# Patient Record
Sex: FEMALE | Race: WHITE | NOT HISPANIC OR LATINO | Employment: FULL TIME | URBAN - METROPOLITAN AREA
[De-identification: names, ages, dates, MRNs, and addresses within clinical notes are randomized per-mention and may not be internally consistent; named-entity substitution may affect disease eponyms.]

---

## 2017-01-21 ENCOUNTER — HOSPITAL ENCOUNTER (INPATIENT)
Facility: HOSPITAL | Age: 37
LOS: 2 days | Discharge: HOME | End: 2017-01-23
Attending: EMERGENCY MEDICINE | Admitting: FAMILY MEDICINE
Payer: MEDICAID

## 2017-01-21 DIAGNOSIS — M79.89 SWELLING OF RIGHT LOWER EXTREMITY: ICD-10-CM

## 2017-01-21 DIAGNOSIS — L03.115 CELLULITIS OF RIGHT LOWER EXTREMITY: Primary | ICD-10-CM

## 2017-01-21 LAB
ALBUMIN SERPL BCP-MCNC: 3.2 G/DL
ALP SERPL-CCNC: 57 U/L
ALT SERPL W/O P-5'-P-CCNC: 14 U/L
ANION GAP SERPL CALC-SCNC: 8 MMOL/L
AST SERPL-CCNC: 15 U/L
BASOPHILS # BLD AUTO: 0 K/UL
BASOPHILS NFR BLD: 0.6 %
BILIRUB SERPL-MCNC: 0.7 MG/DL
BUN SERPL-MCNC: 6 MG/DL
CALCIUM SERPL-MCNC: 8.2 MG/DL
CHLORIDE SERPL-SCNC: 108 MMOL/L
CO2 SERPL-SCNC: 21 MMOL/L
CREAT SERPL-MCNC: 0.7 MG/DL
CRP SERPL-MCNC: 98.1 MG/L
DIFFERENTIAL METHOD: ABNORMAL
EOSINOPHIL # BLD AUTO: 0.1 K/UL
EOSINOPHIL NFR BLD: 0.9 %
ERYTHROCYTE [DISTWIDTH] IN BLOOD BY AUTOMATED COUNT: 12 %
ERYTHROCYTE [SEDIMENTATION RATE] IN BLOOD BY WESTERGREN METHOD: 54 MM/HR
EST. GFR  (AFRICAN AMERICAN): >60 ML/MIN/1.73 M^2
EST. GFR  (NON AFRICAN AMERICAN): >60 ML/MIN/1.73 M^2
GLUCOSE SERPL-MCNC: 91 MG/DL
HCT VFR BLD AUTO: 31.4 %
HGB BLD-MCNC: 10.8 G/DL
INR PPP: 1
LACTATE SERPL-SCNC: 0.7 MMOL/L
LYMPHOCYTES # BLD AUTO: 1.4 K/UL
LYMPHOCYTES NFR BLD: 21 %
MCH RBC QN AUTO: 31.2 PG
MCHC RBC AUTO-ENTMCNC: 34.6 %
MCV RBC AUTO: 90 FL
MONOCYTES # BLD AUTO: 0.7 K/UL
MONOCYTES NFR BLD: 10.5 %
NEUTROPHILS # BLD AUTO: 4.4 K/UL
NEUTROPHILS NFR BLD: 67 %
PLATELET # BLD AUTO: 189 K/UL
PMV BLD AUTO: 8.2 FL
POTASSIUM SERPL-SCNC: 3.5 MMOL/L
PROT SERPL-MCNC: 6.5 G/DL
PROTHROMBIN TIME: 10.8 SEC
RBC # BLD AUTO: 3.48 M/UL
SODIUM SERPL-SCNC: 137 MMOL/L
WBC # BLD AUTO: 6.5 K/UL

## 2017-01-21 PROCEDURE — 85610 PROTHROMBIN TIME: CPT

## 2017-01-21 PROCEDURE — 83605 ASSAY OF LACTIC ACID: CPT

## 2017-01-21 PROCEDURE — 63600175 PHARM REV CODE 636 W HCPCS: Performed by: FAMILY MEDICINE

## 2017-01-21 PROCEDURE — 25000003 PHARM REV CODE 250: Performed by: EMERGENCY MEDICINE

## 2017-01-21 PROCEDURE — 87040 BLOOD CULTURE FOR BACTERIA: CPT | Mod: 59

## 2017-01-21 PROCEDURE — 12000002 HC ACUTE/MED SURGE SEMI-PRIVATE ROOM

## 2017-01-21 PROCEDURE — 25000003 PHARM REV CODE 250: Performed by: FAMILY MEDICINE

## 2017-01-21 PROCEDURE — 25000003 PHARM REV CODE 250: Performed by: PHYSICIAN ASSISTANT

## 2017-01-21 PROCEDURE — 85025 COMPLETE CBC W/AUTO DIFF WBC: CPT

## 2017-01-21 PROCEDURE — 63600175 PHARM REV CODE 636 W HCPCS: Performed by: PHYSICIAN ASSISTANT

## 2017-01-21 PROCEDURE — 85651 RBC SED RATE NONAUTOMATED: CPT

## 2017-01-21 PROCEDURE — 86140 C-REACTIVE PROTEIN: CPT

## 2017-01-21 PROCEDURE — 80053 COMPREHEN METABOLIC PANEL: CPT

## 2017-01-21 PROCEDURE — 36415 COLL VENOUS BLD VENIPUNCTURE: CPT

## 2017-01-21 PROCEDURE — 99284 EMERGENCY DEPT VISIT MOD MDM: CPT

## 2017-01-21 RX ORDER — METOCLOPRAMIDE HYDROCHLORIDE 5 MG/ML
5 INJECTION INTRAMUSCULAR; INTRAVENOUS EVERY 6 HOURS PRN
Status: DISCONTINUED | OUTPATIENT
Start: 2017-01-21 | End: 2017-01-23 | Stop reason: HOSPADM

## 2017-01-21 RX ORDER — RAMELTEON 8 MG/1
8 TABLET ORAL NIGHTLY PRN
Status: DISCONTINUED | OUTPATIENT
Start: 2017-01-21 | End: 2017-01-23 | Stop reason: HOSPADM

## 2017-01-21 RX ORDER — ACETAMINOPHEN 325 MG/1
650 TABLET ORAL EVERY 6 HOURS PRN
Status: DISCONTINUED | OUTPATIENT
Start: 2017-01-21 | End: 2017-01-23 | Stop reason: HOSPADM

## 2017-01-21 RX ORDER — PHENTERMINE HYDROCHLORIDE 37.5 MG/1
37.5 TABLET ORAL
COMMUNITY
End: 2019-09-30

## 2017-01-21 RX ORDER — KETOROLAC TROMETHAMINE 30 MG/ML
30 INJECTION, SOLUTION INTRAMUSCULAR; INTRAVENOUS EVERY 6 HOURS
Status: DISCONTINUED | OUTPATIENT
Start: 2017-01-21 | End: 2017-01-22

## 2017-01-21 RX ORDER — ONDANSETRON 4 MG/1
8 TABLET, ORALLY DISINTEGRATING ORAL EVERY 8 HOURS PRN
Status: DISCONTINUED | OUTPATIENT
Start: 2017-01-21 | End: 2017-01-21

## 2017-01-21 RX ORDER — ONDANSETRON 4 MG/1
4 TABLET, ORALLY DISINTEGRATING ORAL EVERY 6 HOURS PRN
Status: DISCONTINUED | OUTPATIENT
Start: 2017-01-21 | End: 2017-01-23 | Stop reason: HOSPADM

## 2017-01-21 RX ORDER — HYDROCODONE BITARTRATE AND ACETAMINOPHEN 5; 325 MG/1; MG/1
1 TABLET ORAL EVERY 6 HOURS PRN
Status: DISCONTINUED | OUTPATIENT
Start: 2017-01-21 | End: 2017-01-23 | Stop reason: HOSPADM

## 2017-01-21 RX ORDER — TRAMADOL HYDROCHLORIDE 50 MG/1
50 TABLET ORAL EVERY 6 HOURS PRN
Status: DISCONTINUED | OUTPATIENT
Start: 2017-01-21 | End: 2017-01-23 | Stop reason: HOSPADM

## 2017-01-21 RX ADMIN — VANCOMYCIN HYDROCHLORIDE 1250 MG: 1 INJECTION, POWDER, LYOPHILIZED, FOR SOLUTION INTRAVENOUS at 06:01

## 2017-01-21 RX ADMIN — RAMELTEON 8 MG: 8 TABLET, FILM COATED ORAL at 08:01

## 2017-01-21 RX ADMIN — Medication 4.5 G: at 08:01

## 2017-01-21 RX ADMIN — KETOROLAC TROMETHAMINE 30 MG: 30 INJECTION, SOLUTION INTRAMUSCULAR at 06:01

## 2017-01-21 RX ADMIN — ONDANSETRON 8 MG: 4 TABLET, ORALLY DISINTEGRATING ORAL at 07:01

## 2017-01-21 RX ADMIN — METOCLOPRAMIDE 5 MG: 5 INJECTION, SOLUTION INTRAMUSCULAR; INTRAVENOUS at 11:01

## 2017-01-21 RX ADMIN — KETOROLAC TROMETHAMINE 30 MG: 30 INJECTION, SOLUTION INTRAMUSCULAR at 11:01

## 2017-01-21 RX ADMIN — TRAMADOL HYDROCHLORIDE 50 MG: 50 TABLET, COATED ORAL at 08:01

## 2017-01-21 NOTE — IP AVS SNAPSHOT
[address] [place] [address]  Phone: [phone]           Patient Discharge Instructions     Our goal is to set you up for success. This packet includes information on your condition, medications, and your home care. It will help you to care for yourself so you don't get sicker and need to go back to the hospital.     Please ask your nurse if you have any questions.        There are many details to remember when preparing to leave the hospital. Here is what you will need to do:    1. Take your medicine. If you are prescribed medications, review your Medication List in the following pages. You may have new medications to  at the pharmacy and others that you'll need to stop taking. Review the instructions for how and when to take your medications. Talk with your doctor or nurses if you are unsure of what to do.     2. Go to your follow-up appointments. Specific follow-up information is listed in the following pages. Your may be contacted by a transition nurse or clinical provider about future appointments. Be sure we have all of the phone numbers to reach you, if needed. Please contact your provider's office if you are unable to make an appointment.     3. Watch for warning signs. Your doctor or nurse will give you detailed warning signs to watch for and when to call for assistance. These instructions may also include educational information about your condition. If you experience any of warning signs to your health, call your doctor.               [facility]  Unless otherwise directed by your provider, please contact [facility], our nurse care line that is available for 24/7 assistance.     [phone] (toll-free)    Registered nurses in the [facility] provide clinical advisement, health education, appointment booking, and other advisory services.                    ** Verify the list of medication(s) below is accurate and up to date.  Carry this with you in case of emergency. If your medications have changed, please notify your healthcare provider.             Medication List      START taking these medications        Additional Info                      butalbital-acetaminophen-caffeine -40 mg -40 mg per tablet   Commonly known as:  FIORICET, ESGIC   Quantity:  30 tablet   Refills:  0   Dose:  1 tablet    Instructions:  Take 1 tablet by mouth every 4 (four) hours as needed for Pain.     Begin Date    AM    Noon    PM    Bedtime       cephALEXin 250 MG capsule   Commonly known as:  KEFLEX   Quantity:  40 capsule   Refills:  0   Dose:  250 mg   Indications:  Skin & soft tissue    Last time this was given:  250 mg on 1/23/2017  6:31 AM   Instructions:  Take 1 capsule (250 mg total) by mouth every 6 (six) hours.     Begin Date    AM    Noon    PM    Bedtime         CONTINUE taking these medications        Additional Info                      ibuprofen 800 MG tablet   Commonly known as:  ADVIL,MOTRIN   Quantity:  20 tablet   Refills:  0   Dose:  800 mg    Instructions:  Take 1 tablet (800 mg total) by mouth 3 (three) times daily as needed.     Begin Date    AM    Noon    PM    Bedtime       phentermine 37.5 mg tablet   Commonly known as:  ADIPEX-P   Refills:  0   Dose:  37.5 mg    Instructions:  Take 37.5 mg by mouth before breakfast.     Begin Date    AM    Noon    PM    Bedtime         ASK your doctor about these medications        Additional Info                      ondansetron 4 MG tablet   Commonly known as:  ZOFRAN   Quantity:  12 tablet   Refills:  0   Dose:  4 mg    Instructions:  Take 1 tablet (4 mg total) by mouth every 8 (eight) hours as needed.     Begin Date    AM    Noon    PM    Bedtime       ondansetron 4 MG Tbdl   Commonly known as:  ZOFRAN-ODT   Quantity:  12 tablet   Refills:  0   Dose:  4 mg    Last time this was given:  4 mg on 1/23/2017  4:07 AM   Instructions:  Take 1 tablet (4 mg total) by mouth every 8 (eight)  hours as needed.     Begin Date    AM    Noon    PM    Bedtime       tramadol 50 mg tablet   Commonly known as:  ULTRAM   Quantity:  12 tablet   Refills:  0   Dose:  50 mg    Last time this was given:  50 mg on 1/23/2017  8:46 AM   Instructions:  Take 1 tablet (50 mg total) by mouth every 6 (six) hours as needed.     Begin Date    AM    Noon    PM    Bedtime            Where to Get Your Medications      These medications were sent to [id number] - [place], [place] - [address] [address] [facility] [address]  [address] [place] [address]     Phone:  [phone]     butalbital-acetaminophen-caffeine -40 mg -40 mg per tablet    cephALEXin 250 MG capsule                  Please bring to all follow up appointments:    1. A copy of your discharge instructions.  2. All medicines you are currently taking in their original bottles.  3. Identification and insurance card.    Please arrive 15 minutes ahead of scheduled appointment time.    Please call 24 hours in advance if you must reschedule your appointment and/or time.        Follow-up Information     Follow up with Charity Allen MD In 1 week.    Specialty:  Family Medicine    Why:  hospital follow up scheduled for 1-30-17 @ 9:45 a.m.    Contact information:    [address] [place] [address]  [phone]          Discharge Instructions     Future Orders    Activity as tolerated     Call MD for:  difficulty breathing or increased cough     Call MD for:  increased confusion or weakness     Call MD for:  worsening rash     Diet general     Questions:    Total calories:      Fat restriction, if any:      Protein restriction, if any:      Na restriction, if any:      Fluid restriction:      Additional restrictions:          Discharge Instructions       Thank you for choosing [facility] for your medical care. The primary doctor who is taking care of you at the time of your discharge is Melania Dejesus MD.  "    You were admitted to the hospital with Cellulitis of right lower extremity.     Please note your discharge instructions, including diet/activity restrictions, follow-up appointments, and medication changes.  If you have any questions about your medical issues, prescriptions, or any other questions, please feel free to contact the [facility] Medicine Dept at [phone] and we will help.    If you are previously with Home health, outpatient PT/OT or under a therapy program, you are cleared to return to those programs.    Please direct all long term medication refills and follow up to your primary care provider, Primary Doctor No. Thank you again for letting us take care of your health care needs.    Please note the following discharge instructions per your discharging physician-  Take all medications as prescribed. Follow up with your doctor in 1 week.         Primary Diagnosis     Your primary diagnosis was:  Bacterial Skin Infection Of Leg      Admission Information     Date & Time Provider Department CSN    1/21/2017  3:02 PM Melania Dejesus MD [facility] [id number]      Care Providers     Provider Role Specialty Primary office phone    Melania Dejesus MD Attending Provider Family Medicine [phone]      Your Vitals Were     BP Pulse Temp Resp Height Weight    136/71 (BP Location: Right arm, Patient Position: Sitting, BP Method: Automatic) 80 98.2 °F (36.8 °C) (Oral) 18 5' 4" (1.626 m) 145.2 kg (320 lb)    Last Period SpO2 BMI          01/15/2017 96% 54.93 kg/m2        Recent Lab Values     No lab values to display.      Pending Labs     Order Current Status    Blood culture #1 Preliminary result    Blood culture #2 Preliminary result      Allergies as of 1/23/2017     No Known Allergies      Advance Directives     An advance directive is a document which, in the event you are no longer able to make decisions for yourself, tells your healthcare team what kind of " treatment you do or do not want to receive, or who you would like to make those decisions for you.  If you do not currently have an advance directive, [facility] encourages you to create one.  For more information call:  [phone]), [phone] ([phone]),  or log on to [email or web address].        Smoking Cessation     If you would like to quit smoking:   You may be eligible for free services if you are a [place] resident and started smoking cigarettes before September 1, 1988.  Call the Smoking Cessation [employer] (SCT) toll free at (112) 358-5014 or (474) 024-4289.   Call 3-260-QUIT-NOW if you do not meet the above criteria.            Language Assistance Services     ATTENTION: Language assistance services are available, free of charge. Please call [phone].      ATENCIÓN: Si habla español, tiene a miller disposición servicios gratuitos de asistencia lingüística. Llame al [phone].     CHÚ Ý: N?u b?n nói Ti?ng Vi?t, có các d?ch v? h? tr? ngôn ng? mi?n phí dành cho b?n. G?i s? [phone].        [facility] Sign-Up     Activating your [facility] account is as easy as 1-2-3!     1) Visit [email or web address], select Sign Up Now, enter this activation code and your date of birth, then select Next.  [email or web address]  Expires: 3/9/2017  9:43 AM      2) Create a username and password to use when you visit [facility] in the future and select a security question in case you lose your password and select Next.    3) Enter your e-mail address and click Sign Up!    Additional Information  If you have questions, please e-mail [email or web address] or call [phone] to talk to our [facility] staff. Remember, [facility] is NOT to be used for urgent needs. For medical emergencies, dial 911.          [facility] complies with applicable Federal civil rights laws and does not discriminate on the basis of race, color, national origin, age, disability, or sex.

## 2017-01-21 NOTE — ED PROVIDER NOTES
"Encounter Date: 1/21/2017    SCRIBE #1 NOTE: I, Laure Ayers , am scribing for, and in the presence of,  Dr. Fraga . I have scribed the entire note.       History     Chief Complaint   Patient presents with    Leg Pain     Recently hospitalized at [facility] and D/C this morning with dx of cellulitis     Fever     Review of patient's allergies indicates:  No Known Allergies  HPI Comments:     01/21/2017  3:05 PM     Chief Complaint: Cellulitis       The patient is a 36 y.o. female with a PMHx of morbid obesity, renal disorder who is presenting with the gradual onset of cellulitis x 3 days noted to the RLE. The pt states that she currently feels "like crap" and that the skin around the area of infection feels "extremely tight" and tender. No exacerbating or mitigating factors. The pt confirms that she was DC from  this morning after x 2 days of IP treatment. Per pt, she was negative for DVT w/u but "still had a fever when they DC her this morning". The pt is unsure of what IV abx she was given while there. No pertinent pat surgical. Pt confirms social hx of smoking. No ETOH use.                 The history is provided by the patient and medical records.     Past Medical History   Diagnosis Date    Migraine headache     Morbid obesity     Renal disorder      kidney stone     No past medical history pertinent negatives.  Past Surgical History   Procedure Laterality Date    Cholecystectomy      Tubal ligation      Fracture surgery  1984     right lower leg reconstruction surgery s/p trauma injury     Family History   Problem Relation Age of Onset    Heart disease Mother      Social History   Substance Use Topics    Smoking status: Current Every Day Smoker    Smokeless tobacco: None    Alcohol use No     Review of Systems   Constitutional: Positive for fever.        "like crap"   HENT: Negative for sore throat.    Eyes: Negative for visual disturbance.   Respiratory: Negative for shortness " of breath.    Cardiovascular: Negative for chest pain.   Gastrointestinal: Negative for nausea.   Genitourinary: Negative for dysuria.   Musculoskeletal: Negative for back pain.   Skin: Positive for color change (erythema) and wound. Negative for rash.   Neurological: Negative for weakness.   Hematological: Does not bruise/bleed easily.   Psychiatric/Behavioral: Negative for confusion.       Physical Exam   Initial Vitals   BP Pulse Resp Temp SpO2   01/21/17 1457 01/21/17 1457 01/21/17 1457 01/21/17 1457 01/21/17 1457   124/62 85 18 98.3 °F (36.8 °C) 96 %     Physical Exam    Nursing note and vitals reviewed.  Constitutional: She appears well-developed and well-nourished.   Malaise    HENT:   Head: Normocephalic and atraumatic.   Mouth/Throat: Oropharynx is clear and moist.   Eyes: EOM are normal. Pupils are equal, round, and reactive to light.   Neck: Normal range of motion. Neck supple.   Cardiovascular: Normal rate, regular rhythm, normal heart sounds and intact distal pulses. Exam reveals no gallop and no friction rub.    No murmur heard.  No palpable cord. Negative Honan's sign. Brisk capillary refill. 1+ DP palpable pulse.            Pulmonary/Chest: Breath sounds normal. She has no wheezes. She has no rhonchi. She has no rales.   Abdominal: Soft. Bowel sounds are normal. She exhibits no distension. There is no tenderness.   Musculoskeletal: Normal range of motion. She exhibits no edema or tenderness.   Lymphadenopathy:     She has no cervical adenopathy.   Neurological: She is alert and oriented to person, place, and time. She has normal strength. No sensory deficit.   Skin: Skin is warm. There is erythema.   There is mild warmth noted to the anterior aspect of the R leg with asscoiated erythema and swelling. No fluctuance or induration noted.    Psychiatric: She has a normal mood and affect.         ED Course   Procedures  Labs Reviewed   CBC W/ AUTO DIFFERENTIAL - Abnormal; Notable for the following:         Result Value    RBC 3.48 (*)     Hemoglobin 10.8 (*)     Hematocrit 31.4 (*)     MCH 31.2 (*)     MPV 8.2 (*)     All other components within normal limits   COMPREHENSIVE METABOLIC PANEL - Abnormal; Notable for the following:     CO2 21 (*)     Calcium 8.2 (*)     Albumin 3.2 (*)     All other components within normal limits   SEDIMENTATION RATE, MANUAL - Abnormal; Notable for the following:     Sed Rate 54 (*)     All other components within normal limits   C-REACTIVE PROTEIN - Abnormal; Notable for the following:     CRP 98.1 (*)     All other components within normal limits   LACTIC ACID, PLASMA   PROTIME-INR          X-Rays:   Independently Interpreted Readings:   Other Readings:  Right tib-fib x-ray: I interpreted this x-ray myself.  There is no evidence of subcutaneous gas or foreign body.  There is also no evidence of osteomyelitis.    Medical Decision Making:   History:   Old Records Summarized: records from another hospital.       <> Summary of Records: Reviewed DC paperwork and the pt received Clindamycin for OP treatment.   Initial Assessment:         Emergent evaluation.     Ddx includes bacteremia, sepsis, cellulitis, abscess     PE is not consistent with abscess. Since the pt has failed PO clindamycin I will provide IV Vancomycin and Zosyn. I will discuss admission with hospitalist .               Scribe Attestation:   Scribe #1: I performed the above scribed service and the documentation accurately describes the services I performed. I attest to the accuracy of the note.    Attending Attestation:           Physician Attestation for Scribe:  Physician Attestation Statement for Scribe #1: I, Dr. Fraga, reviewed documentation, as scribed by Laure Ayers  in my presence, and it is both accurate and complete.         Attending ED Notes:   5:06 PM  The patient's laboratory studies are significant for elevated inflammatory markers.  Otherwise, there are no clinically significant abnormalities.  I will  discuss admission with hospital medicine.    5:09 PM  I discussed this case with the hospitalist, Dr. Dejesus.  She has agreed to admit this patient.          ED Course     Clinical Impression:   The primary encounter diagnosis was Cellulitis of right lower extremity. A diagnosis of Swelling of right lower extremity was also pertinent to this visit.    Disposition:   Disposition: Admitted       Raul Fraga MD  02/08/17 9307

## 2017-01-21 NOTE — PROGRESS NOTES
5:53 PM  Patient arrived to ED with transporter. Patient c/o severe pain, requiring pain medication. Admit order shows Dr Dejesus as attending. Called Dr Dejesus cell phone at this time- no answer- voice msg left. Awaiting call back at present.     Received order for IV toradol 30 mg Q6 scheduled for 5 days- only able to order for 3 days maximum per system default.

## 2017-01-22 PROBLEM — R11.2 NON-INTRACTABLE VOMITING WITH NAUSEA: Status: RESOLVED | Noted: 2017-01-22 | Resolved: 2017-01-22

## 2017-01-22 PROBLEM — R11.2 NON-INTRACTABLE VOMITING WITH NAUSEA: Status: ACTIVE | Noted: 2017-01-22

## 2017-01-22 PROBLEM — E66.01 OBESITY, CLASS III, BMI 40-49.9 (MORBID OBESITY): Status: ACTIVE | Noted: 2017-01-22

## 2017-01-22 LAB
ALBUMIN SERPL BCP-MCNC: 2.9 G/DL
ALP SERPL-CCNC: 52 U/L
ALT SERPL W/O P-5'-P-CCNC: 15 U/L
ANION GAP SERPL CALC-SCNC: 9 MMOL/L
AST SERPL-CCNC: 15 U/L
BASOPHILS # BLD AUTO: 0 K/UL
BASOPHILS NFR BLD: 0.5 %
BILIRUB SERPL-MCNC: 0.6 MG/DL
BUN SERPL-MCNC: 6 MG/DL
CALCIUM SERPL-MCNC: 8.1 MG/DL
CHLORIDE SERPL-SCNC: 107 MMOL/L
CO2 SERPL-SCNC: 21 MMOL/L
CREAT SERPL-MCNC: 0.7 MG/DL
DIFFERENTIAL METHOD: ABNORMAL
EOSINOPHIL # BLD AUTO: 0.2 K/UL
EOSINOPHIL NFR BLD: 3.5 %
ERYTHROCYTE [DISTWIDTH] IN BLOOD BY AUTOMATED COUNT: 11.8 %
EST. GFR  (AFRICAN AMERICAN): >60 ML/MIN/1.73 M^2
EST. GFR  (NON AFRICAN AMERICAN): >60 ML/MIN/1.73 M^2
GLUCOSE SERPL-MCNC: 106 MG/DL
HCT VFR BLD AUTO: 30.2 %
HGB BLD-MCNC: 10.4 G/DL
LACTATE SERPL-SCNC: 0.7 MMOL/L
LYMPHOCYTES # BLD AUTO: 1.5 K/UL
LYMPHOCYTES NFR BLD: 28.7 %
MAGNESIUM SERPL-MCNC: 2.1 MG/DL
MCH RBC QN AUTO: 31.1 PG
MCHC RBC AUTO-ENTMCNC: 34.3 %
MCV RBC AUTO: 91 FL
MONOCYTES # BLD AUTO: 0.6 K/UL
MONOCYTES NFR BLD: 12.1 %
NEUTROPHILS # BLD AUTO: 3 K/UL
NEUTROPHILS NFR BLD: 55.2 %
PLATELET # BLD AUTO: 149 K/UL
PMV BLD AUTO: 8.2 FL
POTASSIUM SERPL-SCNC: 3.8 MMOL/L
PROT SERPL-MCNC: 5.8 G/DL
RBC # BLD AUTO: 3.34 M/UL
SODIUM SERPL-SCNC: 137 MMOL/L
VANCOMYCIN TROUGH SERPL-MCNC: 15.8 UG/ML
WBC # BLD AUTO: 5.4 K/UL

## 2017-01-22 PROCEDURE — 25000003 PHARM REV CODE 250: Performed by: PHYSICIAN ASSISTANT

## 2017-01-22 PROCEDURE — 12000002 HC ACUTE/MED SURGE SEMI-PRIVATE ROOM

## 2017-01-22 PROCEDURE — 63600175 PHARM REV CODE 636 W HCPCS: Performed by: FAMILY MEDICINE

## 2017-01-22 PROCEDURE — 25000003 PHARM REV CODE 250: Performed by: FAMILY MEDICINE

## 2017-01-22 PROCEDURE — 83605 ASSAY OF LACTIC ACID: CPT

## 2017-01-22 PROCEDURE — 25000003 PHARM REV CODE 250: Performed by: EMERGENCY MEDICINE

## 2017-01-22 PROCEDURE — 83735 ASSAY OF MAGNESIUM: CPT

## 2017-01-22 PROCEDURE — 80053 COMPREHEN METABOLIC PANEL: CPT

## 2017-01-22 PROCEDURE — 36415 COLL VENOUS BLD VENIPUNCTURE: CPT

## 2017-01-22 PROCEDURE — 63600175 PHARM REV CODE 636 W HCPCS: Performed by: PHYSICIAN ASSISTANT

## 2017-01-22 PROCEDURE — 80202 ASSAY OF VANCOMYCIN: CPT

## 2017-01-22 PROCEDURE — 85025 COMPLETE CBC W/AUTO DIFF WBC: CPT

## 2017-01-22 PROCEDURE — C9113 INJ PANTOPRAZOLE SODIUM, VIA: HCPCS | Performed by: PHYSICIAN ASSISTANT

## 2017-01-22 RX ORDER — KETOROLAC TROMETHAMINE 30 MG/ML
30 INJECTION, SOLUTION INTRAMUSCULAR; INTRAVENOUS ONCE AS NEEDED
Status: COMPLETED | OUTPATIENT
Start: 2017-01-22 | End: 2017-01-22

## 2017-01-22 RX ORDER — PANTOPRAZOLE SODIUM 40 MG/1
40 TABLET, DELAYED RELEASE ORAL DAILY
Status: DISCONTINUED | OUTPATIENT
Start: 2017-01-22 | End: 2017-01-23 | Stop reason: HOSPADM

## 2017-01-22 RX ORDER — ACETAMINOPHEN 10 MG/ML
1000 INJECTION, SOLUTION INTRAVENOUS ONCE
Status: COMPLETED | OUTPATIENT
Start: 2017-01-22 | End: 2017-01-22

## 2017-01-22 RX ORDER — SODIUM CHLORIDE 9 MG/ML
1000 INJECTION, SOLUTION INTRAVENOUS CONTINUOUS
Status: DISCONTINUED | OUTPATIENT
Start: 2017-01-22 | End: 2017-01-23 | Stop reason: HOSPADM

## 2017-01-22 RX ORDER — CEPHALEXIN 250 MG/1
250 CAPSULE ORAL EVERY 6 HOURS
Status: DISCONTINUED | OUTPATIENT
Start: 2017-01-22 | End: 2017-01-23 | Stop reason: HOSPADM

## 2017-01-22 RX ORDER — PANTOPRAZOLE SODIUM 40 MG/10ML
40 INJECTION, POWDER, LYOPHILIZED, FOR SOLUTION INTRAVENOUS ONCE
Status: COMPLETED | OUTPATIENT
Start: 2017-01-22 | End: 2017-01-22

## 2017-01-22 RX ADMIN — CEPHALEXIN 250 MG: 250 CAPSULE ORAL at 06:01

## 2017-01-22 RX ADMIN — VANCOMYCIN HYDROCHLORIDE 1250 MG: 1 INJECTION, POWDER, LYOPHILIZED, FOR SOLUTION INTRAVENOUS at 06:01

## 2017-01-22 RX ADMIN — Medication 4.5 G: at 05:01

## 2017-01-22 RX ADMIN — SODIUM CHLORIDE 1000 ML: 0.9 INJECTION, SOLUTION INTRAVENOUS at 09:01

## 2017-01-22 RX ADMIN — PROMETHAZINE HYDROCHLORIDE 12.5 MG: 25 INJECTION INTRAMUSCULAR; INTRAVENOUS at 12:01

## 2017-01-22 RX ADMIN — Medication 4.5 G: at 12:01

## 2017-01-22 RX ADMIN — SODIUM CHLORIDE 1000 ML: 0.9 INJECTION, SOLUTION INTRAVENOUS at 02:01

## 2017-01-22 RX ADMIN — PANTOPRAZOLE SODIUM 40 MG: 40 TABLET, DELAYED RELEASE ORAL at 08:01

## 2017-01-22 RX ADMIN — KETOROLAC TROMETHAMINE 30 MG: 30 INJECTION, SOLUTION INTRAMUSCULAR at 03:01

## 2017-01-22 RX ADMIN — PANTOPRAZOLE SODIUM 40 MG: 40 INJECTION, POWDER, FOR SOLUTION INTRAVENOUS at 12:01

## 2017-01-22 RX ADMIN — HYDROCODONE BITARTRATE AND ACETAMINOPHEN 1 TABLET: 5; 325 TABLET ORAL at 07:01

## 2017-01-22 RX ADMIN — CEPHALEXIN 250 MG: 250 CAPSULE ORAL at 11:01

## 2017-01-22 RX ADMIN — ACETAMINOPHEN 1000 MG: 10 INJECTION, SOLUTION INTRAVENOUS at 12:01

## 2017-01-22 RX ADMIN — VANCOMYCIN HYDROCHLORIDE 1250 MG: 1 INJECTION, POWDER, LYOPHILIZED, FOR SOLUTION INTRAVENOUS at 09:01

## 2017-01-22 RX ADMIN — VANCOMYCIN HYDROCHLORIDE 1250 MG: 1 INJECTION, POWDER, LYOPHILIZED, FOR SOLUTION INTRAVENOUS at 02:01

## 2017-01-22 NOTE — PROGRESS NOTES
"Patient states "I'm feeling better and ready to go home". This nurse spoke with Dr. Dejesus and MD states she should be rounding within the hour. Patient updated.   "

## 2017-01-22 NOTE — H&P
[facility] Hospital Medicine  History & Physical    Patient Name: Roya Booker  MRN: [id number]  Admission Date: 1/21/2017  Attending Physician: Melania Dejesus MD   Primary Care Provider: Primary Doctor No         Patient information was obtained from patient and ER records.     Subjective:     Principal Problem:Cellulitis of right lower extremity    Chief Complaint:  RIGHT lower extremity pain     HPI: Miss Booker presents for evaluation of RIGHT lower extremity redness, pain which began 3 days ago. She presented to another facility and was admitted for cellulitis and was administered IV antibiotics (Clindamycin). She was admitted for about one day and was discharged with oral antibiotics (Clindamycin) which she did not fill. She reports her symptoms never improved. She reports experiencing fever, with increasing temperature (up to 103 F). She reports feeling dizzy and weak. She denies trauma. She denies insect bite. She denies history of skin infection. She denies allergy. She reports feeling nauseous and vomiting meals since Thursday. She denies history of DVT or PE. A venous US was performed at prior facility and DVT was ruled out.    Past Medical History   Diagnosis Date    Migraine headache     Morbid obesity     Renal disorder      kidney stone       Past Surgical History   Procedure Laterality Date    Cholecystectomy      Tubal ligation      Fracture surgery  1984     right lower leg reconstruction surgery s/p trauma injury       Review of patient's allergies indicates:  No Known Allergies    No current facility-administered medications on file prior to encounter.      Current Outpatient Prescriptions on File Prior to Encounter   Medication Sig    ibuprofen (ADVIL,MOTRIN) 800 MG tablet Take 1 tablet (800 mg total) by mouth 3 (three) times daily as needed.     Family History     Problem Relation (Age of Onset)    Heart disease Mother        Social History Main Topics     Smoking status: Current Every Day Smoker    Smokeless tobacco: Not on file    Alcohol use No    Drug use: Not on file    Sexual activity: Not on file     Review of Systems   Constitutional: Positive for chills and fever.   HENT: Negative for congestion and sore throat.    Eyes: Negative for photophobia and visual disturbance.   Respiratory: Negative for cough and shortness of breath.    Cardiovascular: Negative for chest pain and palpitations.   Gastrointestinal: Positive for nausea and vomiting. Negative for abdominal pain and diarrhea.   Genitourinary: Negative for difficulty urinating and dysuria.   Musculoskeletal: Negative for neck pain and neck stiffness.   Skin: Negative for rash and wound.   Neurological: Positive for dizziness. Negative for syncope.   Psychiatric/Behavioral: Negative for dysphoric mood. The patient is not nervous/anxious.      Objective:     Vital Signs (Most Recent):  Temp: 97.8 °F (36.6 °C) (01/21/17 2335)  Pulse: 84 (01/21/17 2335)  Resp: 18 (01/21/17 2335)  BP: (!) 102/55 (01/21/17 2335)  SpO2: 95 % (01/21/17 2335) Vital Signs (24h Range):  Temp:  [97.8 °F (36.6 °C)-98.3 °F (36.8 °C)] 97.8 °F (36.6 °C)  Pulse:  [74-85] 84  Resp:  [18] 18  SpO2:  [95 %-97 %] 95 %  BP: (102-124)/(55-82) 102/55     Weight: (!) 145.2 kg (320 lb)  Body mass index is 54.93 kg/(m^2).    Physical Exam   Constitutional: She is oriented to person, place, and time. She appears well-developed and well-nourished.   HENT:   Head: Normocephalic and atraumatic.   Eyes: Right eye exhibits no discharge. Left eye exhibits no discharge. No scleral icterus.   Neck: Neck supple. No JVD present.   Cardiovascular: Normal rate and regular rhythm.    Pulmonary/Chest: Effort normal and breath sounds normal. No respiratory distress.   Abdominal: Soft. Bowel sounds are normal. She exhibits no distension. There is no tenderness.   Musculoskeletal: She exhibits tenderness. She exhibits no edema.   Neurological: She is alert  and oriented to person, place, and time.   Skin: Skin is warm. She is not diaphoretic. There is erythema.   RIGHT lower extremity is mildy erythematous. There is no wound other than healed medial and lateral surgical wound (from ORIF when she was 4 years old). Pulses are equal.   Psychiatric: She has a normal mood and affect. Her behavior is normal.   Nursing note and vitals reviewed.       Significant Labs:   CBC:   Recent Labs  Lab 01/21/17  1529   WBC 6.50   HGB 10.8*   HCT 31.4*        CMP:   Recent Labs  Lab 01/21/17  1529      K 3.5      CO2 21*   GLU 91   BUN 6   CREATININE 0.7   CALCIUM 8.2*   PROT 6.5   ALBUMIN 3.2*   BILITOT 0.7   ALKPHOS 57   AST 15   ALT 14   ANIONGAP 8   EGFRNONAA >60     Lab Results   Component Value Date    CRP 98.1 (H) 01/21/2017     Lactic acid: 0.7    Lab Results   Component Value Date    INR 1.0 01/21/2017         Significant Imaging:                 X-Ray Tibia Fibula 2 View Right [[id number]] Resulted: 01/21/17 1615       Order Status: Completed Updated: 01/21/17 1615       Narrative:         Technique: AP and lateral radiographs of the tibia and fibula were acquired.    Comparison: None    Findings:    There is cortical thickening/periostitis present at the junction of the mid and distal one 3rd of the posterior medial right tibial shaft suggesting stress change/shin splints.  No definite stress fracture appreciated.  No overlying soft tissue mass.       Impression:             cortical thickening affecting the junction of the mid and distal 1/3rd of the tibial shaft which statistically most likely relates to stress change/shin splints.  Please correlate clinically for an appropriate clinical history area            Assessment/Plan:     * Cellulitis of right lower extremity  IV antibiotics (Zosyn)  Trend WBC, temperature  Follow up blood culture  Serial exams      Non-intractable vomiting with nausea  PRN antiemetics      Obesity, Class III, BMI 40-49.9  (morbid obesity)  Encourage weight-loss with diet and exercise      VTE Risk Mitigation         Ordered     Medium Risk of VTE  Once      01/21/17 1752     Place sequential compression device  Until discontinued      01/21/17 1752     Place ADELIA hose  Until discontinued      01/21/17 1752        Tessa Stack PA-C  Department of Hospital Medicine   [facility]

## 2017-01-22 NOTE — PROGRESS NOTES
Patient's daughter left contact information for our records and states that the patient's emergency contact needs to be updated on facesheet. This nurse spoke with registration department and updated emergency contact to pt's spouse and phone number. Pt's sister to  patient at discharge.

## 2017-01-22 NOTE — SUBJECTIVE & OBJECTIVE
Past Medical History   Diagnosis Date    Migraine headache     Morbid obesity     Renal disorder      kidney stone       Past Surgical History   Procedure Laterality Date    Cholecystectomy      Tubal ligation      Fracture surgery  1984     right lower leg reconstruction surgery s/p trauma injury       Review of patient's allergies indicates:  No Known Allergies    No current facility-administered medications on file prior to encounter.      Current Outpatient Prescriptions on File Prior to Encounter   Medication Sig    ibuprofen (ADVIL,MOTRIN) 800 MG tablet Take 1 tablet (800 mg total) by mouth 3 (three) times daily as needed.     Family History     Problem Relation (Age of Onset)    Heart disease Mother        Social History Main Topics    Smoking status: Current Every Day Smoker    Smokeless tobacco: Not on file    Alcohol use No    Drug use: Not on file    Sexual activity: Not on file     Review of Systems   Constitutional: Positive for chills and fever.   HENT: Negative for congestion and sore throat.    Eyes: Negative for photophobia and visual disturbance.   Respiratory: Negative for cough and shortness of breath.    Cardiovascular: Negative for chest pain and palpitations.   Gastrointestinal: Positive for nausea and vomiting. Negative for abdominal pain and diarrhea.   Genitourinary: Negative for difficulty urinating and dysuria.   Musculoskeletal: Negative for neck pain and neck stiffness.   Skin: Negative for rash and wound.   Neurological: Positive for dizziness. Negative for syncope.   Psychiatric/Behavioral: Negative for dysphoric mood. The patient is not nervous/anxious.      Objective:     Vital Signs (Most Recent):  Temp: 97.8 °F (36.6 °C) (01/21/17 2335)  Pulse: 84 (01/21/17 2335)  Resp: 18 (01/21/17 2335)  BP: (!) 102/55 (01/21/17 2335)  SpO2: 95 % (01/21/17 2335) Vital Signs (24h Range):  Temp:  [97.8 °F (36.6 °C)-98.3 °F (36.8 °C)] 97.8 °F (36.6 °C)  Pulse:  [74-85] 84  Resp:  [18]  18  SpO2:  [95 %-97 %] 95 %  BP: (102-124)/(55-82) 102/55     Weight: (!) 145.2 kg (320 lb)  Body mass index is 54.93 kg/(m^2).    Physical Exam   Constitutional: She is oriented to person, place, and time. She appears well-developed and well-nourished.   HENT:   Head: Normocephalic and atraumatic.   Eyes: Right eye exhibits no discharge. Left eye exhibits no discharge. No scleral icterus.   Neck: Neck supple. No JVD present.   Cardiovascular: Normal rate and regular rhythm.    Pulmonary/Chest: Effort normal and breath sounds normal. No respiratory distress.   Abdominal: Soft. Bowel sounds are normal. She exhibits no distension. There is no tenderness.   Musculoskeletal: She exhibits tenderness. She exhibits no edema.   Neurological: She is alert and oriented to person, place, and time.   Skin: Skin is warm. She is not diaphoretic. There is erythema.   RIGHT lower extremity is mildy erythematous. There is no wound other than healed medial and lateral surgical wound (from ORIF when she was 4 years old). Pulses are equal.   Psychiatric: She has a normal mood and affect. Her behavior is normal.   Nursing note and vitals reviewed.       Significant Labs:   CBC:   Recent Labs  Lab 01/21/17  1529   WBC 6.50   HGB 10.8*   HCT 31.4*        CMP:   Recent Labs  Lab 01/21/17  1529      K 3.5      CO2 21*   GLU 91   BUN 6   CREATININE 0.7   CALCIUM 8.2*   PROT 6.5   ALBUMIN 3.2*   BILITOT 0.7   ALKPHOS 57   AST 15   ALT 14   ANIONGAP 8   EGFRNONAA >60     Lab Results   Component Value Date    CRP 98.1 (H) 01/21/2017     Lactic acid: 0.7    Lab Results   Component Value Date    INR 1.0 01/21/2017         Significant Imaging:                 X-Ray Tibia Fibula 2 View Right [[id number]] Resulted: 01/21/17 1615       Order Status: Completed Updated: 01/21/17 1615       Narrative:         Technique: AP and lateral radiographs of the tibia and fibula were acquired.    Comparison: None    Findings:    There is  cortical thickening/periostitis present at the junction of the mid and distal one 3rd of the posterior medial right tibial shaft suggesting stress change/shin splints.  No definite stress fracture appreciated.  No overlying soft tissue mass.       Impression:             cortical thickening affecting the junction of the mid and distal 1/3rd of the tibial shaft which statistically most likely relates to stress change/shin splints.  Please correlate clinically for an appropriate clinical history area

## 2017-01-22 NOTE — PLAN OF CARE
CM met with patient and daughter @ bedside. Patient reports that Alexander is her spouse [phone]. Insurance and demographic information verified, PCP is Dr. Menezes, pharmacy is [facility] on [address] in [place]. She is independent at home, drives and does not use any equipment. Patient does not have any d/c needs at this time. Message left for admitting to correct facesheet with current spousal information.      01/22/17 1310   Discharge Assessment   Assessment Type Discharge Planning Assessment   Confirmed/corrected address and phone number on facesheet? Yes   Assessment information obtained from? Patient   Type of Healthcare Directive Received Durable power of  for health care  (spouse- Sai Booker [phone])   Prior to hospitilization cognitive status: Alert/Oriented   Prior to hospitalization functional status: Independent   Current cognitive status: Alert/Oriented   Current Functional Status: Independent   Arrived From home or self-care   Lives With spouse   Able to Return to Prior Arrangements yes   Is patient able to care for self after discharge? Yes   How many people do you have in your home that can help with your care after discharge? 2   Who are your caregiver(s) and their phone number(s)? Sai Booker [phone] spouse   Patient's perception of discharge disposition home or selfcare   Readmission Within The Last 30 Days no previous admission in last 30 days   Patient currently being followed by outpatient case management? No   Patient currently receives home health services? No   Does the patient currently use HME? No   Patient currently receives private duty nursing? No   Patient currently receives any other outside agency services? No   Equipment Currently Used at Home none   Do you have any problems affording any of your prescribed medications? No   Is the patient taking medications as prescribed? yes   Do you have any financial concerns preventing you from receiving the  healthcare you need? No   Does the patient have transportation to healthcare appointments? Yes   Transportation Available car   On Dialysis? No   Does the patient receive services at the Coumadin Clinic? No   Are there any open cases? No   Discharge Plan A Home   Patient/Family In Agreement With Plan yes

## 2017-01-22 NOTE — CONSULTS
Roya Booker [id number] is a 36 y.o. female who has been consulted for vancomycin dosing.    The patient has the following labs:     Date Creatinine (mg/dl)    BUN WBC Count   1/21/2017 Estimated Creatinine Clearance: 159.4 mL/min (based on Cr of 0.7). Lab Results   Component Value Date    BUN 6 01/21/2017     Lab Results   Component Value Date    WBC 6.50 01/21/2017        Current weight is (!) 145.2 kg (320 lb)    The patient will be started on vancomycin at a dose of 1250 mg every 8 hours.  The vancomycin trough has been ordered for 01/22 at 1930.      Patient will be followed by pharmacy for changes in renal function, toxicity, and efficacy.   Thank you for allowing us to participate in this patient's care.     Esther Tamez

## 2017-01-22 NOTE — NURSING
HADLEY Terry notified that patient is complaining of nausea unresolved by Reglan or Zofran that has already been administered and also complaining of a headache unresolved by pain medications that have been given.  New orders written for phenergan IVPB, protonix IVP and tylenol IVP. Will monitor patient for relief of symptoms.

## 2017-01-23 VITALS
WEIGHT: 293 LBS | OXYGEN SATURATION: 96 % | BODY MASS INDEX: 50.02 KG/M2 | DIASTOLIC BLOOD PRESSURE: 71 MMHG | RESPIRATION RATE: 18 BRPM | HEART RATE: 80 BPM | SYSTOLIC BLOOD PRESSURE: 136 MMHG | TEMPERATURE: 98 F | HEIGHT: 64 IN

## 2017-01-23 PROCEDURE — 25000003 PHARM REV CODE 250: Performed by: FAMILY MEDICINE

## 2017-01-23 PROCEDURE — 25000003 PHARM REV CODE 250: Performed by: PHYSICIAN ASSISTANT

## 2017-01-23 PROCEDURE — 63600175 PHARM REV CODE 636 W HCPCS: Performed by: FAMILY MEDICINE

## 2017-01-23 RX ORDER — BUTALBITAL, ACETAMINOPHEN AND CAFFEINE 50; 325; 40 MG/1; MG/1; MG/1
1 TABLET ORAL EVERY 4 HOURS PRN
Qty: 30 TABLET | Refills: 0 | Status: SHIPPED | OUTPATIENT
Start: 2017-01-23 | End: 2017-02-22

## 2017-01-23 RX ORDER — CEPHALEXIN 250 MG/1
250 CAPSULE ORAL EVERY 6 HOURS
Qty: 40 CAPSULE | Refills: 0 | Status: SHIPPED | OUTPATIENT
Start: 2017-01-23 | End: 2017-02-02

## 2017-01-23 RX ADMIN — TRAMADOL HYDROCHLORIDE 50 MG: 50 TABLET, COATED ORAL at 08:01

## 2017-01-23 RX ADMIN — VANCOMYCIN HYDROCHLORIDE 1000 MG: 1 INJECTION, POWDER, LYOPHILIZED, FOR SOLUTION INTRAVENOUS at 02:01

## 2017-01-23 RX ADMIN — ONDANSETRON 4 MG: 4 TABLET, ORALLY DISINTEGRATING ORAL at 04:01

## 2017-01-23 RX ADMIN — CEPHALEXIN 250 MG: 250 CAPSULE ORAL at 06:01

## 2017-01-23 RX ADMIN — TRAMADOL HYDROCHLORIDE 50 MG: 50 TABLET, COATED ORAL at 02:01

## 2017-01-23 RX ADMIN — PANTOPRAZOLE SODIUM 40 MG: 40 TABLET, DELAYED RELEASE ORAL at 08:01

## 2017-01-23 NOTE — PLAN OF CARE
Problem: Patient Care Overview  Goal: Plan of Care Review  Outcome: Ongoing (interventions implemented as appropriate)  Pt resting quietly in bed, NAD noted, RR even and unlabored, able to voice needs, pain managed w/PRN medication at this time, IFV infusing w/o difficulty to left index finger, 22 ga, pt able to position self in bed and ambulate w/o assistance to the BR, pt has vomited once this shift and complained of HA vs LLE pain, LLE has improved significantly, skin is not as red, but remains warm and edematous, pt remains free from injury or falls, left resting in low bed, wheels locked , side rails up x 2, call bell near, will monitor.

## 2017-01-23 NOTE — PLAN OF CARE
01/23/17 0954   Final Note   Assessment Type Final Discharge Note   Discharge Disposition Home   Discharge planning education complete? Yes

## 2017-01-23 NOTE — CONSULTS
Roya Booker [id number] is a 36 y.o. female who has been consulted for vancomycin dosing.    Pharmacy consult for vancomycin dosing in no longer required.  Vancomycin was discontinued.    Thank you for allowing us to participate in this patient's care.   Emilee Ramirez, Pharm.D.

## 2017-01-23 NOTE — SUBJECTIVE & OBJECTIVE
Interval History: patient says her problem has resolved and wants to go home. The redness and pain she had on the R leg is gone.     Review of Systems   Constitutional: Negative for chills and fever.   Respiratory: Negative for shortness of breath.    Cardiovascular: Negative for chest pain.   Gastrointestinal: Negative for abdominal pain.   Genitourinary: Negative for dysuria.   Neurological: Positive for headaches.     Objective:     Vital Signs (Most Recent):  Temp: 98.2 °F (36.8 °C) (01/22/17 1906)  Pulse: 72 (01/22/17 1906)  Resp: 18 (01/22/17 1906)  BP: 130/78 (01/22/17 1906)  SpO2: 96 % (01/22/17 1906) Vital Signs (24h Range):  Temp:  [97.8 °F (36.6 °C)-98.2 °F (36.8 °C)] 98.2 °F (36.8 °C)  Pulse:  [63-84] 72  Resp:  [17-18] 18  SpO2:  [95 %-96 %] 96 %  BP: (102-130)/(55-78) 130/78     Weight: (!) 145.2 kg (320 lb)  Body mass index is 54.93 kg/(m^2).    Intake/Output Summary (Last 24 hours) at 01/22/17 2117  Last data filed at 01/22/17 1812   Gross per 24 hour   Intake          1523.33 ml   Output                0 ml   Net          1523.33 ml      Physical Exam   Constitutional: She appears well-developed and well-nourished. No distress.   HENT:   Head: Normocephalic and atraumatic.   Eyes: Conjunctivae and EOM are normal. Pupils are equal, round, and reactive to light.   Neck: Neck supple. No JVD present.   Cardiovascular: Normal rate and regular rhythm.    Murmur heard.  Pulmonary/Chest: Effort normal and breath sounds normal. No respiratory distress.   Abdominal: Soft. Bowel sounds are normal.   Musculoskeletal: She exhibits no edema.   Neurological: She is alert.   Skin: No erythema.   Psychiatric: She has a normal mood and affect. Her behavior is normal.   Vitals reviewed.      Significant Labs: All pertinent labs within the past 24 hours have been reviewed.    Significant Imaging: I have reviewed all pertinent imaging results/findings within the past 24 hours.

## 2017-01-23 NOTE — PROGRESS NOTES
[facility] Hospital Medicine  Progress Note    Patient Name: Roya Booker  MRN: [id number]  Patient Class: IP- Inpatient   Admission Date: 1/21/2017  Length of Stay: 1 days  Attending Physician: Melania Dejesus MD  Primary Care Provider: Primary Doctor No        Subjective:     Principal Problem:Cellulitis of right lower extremity    HPI:  Miss Booker presents for evaluation of RIGHT lower extremity redness, pain which began 3 days ago. She presented to another facility and was admitted for cellulitis and was administered IV antibiotics (Clindamycin). She was admitted for about one day and was discharged with oral antibiotics (Clindamycin) which she did not fill. She reports her symptoms never improved. She reports experiencing fever, with increasing temperature (up to 103 F). She reports feeling dizzy and weak. She denies trauma. She denies insect bite. She denies history of skin infection. She denies allergy. She reports feeling nauseous and vomiting meals since Thursday. She denies history of DVT or PE. A venous US was performed at prior facility and DVT was ruled out.    Hospital Course:  Admitted for IV antibiotics. Given Vanc x1 in ED. Zosyn scheduled.    Interval History: patient says her problem has resolved and wants to go home. The redness and pain she had on the R leg is gone.     Review of Systems   Constitutional: Negative for chills and fever.   Respiratory: Negative for shortness of breath.    Cardiovascular: Negative for chest pain.   Gastrointestinal: Negative for abdominal pain.   Genitourinary: Negative for dysuria.   Neurological: Positive for headaches.     Objective:     Vital Signs (Most Recent):  Temp: 98.2 °F (36.8 °C) (01/22/17 1906)  Pulse: 72 (01/22/17 1906)  Resp: 18 (01/22/17 1906)  BP: 130/78 (01/22/17 1906)  SpO2: 96 % (01/22/17 1906) Vital Signs (24h Range):  Temp:  [97.8 °F (36.6 °C)-98.2 °F (36.8 °C)] 98.2 °F (36.8 °C)  Pulse:  [63-84] 72  Resp:   [17-18] 18  SpO2:  [95 %-96 %] 96 %  BP: (102-130)/(55-78) 130/78     Weight: (!) 145.2 kg (320 lb)  Body mass index is 54.93 kg/(m^2).    Intake/Output Summary (Last 24 hours) at 01/22/17 2117  Last data filed at 01/22/17 1812   Gross per 24 hour   Intake          1523.33 ml   Output                0 ml   Net          1523.33 ml      Physical Exam   Constitutional: She appears well-developed and well-nourished. No distress.   HENT:   Head: Normocephalic and atraumatic.   Eyes: Conjunctivae and EOM are normal. Pupils are equal, round, and reactive to light.   Neck: Neck supple. No JVD present.   Cardiovascular: Normal rate and regular rhythm.    Murmur heard.  Pulmonary/Chest: Effort normal and breath sounds normal. No respiratory distress.   Abdominal: Soft. Bowel sounds are normal.   Musculoskeletal: She exhibits no edema.   Neurological: She is alert.   Skin: No erythema.   Psychiatric: She has a normal mood and affect. Her behavior is normal.   Vitals reviewed.      Significant Labs: All pertinent labs within the past 24 hours have been reviewed.    Significant Imaging: I have reviewed all pertinent imaging results/findings within the past 24 hours.    Assessment/Plan:      * Cellulitis of right lower extremity  Resolved. Transitioned to PO keflex. Will d/c tomorrow if conditions remains controlled.       Obesity, Class III, BMI 40-49.9 (morbid obesity)  Encourage weight-loss with diet and exercise      Non-intractable vomiting with nausea, resolved as of 1/22/2017  PRN antiemetics      VTE Risk Mitigation         Ordered     Medium Risk of VTE  Once      01/21/17 1752     Place sequential compression device  Until discontinued      01/21/17 1752     Place ADELIA hose  Until discontinued      01/21/17 1752          Melania Dejesus MD  Department of Hospital Medicine   [facility]

## 2017-01-23 NOTE — DISCHARGE SUMMARY
[facility] Hospital Medicine  Discharge Summary      Patient Name: Roya Booker  MRN: [id number]  Admission Date: 1/21/2017  Hospital Length of Stay: 2 days  Discharge Date and Time: 1/23/2017 9:40 AM  Attending Physician: No att. providers found   Discharging Provider: Melania Dejesus MD  Primary Care Provider: Charity Allen MD      HPI:   Miss Booker presents for evaluation of RIGHT lower extremity redness, pain which began 3 days ago. She presented to another facility and was admitted for cellulitis and was administered IV antibiotics (Clindamycin). She was admitted for about one day and was discharged with oral antibiotics (Clindamycin) which she did not fill. She reports her symptoms never improved. She reports experiencing fever, with increasing temperature (up to 103 F). She reports feeling dizzy and weak. She denies trauma. She denies insect bite. She denies history of skin infection. She denies allergy. She reports feeling nauseous and vomiting meals since Thursday. She denies history of DVT or PE. A venous US was performed at prior facility and DVT was ruled out.        Indwelling Lines/Drains at time of discharge:   Lines/Drains/Airways          No matching active lines, drains, or airways        Hospital Course:   Admitted for IV antibiotics. Vanc and Zosyn given. Patient felt significantly better and wanted to go home the next day. She was transitioned to PO antibiotics. She remained asymptomatic of her cellulitis overnight and was released to complete a 10 day oral course of antibiotics. She was given Fioricet for complaints of HA. She will follow up with her PCP in 1 week.      Consults: none      Significant Diagnostic Studies: Labs: CRP 98    Pending Diagnostic Studies:     None        Final Active Diagnoses:    Diagnosis Date Noted POA    PRINCIPAL PROBLEM:  Cellulitis of right lower extremity [L03.115] 01/21/2017 Yes    Obesity, Class III, BMI 40-49.9  (morbid obesity) [E66.01] 01/22/2017 Yes      Problems Resolved During this Admission:    Diagnosis Date Noted Date Resolved POA    Non-intractable vomiting with nausea [R11.2] 01/22/2017 01/22/2017 Yes      * Cellulitis of right lower extremity  Resolved. PO keflex x 10 days. Follow up with pcp in 1 week.       Obesity, Class III, BMI 40-49.9 (morbid obesity)  Encourage weight-loss with diet and exercise      Discharged Condition: good    Patient examined at bedside. Physical exam within normal limits.     Disposition: Home or Self Care    Follow Up:  Follow-up Information     Follow up with Charity Allen MD In 1 week.    Specialty:  Family Medicine    Why:  hospital follow up scheduled for 1-30-17 @ 9:45 a.m.    Contact information:    [address] [place] [address]  [place] [address] [phone]          Patient Instructions:     Diet general     Activity as tolerated     Call MD for:  increased confusion or weakness     Call MD for:  worsening rash     Call MD for:  difficulty breathing or increased cough       Medications:  Reconciled Home Medications:   Discharge Medication List as of 1/23/2017  9:53 AM      START taking these medications    Details   butalbital-acetaminophen-caffeine -40 mg (FIORICET, ESGIC) -40 mg per tablet Take 1 tablet by mouth every 4 (four) hours as needed for Pain., Starting 1/23/2017, Until Wed 2/22/17, Normal      cephALEXin (KEFLEX) 250 MG capsule Take 1 capsule (250 mg total) by mouth every 6 (six) hours., Starting 1/23/2017, Until Thu 2/2/17, Normal         CONTINUE these medications which have NOT CHANGED    Details   ibuprofen (ADVIL,MOTRIN) 800 MG tablet Take 1 tablet (800 mg total) by mouth 3 (three) times daily as needed., Starting 4/22/2015, Until Discontinued, Print      phentermine (ADIPEX-P) 37.5 mg tablet Take 37.5 mg by mouth before breakfast., Until Discontinued, Historical Med         STOP taking these medications       ondansetron (ZOFRAN) 4 MG tablet  Comments:   Reason for Stopping:         ondansetron (ZOFRAN-ODT) 4 MG TbDL Comments:   Reason for Stopping:         tramadol (ULTRAM) 50 mg tablet Comments:   Reason for Stopping:             Time spent on the discharge of patient: 35 minutes    Melania Dejesus MD  Department of Hospital Medicine  [facility]

## 2017-01-23 NOTE — DISCHARGE INSTRUCTIONS
Thank you for choosing [facility] for your medical care. The primary doctor who is taking care of you at the time of your discharge is Melania Dejesus MD.     You were admitted to the hospital with Cellulitis of right lower extremity.     Please note your discharge instructions, including diet/activity restrictions, follow-up appointments, and medication changes.  If you have any questions about your medical issues, prescriptions, or any other questions, please feel free to contact the [facility] Hospital Medicine Dept at [phone] and we will help.    If you are previously with Home health, outpatient PT/OT or under a therapy program, you are cleared to return to those programs.    Please direct all long term medication refills and follow up to your primary care provider, Primary Doctor No. Thank you again for letting us take care of your health care needs.    Please note the following discharge instructions per your discharging physician-  Take all medications as prescribed. Follow up with your doctor in 1 week.

## 2017-01-23 NOTE — PROGRESS NOTES
Pt given discharge instructions. Instructed to  prescriptions for Fiorcet and Cephalexin at [facility] pharmacy. Instructed to F/U with Dr. Allen 1/30/17 @ 0945. Educated on when to return to E.D. with worsening symptoms. Pt awaiting ride to home.

## 2017-01-23 NOTE — NURSING
Patient has been resting quietly all shift w/o complaint of nausea or vomiting.  Patient just now pressed call light to notify nurse that she vomited.  Anti-emetic given PRN per MD order.  Will monitor for effectiveness.

## 2017-01-23 NOTE — CONSULTS
Roya Booker [id number] is a 36 y.o. female who has been consulted for vancomycin dosing.    The patient has the following labs:     Date Creatinine (mg/dl)    BUN WBC Count   1/22/2017 Estimated Creatinine Clearance: 159.4 mL/min (based on Cr of 0.7). Lab Results   Component Value Date    BUN 6 01/22/2017     Lab Results   Component Value Date    WBC 5.40 01/22/2017        Current weight is (!) 145.2 kg (320 lb)    Pt is receiving vancomycin 1250 mg every 8 hours.  Vancomycin trough from 01/22 at 1754 was 15.8 mg/dL.  Target trough range is 10-15 mg/dL for cellulitis.   Trough was drawn on time and anticipate it is supra/therapeutic. Pharmacy will decrease regimen to a vancomycin dose of 1000 mg every 8 hours. A vancomycin trough has been ordered prior to 4th dose due 01/23 at 1800.      Patient will be followed by pharmacy for changes in renal function, toxicity, and efficacy.  Thank you for allowing us to participate in this patient's care.     Uvaldo Fernandez, JeanneD

## 2017-01-24 ENCOUNTER — TELEPHONE (OUTPATIENT)
Dept: MEDSURG UNIT | Facility: HOSPITAL | Age: 37
End: 2017-01-24

## 2017-01-25 ENCOUNTER — PATIENT OUTREACH (OUTPATIENT)
Dept: ADMINISTRATIVE | Facility: CLINIC | Age: 37
End: 2017-01-25

## 2017-01-25 NOTE — PATIENT INSTRUCTIONS
Discharge Instructions for Cellulitis  You have been diagnosed with cellulitis. This is an infection in the deepest layer of the skin. In some cases, the infection also affects the muscle. Cellulitis is caused by bacteria. The bacteria can enter the body through broken skin. This can happen with a cut, scratch, animal bite, or an insect bite that has been scratched. You may have been treated in the hospital with antibiotics and fluids. You will likely be given a prescription for antibiotics to take at home. This sheet will help you take care of yourself at home.  Home care  When you are home:  · Take the prescribed antibiotic medicine you are given as directed until it is gone. Take it even if you feel better. It treats the infection and stops it from returning. Not taking all the medicine can make future infections hard to treat.  · Keep the infected area clean.  · When possible, raise the infected area above the level of your heart. This helps keep swelling down.  · Talk with your healthcare provider if you are in pain. Ask what kind of over-the-counter medicine you can take for pain.  · Apply clean bandages as advised.  · Take your temperature once a day for a week.  · Wash your hands often to prevent spreading the infection.  In the future, wash your hands before and after you touch cuts, scratches, or bandages. This will help prevent infection.   When to call your healthcare provider  Call your healthcare provider immediately if you have any of the following:  · Difficulty or pain when moving the joints above or below the infected area  · Discharge or pus draining from the area  · Fever of 100.4°F (38°C) or higher, or as directed by your healthcare provider  · Pain that gets worse in or around the infected   · Redness that gets worse in or around the infected area, particularly if the area of redness expands to a wider area  · Shaking chills  · Swelling of the infected area  · Vomiting   © 1742-0258 [employer]  [employer]. [address] [place] [address], [place] [address]. All rights reserved. This information is not intended as a substitute for professional medical care. Always follow your healthcare professional's instructions.

## 2017-01-27 LAB
BACTERIA BLD CULT: NORMAL
BACTERIA BLD CULT: NORMAL

## 2019-07-25 ENCOUNTER — HOSPITAL ENCOUNTER (EMERGENCY)
Facility: HOSPITAL | Age: 39
Discharge: LEFT BEFORE DISCHARGE | End: 2019-07-25
Attending: EMERGENCY MEDICINE

## 2019-07-25 VITALS
BODY MASS INDEX: 51.91 KG/M2 | DIASTOLIC BLOOD PRESSURE: 98 MMHG | OXYGEN SATURATION: 99 % | HEART RATE: 88 BPM | WEIGHT: 293 LBS | RESPIRATION RATE: 20 BRPM | HEIGHT: 63 IN | TEMPERATURE: 98 F | SYSTOLIC BLOOD PRESSURE: 118 MMHG

## 2019-07-25 DIAGNOSIS — M79.606 LEG PAIN: ICD-10-CM

## 2019-07-25 DIAGNOSIS — L03.115 CELLULITIS OF RIGHT LOWER EXTREMITY: Primary | ICD-10-CM

## 2019-07-25 PROBLEM — J45.901 ASTHMA EXACERBATION: Status: ACTIVE | Noted: 2017-11-17

## 2019-07-25 PROBLEM — E66.9 OBESITY: Status: ACTIVE | Noted: 2017-01-11

## 2019-07-25 PROCEDURE — 93971 US LOWER EXTREMITY VEINS RIGHT: ICD-10-PCS | Mod: 26,RT,, | Performed by: RADIOLOGY

## 2019-07-25 PROCEDURE — 93971 EXTREMITY STUDY: CPT | Mod: TC,RT

## 2019-07-25 PROCEDURE — 99284 EMERGENCY DEPT VISIT MOD MDM: CPT | Mod: 25

## 2019-07-25 PROCEDURE — 93971 EXTREMITY STUDY: CPT | Mod: 26,RT,, | Performed by: RADIOLOGY

## 2019-07-25 RX ORDER — SULFAMETHOXAZOLE AND TRIMETHOPRIM 800; 160 MG/1; MG/1
1 TABLET ORAL 2 TIMES DAILY
Qty: 14 TABLET | Refills: 0 | Status: SHIPPED | OUTPATIENT
Start: 2019-07-25 | End: 2019-08-01

## 2019-07-25 RX ORDER — POTASSIUM CHLORIDE 750 MG/1
10 TABLET, EXTENDED RELEASE ORAL ONCE
COMMUNITY
End: 2022-03-10

## 2019-07-25 RX ORDER — TOPIRAMATE SPINKLE 25 MG/1
25 CAPSULE ORAL 2 TIMES DAILY
COMMUNITY
End: 2019-09-30

## 2019-07-25 RX ORDER — GABAPENTIN 300 MG/1
300 CAPSULE ORAL 3 TIMES DAILY
COMMUNITY
End: 2022-03-10

## 2019-07-25 RX ORDER — PROPRANOLOL HYDROCHLORIDE 20 MG/1
20 TABLET ORAL 2 TIMES DAILY
COMMUNITY
End: 2022-03-10

## 2019-07-25 RX ORDER — FUROSEMIDE 40 MG/1
40 TABLET ORAL 2 TIMES DAILY
COMMUNITY
End: 2022-03-10

## 2019-07-25 RX ORDER — DULOXETIN HYDROCHLORIDE 60 MG/1
60 CAPSULE, DELAYED RELEASE ORAL DAILY
COMMUNITY
End: 2019-09-30

## 2019-07-25 RX ORDER — QUETIAPINE FUMARATE 100 MG/1
TABLET, FILM COATED ORAL
COMMUNITY
End: 2019-09-30

## 2019-07-25 NOTE — ED PROVIDER NOTES
CHIEF COMPLAINT  Chief Complaint   Patient presents with    Leg Pain     right       HPI  Roya Osullivan a 38 y.o. female who presents to the ED with complaints of left lower leg pain. States she has had similar episodes many times in the past. Awakened this morning with lower leg pain and swelling.     CURRENT MEDICATIONS  No current facility-administered medications on file prior to encounter.      Current Outpatient Medications on File Prior to Encounter   Medication Sig Dispense Refill    DULoxetine (CYMBALTA) 60 MG capsule Take 60 mg by mouth once daily.      furosemide (LASIX) 40 MG tablet Take 40 mg by mouth 2 (two) times daily.      gabapentin (NEURONTIN) 300 MG capsule Take 300 mg by mouth 3 (three) times daily.      potassium chloride (KLOR-CON) 10 MEQ TbSR Take 10 mEq by mouth once.      propranolol (INDERAL) 20 MG tablet Take 20 mg by mouth 3 (three) times daily.      QUEtiapine (SEROQUEL) 100 MG Tab Take by mouth.      topiramate 25 mg capsule Take 25 mg by mouth 2 (two) times daily.      phentermine (ADIPEX-P) 37.5 mg tablet Take 37.5 mg by mouth before breakfast.      [DISCONTINUED] ibuprofen (ADVIL,MOTRIN) 800 MG tablet Take 1 tablet (800 mg total) by mouth 3 (three) times daily as needed. 20 tablet 0       ALLERGIES  Review of patient's allergies indicates:  No Known Allergies      There is no immunization history on file for this patient.    PAST MEDICAL HISTORY  Past Medical History:   Diagnosis Date    Anxiety     Depression     Migraine headache     Morbid obesity     Renal disorder     kidney stone       SURGICAL HISTORY  Past Surgical History:   Procedure Laterality Date    CHOLECYSTECTOMY      FRACTURE SURGERY  1984    right lower leg reconstruction surgery s/p trauma injury    TUBAL LIGATION         SOCIAL HISTORY  Social History     Socioeconomic History    Marital status:      Spouse name: Not on file    Number of children: Not on file    Years of  education: Not on file    Highest education level: Not on file   Occupational History    Not on file   Social Needs    Financial resource strain: Not on file    Food insecurity:     Worry: Not on file     Inability: Not on file    Transportation needs:     Medical: Not on file     Non-medical: Not on file   Tobacco Use    Smoking status: Current Every Day Smoker   Substance and Sexual Activity    Alcohol use: No    Drug use: Never    Sexual activity: Yes     Birth control/protection: None   Lifestyle    Physical activity:     Days per week: Not on file     Minutes per session: Not on file    Stress: Not on file   Relationships    Social connections:     Talks on phone: Not on file     Gets together: Not on file     Attends [employer] service: Not on file     Active member of club or organization: Not on file     Attends meetings of clubs or organizations: Not on file     Relationship status: Not on file   Other Topics Concern    Not on file   Social History Narrative    Not on file       FAMILY HISTORY  Family History   Problem Relation Age of Onset    Heart disease Mother        REVIEW OF SYSTEMS  Constitutional: No fever, chills, or weakness.  Eyes: No redness, pain, or discharge  HENT: No ear pain, no headache, no rhinorrhea, no throat pain  Respiratory: No cough, wheezing or shortness of breath  Cardiovascular: No chest pain, palpitations or edema  GI: No abdominal pain, nausea, vomiting or diarrhea  Gu: No dysuria, no hematuria, or discharge  Musculoskeletal: + right lower leg pain and swelling.  full range of motion. Good sensation  Skin: No rash or abrasion  Neurologic: No focal weakness or sensory changes.  All systems otherwise negative except as noted in the Review of Systems and History of Present Illness      PHYSICAL EXAM  Reviewed Triage Note  VITAL SIGNS:   Patient Vitals for the past 24 hrs:   BP Temp Temp src Pulse Resp SpO2 Height Weight   07/25/19 1303 (!) 118/98 98.3 °F (36.8 °C)  "Oral 88 20 99 % 5' 3" (1.6 m) 134.7 kg (297 lb)     Constitutional: Well developed, well nourished, Alert and oriented x3, No acute distress, non-toxic appearance.  HENT: Normocephalic, Atraumatic, Bilateral external ears normal, external nose negative, oropharynx moist, No oral exudates.  Eyes: PERRL, EOMI, Conjunctiva normal, No discharge.  Neck: Normal range of motion, no tenderness, supple  Respiratory: Normal breath sounds, no respiratory distress, no wheezing, no rhonchi, no rales  Cardiovascular: Normal heart rate, normal rhythm, no murmurs, no rubs, no gallops.  Gi: Bowel sounds normal, soft, no tenderness, non-distended, no masses, no pulsatile masses.  Musculoskeletal: Bilateral lower legs obese. Right lower leg with posterior calf hypertrophy. Tender to palpation. Slight warm to touch  Integument: Warm, Dry  Neurologic: Normal motor function, normal sensory function. No focal deficits noted. Intact distal pulses  Psychiatric: Affect normal, judgment normal, mood normal      LABS  Pertinent labs reviewed. (see chart for details)  Labs Reviewed - No data to display    RADIOLOGY  US Lower Extremity Veins Right   Final Result      No evidence of deep venous thrombosis in the right lower extremity.         Electronically signed by: Raheem Hughes   Date:    07/25/2019   Time:    15:23            PROCEDURE  Procedures      ED COURSE & MEDICAL DECISION MAKING     MDM       Physical exam findings discussed with patient. No acute emergent medical condition identified at this time to warrant further testing. Will dispo home with instructions to follow up with PCP, return to the ED for worsening condition. Pt agrees with plan of care.     DISPOSITION  Patient discharged in stable condition 7/25/2019  4:45 PM      CLINICAL IMPRESSION:  The primary encounter diagnosis was Cellulitis of right lower extremity. A diagnosis of Leg pain was also pertinent to this visit.    Patient advised to follow-up with your PCP within " 3 days for BP re-check if Blood Pressure was >120/80 without history of hypertension.         Mesha Ulrich, [facility]  07/25/19 2143

## 2019-09-30 ENCOUNTER — HOSPITAL ENCOUNTER (EMERGENCY)
Facility: HOSPITAL | Age: 39
Discharge: PSYCHIATRIC OR SUBSTANCE USE FACILITY | End: 2019-10-01
Attending: EMERGENCY MEDICINE

## 2019-09-30 DIAGNOSIS — R45.851 DEPRESSION WITH SUICIDAL IDEATION: Primary | ICD-10-CM

## 2019-09-30 DIAGNOSIS — F32.A DEPRESSION WITH SUICIDAL IDEATION: Primary | ICD-10-CM

## 2019-09-30 DIAGNOSIS — G20.A1 PARKINSON'S DISEASE: ICD-10-CM

## 2019-09-30 DIAGNOSIS — F15.10 AMPHETAMINE ABUSE: ICD-10-CM

## 2019-09-30 DIAGNOSIS — N39.0 URINARY TRACT INFECTION WITHOUT HEMATURIA, SITE UNSPECIFIED: ICD-10-CM

## 2019-09-30 LAB
ALBUMIN SERPL BCP-MCNC: 3.7 G/DL (ref 3.5–5.2)
ALP SERPL-CCNC: 56 U/L (ref 55–135)
ALT SERPL W/O P-5'-P-CCNC: 15 U/L (ref 10–44)
AMPHET+METHAMPHET UR QL: NORMAL
ANION GAP SERPL CALC-SCNC: 7 MMOL/L (ref 8–16)
APAP SERPL-MCNC: <10 UG/ML (ref 10–20)
AST SERPL-CCNC: 15 U/L (ref 10–40)
BACTERIA #/AREA URNS HPF: ABNORMAL /HPF
BARBITURATES UR QL SCN>200 NG/ML: NEGATIVE
BASOPHILS # BLD AUTO: 0.07 K/UL (ref 0–0.2)
BASOPHILS NFR BLD: 0.7 % (ref 0–1.9)
BENZODIAZ UR QL SCN>200 NG/ML: NEGATIVE
BILIRUB SERPL-MCNC: 0.9 MG/DL (ref 0.1–1)
BILIRUB UR QL STRIP: ABNORMAL
BUN SERPL-MCNC: 9 MG/DL (ref 6–20)
BZE UR QL SCN: NEGATIVE
CALCIUM SERPL-MCNC: 8.2 MG/DL (ref 8.7–10.5)
CANNABINOIDS UR QL SCN: NEGATIVE
CAOX CRY URNS QL MICRO: ABNORMAL
CHLORIDE SERPL-SCNC: 104 MMOL/L (ref 95–110)
CLARITY UR: ABNORMAL
CO2 SERPL-SCNC: 26 MMOL/L (ref 23–29)
COLOR UR: YELLOW
CREAT SERPL-MCNC: 0.7 MG/DL (ref 0.5–1.4)
CREAT UR-MCNC: 280.5 MG/DL (ref 15–325)
DIFFERENTIAL METHOD: NORMAL
EOSINOPHIL # BLD AUTO: 0.2 K/UL (ref 0–0.5)
EOSINOPHIL NFR BLD: 2.3 % (ref 0–8)
ERYTHROCYTE [DISTWIDTH] IN BLOOD BY AUTOMATED COUNT: 12 % (ref 11.5–14.5)
EST. GFR  (AFRICAN AMERICAN): >60 ML/MIN/1.73 M^2
EST. GFR  (NON AFRICAN AMERICAN): >60 ML/MIN/1.73 M^2
ETHANOL SERPL-MCNC: 9 MG/DL
GLUCOSE SERPL-MCNC: 90 MG/DL (ref 70–110)
GLUCOSE UR QL STRIP: NEGATIVE
HCT VFR BLD AUTO: 37.6 % (ref 37–48.5)
HGB BLD-MCNC: 12.5 G/DL (ref 12–16)
HGB UR QL STRIP: ABNORMAL
HYALINE CASTS #/AREA URNS LPF: 0 /LPF
IMM GRANULOCYTES # BLD AUTO: 0.03 K/UL (ref 0–0.04)
IMM GRANULOCYTES NFR BLD AUTO: 0.3 % (ref 0–0.5)
KETONES UR QL STRIP: ABNORMAL
LEUKOCYTE ESTERASE UR QL STRIP: ABNORMAL
LYMPHOCYTES # BLD AUTO: 2.7 K/UL (ref 1–4.8)
LYMPHOCYTES NFR BLD: 28.8 % (ref 18–48)
MCH RBC QN AUTO: 30.7 PG (ref 27–31)
MCHC RBC AUTO-ENTMCNC: 33.2 G/DL (ref 32–36)
MCV RBC AUTO: 92 FL (ref 82–98)
MICROSCOPIC COMMENT: ABNORMAL
MONOCYTES # BLD AUTO: 0.7 K/UL (ref 0.3–1)
MONOCYTES NFR BLD: 7.1 % (ref 4–15)
NEUTROPHILS # BLD AUTO: 5.7 K/UL (ref 1.8–7.7)
NEUTROPHILS NFR BLD: 60.8 % (ref 38–73)
NITRITE UR QL STRIP: POSITIVE
NRBC BLD-RTO: 0 /100 WBC
OPIATES UR QL SCN: NEGATIVE
PCP UR QL SCN>25 NG/ML: NEGATIVE
PH UR STRIP: 6 [PH] (ref 5–8)
PLATELET # BLD AUTO: 274 K/UL (ref 150–350)
PMV BLD AUTO: 9.9 FL (ref 9.2–12.9)
POTASSIUM SERPL-SCNC: 3.8 MMOL/L (ref 3.5–5.1)
PROT SERPL-MCNC: 7 G/DL (ref 6–8.4)
PROT UR QL STRIP: ABNORMAL
RBC # BLD AUTO: 4.07 M/UL (ref 4–5.4)
RBC #/AREA URNS HPF: >100 /HPF (ref 0–4)
SODIUM SERPL-SCNC: 137 MMOL/L (ref 136–145)
SP GR UR STRIP: 1.02 (ref 1–1.03)
T4 FREE SERPL-MCNC: 0.84 NG/DL (ref 0.71–1.51)
TOXICOLOGY INFORMATION: NORMAL
TSH SERPL DL<=0.005 MIU/L-ACNC: 6.95 UIU/ML (ref 0.34–5.6)
URN SPEC COLLECT METH UR: ABNORMAL
UROBILINOGEN UR STRIP-ACNC: NEGATIVE EU/DL
WBC # BLD AUTO: 9.45 K/UL (ref 3.9–12.7)
WBC #/AREA URNS HPF: 40 /HPF (ref 0–5)
WBC CLUMPS URNS QL MICRO: ABNORMAL

## 2019-09-30 PROCEDURE — 87088 URINE BACTERIA CULTURE: CPT

## 2019-09-30 PROCEDURE — 87086 URINE CULTURE/COLONY COUNT: CPT

## 2019-09-30 PROCEDURE — 87186 SC STD MICRODIL/AGAR DIL: CPT

## 2019-09-30 PROCEDURE — 80053 COMPREHEN METABOLIC PANEL: CPT

## 2019-09-30 PROCEDURE — 84439 ASSAY OF FREE THYROXINE: CPT

## 2019-09-30 PROCEDURE — 80320 DRUG SCREEN QUANTALCOHOLS: CPT

## 2019-09-30 PROCEDURE — 80329 ANALGESICS NON-OPIOID 1 OR 2: CPT

## 2019-09-30 PROCEDURE — 99285 EMERGENCY DEPT VISIT HI MDM: CPT

## 2019-09-30 PROCEDURE — 80307 DRUG TEST PRSMV CHEM ANLYZR: CPT

## 2019-09-30 PROCEDURE — 85025 COMPLETE CBC W/AUTO DIFF WBC: CPT

## 2019-09-30 PROCEDURE — 84443 ASSAY THYROID STIM HORMONE: CPT

## 2019-09-30 PROCEDURE — 81000 URINALYSIS NONAUTO W/SCOPE: CPT | Mod: 59

## 2019-09-30 PROCEDURE — 87077 CULTURE AEROBIC IDENTIFY: CPT

## 2019-09-30 RX ORDER — OMEPRAZOLE 40 MG/1
40 CAPSULE, DELAYED RELEASE ORAL DAILY
COMMUNITY
End: 2022-03-10

## 2019-09-30 NOTE — ED TRIAGE NOTES
I was just diagnosis with parkinson disease.  The medications I've been put on since I'm not doing well with.

## 2019-10-01 VITALS
BODY MASS INDEX: 49.17 KG/M2 | RESPIRATION RATE: 16 BRPM | SYSTOLIC BLOOD PRESSURE: 112 MMHG | OXYGEN SATURATION: 96 % | HEIGHT: 64 IN | TEMPERATURE: 98 F | DIASTOLIC BLOOD PRESSURE: 72 MMHG | WEIGHT: 288 LBS | HEART RATE: 80 BPM

## 2019-10-01 PROCEDURE — 25000003 PHARM REV CODE 250

## 2019-10-01 PROCEDURE — 25000003 PHARM REV CODE 250: Performed by: EMERGENCY MEDICINE

## 2019-10-01 RX ORDER — CEPHALEXIN 250 MG/1
1000 CAPSULE ORAL
Status: COMPLETED | OUTPATIENT
Start: 2019-10-01 | End: 2019-10-01

## 2019-10-01 RX ORDER — DULOXETIN HYDROCHLORIDE 60 MG/1
60 CAPSULE, DELAYED RELEASE ORAL DAILY
COMMUNITY
End: 2022-03-10

## 2019-10-01 RX ORDER — CEPHALEXIN 250 MG/1
CAPSULE ORAL
Status: DISCONTINUED
Start: 2019-10-01 | End: 2019-10-01 | Stop reason: HOSPADM

## 2019-10-01 RX ORDER — PROPRANOLOL HYDROCHLORIDE 10 MG/1
20 TABLET ORAL
Status: COMPLETED | OUTPATIENT
Start: 2019-10-01 | End: 2019-10-01

## 2019-10-01 RX ORDER — QUETIAPINE FUMARATE 100 MG/1
100 TABLET, FILM COATED ORAL
Status: COMPLETED | OUTPATIENT
Start: 2019-10-01 | End: 2019-10-01

## 2019-10-01 RX ORDER — QUETIAPINE FUMARATE 100 MG/1
100 TABLET, FILM COATED ORAL NIGHTLY
COMMUNITY
End: 2022-03-10

## 2019-10-01 RX ORDER — TOPIRAMATE 25 MG/1
25 TABLET ORAL 2 TIMES DAILY
COMMUNITY
End: 2022-03-10

## 2019-10-01 RX ORDER — QUETIAPINE FUMARATE 100 MG/1
TABLET, FILM COATED ORAL
Status: COMPLETED
Start: 2019-10-01 | End: 2019-10-01

## 2019-10-01 RX ADMIN — QUETIAPINE FUMARATE 100 MG: 100 TABLET, FILM COATED ORAL at 02:10

## 2019-10-01 RX ADMIN — QUETIAPINE FUMARATE 100 MG: 100 TABLET ORAL at 02:10

## 2019-10-01 RX ADMIN — PROPRANOLOL HYDROCHLORIDE 20 MG: 10 TABLET ORAL at 02:10

## 2019-10-01 RX ADMIN — CEPHALEXIN 1000 MG: 250 CAPSULE ORAL at 12:10

## 2019-10-01 NOTE — ED NOTES
Report given to SIDNEY Yee. Patient resting on stretcher. Eyes closed, respirations even and unlabored.

## 2019-10-01 NOTE — ED NOTES
Patient sitting up on stretcher. Eyes open, respirations even and unlabored. No acute distress noted. Intermittent tremors noted.

## 2019-10-01 NOTE — ED PROVIDER NOTES
Encounter Date: 9/30/2019       History     Chief Complaint   Patient presents with    suicidal/homicidal ideations     39-year-old female with history of Parkinson's some 1 year ago diagnosed - complains acute suicidal ideation where she feels that she will drive her car into a tree prompting her death as she is depressed and suicidal given her medical diagnoses    She was diagnosed at [facility]    She has returns to the community in the past months    She is not compliant with her Parkinson's medications    She has been doing some street drugs in lieu of her regular medications    She reports 1 prior mental health admission          No current facility-administered medications for this encounter.     Current Outpatient Medications:     omeprazole (PRILOSEC) 40 MG capsule, Take 40 mg by mouth once daily., Disp: , Rfl:     furosemide (LASIX) 40 MG tablet, Take 40 mg by mouth 2 (two) times daily., Disp: , Rfl:     gabapentin (NEURONTIN) 300 MG capsule, Take 300 mg by mouth 3 (three) times daily., Disp: , Rfl:     potassium chloride (KLOR-CON) 10 MEQ TbSR, Take 10 mEq by mouth once., Disp: , Rfl:     propranolol (INDERAL) 20 MG tablet, Take 20 mg by mouth 3 (three) times daily., Disp: , Rfl:           Review of patient's allergies indicates:  No Known Allergies  Past Medical History:   Diagnosis Date    Anxiety     Depression     Migraine headache     Morbid obesity     Renal disorder     kidney stone     Past Surgical History:   Procedure Laterality Date    CHOLECYSTECTOMY      FRACTURE SURGERY  1984    right lower leg reconstruction surgery s/p trauma injury    TUBAL LIGATION       Family History   Problem Relation Age of Onset    Heart disease Mother      Social History     Tobacco Use    Smoking status: Current Every Day Smoker   Substance Use Topics    Alcohol use: No    Drug use: Never     Review of Systems   Constitutional: Negative.    HENT: Negative.    Respiratory:  Negative.    Cardiovascular: Negative.    Gastrointestinal: Negative.    Genitourinary: Negative.  Menstrual problem:  has just completed her last menstrual cycle.   Musculoskeletal: Negative.    Skin: Negative.    Neurological: Positive for tremors. Negative for dizziness, seizures, syncope, facial asymmetry, speech difficulty, weakness, light-headedness, numbness and headaches.   Hematological: Negative.    Psychiatric/Behavioral: Positive for dysphoric mood, sleep disturbance (States she over sleeps) and suicidal ideas. Negative for agitation, behavioral problems, decreased concentration, hallucinations and self-injury. The patient is not nervous/anxious and is not hyperactive.    All other systems reviewed and are negative.      Physical Exam     Initial Vitals [09/30/19 1813]   BP Pulse Resp Temp SpO2   (!) 129/90 95 16 98.1 °F (36.7 °C) 96 %      MAP       --         Physical Exam    Nursing note and vitals reviewed.  Constitutional: She appears well-developed and well-nourished. She is not diaphoretic. No distress.   HENT:   Head: Normocephalic and atraumatic.   Nose: Nose normal.   Mouth/Throat: Oropharynx is clear and moist. No oropharyngeal exudate.   Eyes: Conjunctivae and EOM are normal. Pupils are equal, round, and reactive to light. Right eye exhibits no discharge. Left eye exhibits no discharge. No scleral icterus.   Neck: Normal range of motion. Neck supple.   Cardiovascular: Normal rate, regular rhythm, normal heart sounds and intact distal pulses. Exam reveals no gallop and no friction rub.    No murmur heard.  Pulmonary/Chest: Breath sounds normal. No respiratory distress. She has no wheezes. She has no rhonchi. She has no rales.   Abdominal: Soft. Bowel sounds are normal. She exhibits no distension and no mass. There is no tenderness. There is no rebound and no guarding.   Musculoskeletal: Normal range of motion. She exhibits no edema or tenderness.   Lymphadenopathy:     She has no cervical  adenopathy.   Neurological: She is alert and oriented to person, place, and time. She has normal strength. No cranial nerve deficit or sensory deficit.   Skin: Skin is warm and dry. Capillary refill takes less than 2 seconds. No rash noted. No erythema. No pallor.   Psychiatric: Her speech is normal. Judgment normal. She is slowed. She is not actively hallucinating. Thought content is not paranoid and not delusional. Cognition and memory are normal. She exhibits a depressed mood. She expresses suicidal ideation. She expresses no homicidal ideation. She expresses suicidal plans. She expresses no homicidal plans. She is attentive.         ED Course   Procedures  Labs Reviewed   COMPREHENSIVE METABOLIC PANEL - Abnormal; Notable for the following components:       Result Value    Calcium 8.2 (*)     Anion Gap 7 (*)     All other components within normal limits   TSH - Abnormal; Notable for the following components:    TSH 6.951 (*)     All other components within normal limits   URINALYSIS, REFLEX TO URINE CULTURE - Abnormal; Notable for the following components:    Appearance, UA Hazy (*)     Protein, UA 2+ (*)     Ketones, UA Trace (*)     Bilirubin (UA) 1+ (*)     Occult Blood UA 3+ (*)     Nitrite, UA Positive (*)     Leukocytes, UA Trace (*)     All other components within normal limits    Narrative:     Preferred Collection Type->Urine, Clean Catch   URINALYSIS MICROSCOPIC - Abnormal; Notable for the following components:    RBC, UA >100 (*)     WBC, UA 40 (*)     Bacteria Many (*)     Ca Oxalate Aruna, UA Many (*)     All other components within normal limits    Narrative:     Preferred Collection Type->Urine, Clean Catch   CULTURE, URINE   CBC W/ AUTO DIFFERENTIAL   DRUG SCREEN PANEL, URINE EMERGENCY    Narrative:     Preferred Collection Type->Urine, Clean Catch   ALCOHOL,MEDICAL (ETHANOL)   ACETAMINOPHEN LEVEL   T4, FREE     Admission on 09/30/2019   Component Date Value Ref Range Status    WBC 09/30/2019 9.45   3.90 - 12.70 K/uL Final    RBC 09/30/2019 4.07  4.00 - 5.40 M/uL Final    Hemoglobin 09/30/2019 12.5  12.0 - 16.0 g/dL Final    Hematocrit 09/30/2019 37.6  37.0 - 48.5 % Final    Mean Corpuscular Volume 09/30/2019 92  82 - 98 fL Final    Mean Corpuscular Hemoglobin 09/30/2019 30.7  27.0 - 31.0 pg Final    Mean Corpuscular Hemoglobin Conc 09/30/2019 33.2  32.0 - 36.0 g/dL Final    RDW 09/30/2019 12.0  11.5 - 14.5 % Final    Platelets 09/30/2019 274  150 - 350 K/uL Final    MPV 09/30/2019 9.9  9.2 - 12.9 fL Final    Immature Granulocytes 09/30/2019 0.3  0.0 - 0.5 % Final    Gran # (ANC) 09/30/2019 5.7  1.8 - 7.7 K/uL Final    Immature Grans (Abs) 09/30/2019 0.03  0.00 - 0.04 K/uL Final    Comment: Mild elevation in immature granulocytes is non specific and   can be seen in a variety of conditions including stress response,   acute inflammation, trauma and pregnancy. Correlation with other   laboratory and clinical findings is essential.      Lymph # 09/30/2019 2.7  1.0 - 4.8 K/uL Final    Mono # 09/30/2019 0.7  0.3 - 1.0 K/uL Final    Eos # 09/30/2019 0.2  0.0 - 0.5 K/uL Final    Baso # 09/30/2019 0.07  0.00 - 0.20 K/uL Final    nRBC 09/30/2019 0  0 /100 WBC Final    Gran% 09/30/2019 60.8  38.0 - 73.0 % Final    Lymph% 09/30/2019 28.8  18.0 - 48.0 % Final    Mono% 09/30/2019 7.1  4.0 - 15.0 % Final    Eosinophil% 09/30/2019 2.3  0.0 - 8.0 % Final    Basophil% 09/30/2019 0.7  0.0 - 1.9 % Final    Differential Method 09/30/2019 Automated   Final    Sodium 09/30/2019 137  136 - 145 mmol/L Final    Potassium 09/30/2019 3.8  3.5 - 5.1 mmol/L Final    Chloride 09/30/2019 104  95 - 110 mmol/L Final    CO2 09/30/2019 26  23 - 29 mmol/L Final    Glucose 09/30/2019 90  70 - 110 mg/dL Final    BUN, Bld 09/30/2019 9  6 - 20 mg/dL Final    Creatinine 09/30/2019 0.7  0.5 - 1.4 mg/dL Final    Calcium 09/30/2019 8.2* 8.7 - 10.5 mg/dL Final    Total Protein 09/30/2019 7.0  6.0 - 8.4 g/dL Final     Albumin 09/30/2019 3.7  3.5 - 5.2 g/dL Final    Total Bilirubin 09/30/2019 0.9  0.1 - 1.0 mg/dL Final    Comment: For infants and newborns, interpretation of results should be based  on gestational age, weight and in agreement with clinical  observations.  Premature Infant recommended reference ranges:  Up to 24 hours.............<8.0 mg/dL  Up to 48 hours............<12.0 mg/dL  3-5 days..................<15.0 mg/dL  6-29 days.................<15.0 mg/dL      Alkaline Phosphatase 09/30/2019 56  55 - 135 U/L Final    AST 09/30/2019 15  10 - 40 U/L Final    ALT 09/30/2019 15  10 - 44 U/L Final    Anion Gap 09/30/2019 7* 8 - 16 mmol/L Final    eGFR if African American 09/30/2019 >60.0  >60 mL/min/1.73 m^2 Final    eGFR if non African American 09/30/2019 >60.0  >60 mL/min/1.73 m^2 Final    Comment: Calculation used to obtain the estimated glomerular filtration  rate (eGFR) is the CKD-EPI equation.       TSH 09/30/2019 6.951* 0.340 - 5.600 uIU/mL Final    Specimen UA 09/30/2019 Urine, Clean Catch   Final    Color, UA 09/30/2019 Yellow  Yellow, Straw, Judi Final    Appearance, UA 09/30/2019 Hazy* Clear Final    pH, UA 09/30/2019 6.0  5.0 - 8.0 Final    Specific Gravity, UA 09/30/2019 1.025  1.005 - 1.030 Final    Protein, UA 09/30/2019 2+* Negative Final    Comment: Recommend a 24 hour urine protein or a urine   protein/creatinine ratio if globulin induced proteinuria is  clinically suspected.      Glucose, UA 09/30/2019 Negative  Negative Final    Ketones, UA 09/30/2019 Trace* Negative Final    Bilirubin (UA) 09/30/2019 1+* Negative Final    Comment: Positive urine bilirubin is not confirmed. Correlate with   serum bilirubin and clinical presentation.      Occult Blood UA 09/30/2019 3+* Negative Final    Nitrite, UA 09/30/2019 Positive* Negative Final    Urobilinogen, UA 09/30/2019 Negative  Negative EU/dL Final    Leukocytes, UA 09/30/2019 Trace* Negative Final    Benzodiazepines 09/30/2019  Negative   Final    Cocaine (Metab.) 09/30/2019 Negative   Final    Opiate Scrn, Ur 09/30/2019 Negative   Final    Barbiturate Screen, Ur 09/30/2019 Negative   Final    Amphetamine Screen, Ur 09/30/2019 Presumptive Positive   Final    THC 09/30/2019 Negative   Final    Phencyclidine 09/30/2019 Negative   Final    Creatinine, Random Ur 09/30/2019 280.5  15.0 - 325.0 mg/dL Final    Comment: The random urine reference ranges provided were established   for 24 hour urine collections.  No reference ranges exist for  random urine specimens.  Correlate clinically.      Toxicology Information 09/30/2019 SEE COMMENT   Final    Comment: This screen includes the following classes of drugs at the   listed cut-off:  Benzodiazepines                  200 ng/ml  Cocaine metabolite               300 ng/ml  Opiates                         2000 ng/ml  Barbiturates                     200 ng/ml  Amphetamines                    1000 ng/ml  Marijuana metabs (THC)            50 ng/ml  Phencyclidine (PCP)               25 ng/ml  High concentrations of Methylenedioxymethamphetamine (MDMA aka  Ectasy) and other structurally similar compounds may cross-   react with the Amphetamine/Methamphetamine screening   immunoassay giving a false positive result.  Note: This exception list includes only more common   interferants in toxicology screen testing.  Because of many   cross-reactantspositive results on toxicology drug screens   should be confirmed whenever results do not correlate with   clinical presentation.  This report is intended for use in clinical monitoring and  management of patients. It is not intended for use in   employment                            related drug testing.  Because of any cross-reactants, positive results on toxicology  drug screens should be confirmed whenever results do not  correlate with clinical presentation.  Presumptive positive results are unconfirmed and may be used   only for medical purposes.       Alcohol, Medical, Serum 09/30/2019 9  <10 mg/dL Final    Acetaminophen (Tylenol), Serum 09/30/2019 <10.0  10.0 - 20.0 ug/mL Final    Comment: Toxic Levels:  Adults (4 hr post-ingestion).........>150 ug/mL  Adults (12 hr post-ingestion)........>40 ug/mL  Peds (2 hr post-ingestion, liquid)...>225 ug/mL      RBC, UA 09/30/2019 >100* 0 - 4 /hpf Final    WBC, UA 09/30/2019 40* 0 - 5 /hpf Final    WBC Clumps, UA 09/30/2019 None  None-Rare Final    Bacteria 09/30/2019 Many* None-Occ /hpf Final    Hyaline Casts, UA 09/30/2019 0  0-1/lpf /lpf Final    Ca Oxalate Aruna, UA 09/30/2019 Many* None-Moderate Final    Microscopic Comment 09/30/2019 SEE COMMENT   Final    Comment: Other formed elements not mentioned in the report are not   present in the microscopic examination.       Free T4 09/30/2019 0.84  0.71 - 1.51 ng/dL Final             Imaging Results    None          Medical Decision Making:   Clinical Tests:   Lab Tests: Ordered and Reviewed  ED Management:  Acute depression with suicidal ideation secondary to medical condition  Urine drug screen positive for amphetamine  No evidence of acute amphetamine intoxication  Asymptomatic UTI      She is medically stable at this time for inpatient mental health evaluation and stabilization    Additional MDM:   Psych: The patient has been medically cleared.                    Clinical Impression:       ICD-10-CM ICD-9-CM   1. Depression with suicidal ideation F32.9 311    R45.851 V62.84   2. Parkinson's disease G20 332.0   3. Urinary tract infection without hematuria, site unspecified N39.0 599.0   4. Amphetamine abuse F15.10 305.70         Disposition:   Disposition: Transferred  Condition: Stable                        Marques Miller MD  10/16/19 2848

## 2019-10-01 NOTE — ED NOTES
"RN to bedside to administer requested nighttime medication as requested by patient. Patient still has jewelery and an ipad in her possession. RN explains that all personal belongings must be secured. Patient becomes frustrated by this, stating "I just want to go." RN explains that her being discharged is Dr. Miller's decision. Patient requests to call her , stating "I just want to talk to him, he can calm me down." RN informs patient that this will be allowed as long as the patient is in control of her actions and her behavior does not escalate while on the phone. She verbalizes understanding of this and RN gives patient portable phone to use.  "

## 2019-10-01 NOTE — ED NOTES
"Agustina reports to RN that after phone conversation, patient kicked phone off bed.    Patient's  comes to ED. RN explains the interaction that had occurred with the patient. Patient's  states he came to ED in an attempt to "talk her down." Patient's  confirms to RN that the patient has poor boundaries and does not like to "follow rules." He states that the patient tried to tell him that ED staff was "bullying her." He states "I talked to her and told her that she needs to go wherever they can send her. She'll come out a better person and I won't have to leave her. If she doesn't get some help, I don't know what I'm gonna do."    Patient's  leaves ED and requests to be called and updated with any information.  "

## 2019-10-01 NOTE — ED NOTES
RN calls [facility], spoke with Evelyn. Informed her of patient's need for psych placement. ADT 22 submitted. She verbalizes understanding.

## 2019-10-01 NOTE — ED NOTES
"RN at bedside to assess patient. Patient slouched in stretcher. Eyes open, respirations even and unlabored. No acute distress noted. Patient tells RN she has recently been diagnosed with Parkinson's disease and does not want to live anymore. She states she knows this will take over her life and she does not want to be here long enough to be a burden or have to go into a facility. Patient states her physician has told her she can't be left alone and states "there ain't no way in hell I'm going to live in a nursing home, that ain't happening." Patient's  is at bedside, supportive of the patient. He states she was last hospitalized in [place] for mental health and then was transferred to [facility] in [place], [place], for further treatment. He states he was an over the road  at the time and when she was released, she traveled with him for approximately 1 month. He states that he works locally now to look after his wife. The patient's  states that she needs to be hospitalized again because she is depressed, having told him multiple times that she wishes she didn't exist. Patient's  states that the patient also does not take her medications as prescribed, only taking them "when she wants to."    Tremors noted in left lower extremity during assessment, particularly when patient becomes frustrated or emotional.  "

## 2019-10-01 NOTE — ED NOTES
Pt reports re-initiating prescribed medication regiment for the past 5 days after a period of non-compliance.

## 2019-10-01 NOTE — ED NOTES
Patient given juice and crackers per patient request. No other immediate needs voiced at this time.

## 2019-10-01 NOTE — ED NOTES
Bo calls from [facility]. Patient is accepted to [facility] to Dr. Rajesh Toth. Patient to go to room 2024. Number for report is [phone].    RN calls [facility], spoke with Elyssa. She estimates an ambulance will be to ED to transport patient at 0630 or 0700.

## 2019-10-01 NOTE — ED NOTES
Bo from [facility] calls ED, speaks to RN. Requests a copy of the chart and a medical necessity form signed by the patient be faxed.

## 2019-10-03 LAB — BACTERIA UR CULT: ABNORMAL

## 2019-12-13 ENCOUNTER — HOSPITAL ENCOUNTER (OUTPATIENT)
Facility: HOSPITAL | Age: 39
Discharge: PSYCHIATRIC OR SUBSTANCE USE FACILITY | End: 2019-12-16
Attending: FAMILY MEDICINE | Admitting: INTERNAL MEDICINE
Payer: MEDICAID

## 2019-12-13 DIAGNOSIS — T50.901A OVERDOSE: ICD-10-CM

## 2019-12-13 DIAGNOSIS — T50.902A INTENTIONAL DRUG OVERDOSE, INITIAL ENCOUNTER: Primary | ICD-10-CM

## 2019-12-13 DIAGNOSIS — R53.1 WEAKNESS: ICD-10-CM

## 2019-12-13 PROBLEM — R45.851 SUICIDAL IDEATION: Status: ACTIVE | Noted: 2019-12-13

## 2019-12-13 LAB
ALBUMIN SERPL BCP-MCNC: 3.4 G/DL (ref 3.5–5.2)
ALP SERPL-CCNC: 52 U/L (ref 55–135)
ALT SERPL W/O P-5'-P-CCNC: 16 U/L (ref 10–44)
AMPHET+METHAMPHET UR QL: NORMAL
ANION GAP SERPL CALC-SCNC: 7 MMOL/L (ref 8–16)
APAP SERPL-MCNC: <10 UG/ML (ref 10–20)
APAP SERPL-MCNC: <10 UG/ML (ref 10–20)
AST SERPL-CCNC: 27 U/L (ref 10–40)
B-HCG UR QL: NEGATIVE
BACTERIA #/AREA URNS HPF: ABNORMAL /HPF
BARBITURATES UR QL SCN>200 NG/ML: NEGATIVE
BASOPHILS # BLD AUTO: 0.05 K/UL (ref 0–0.2)
BASOPHILS NFR BLD: 0.8 % (ref 0–1.9)
BENZODIAZ UR QL SCN>200 NG/ML: NEGATIVE
BILIRUB SERPL-MCNC: 1.5 MG/DL (ref 0.1–1)
BILIRUB UR QL STRIP: NEGATIVE
BUN SERPL-MCNC: 8 MG/DL (ref 6–20)
BZE UR QL SCN: NEGATIVE
CALCIUM SERPL-MCNC: 7.8 MG/DL (ref 8.7–10.5)
CANNABINOIDS UR QL SCN: NORMAL
CHLORIDE SERPL-SCNC: 109 MMOL/L (ref 95–110)
CLARITY UR: CLEAR
CO2 SERPL-SCNC: 20 MMOL/L (ref 23–29)
COLOR UR: YELLOW
CREAT SERPL-MCNC: 0.6 MG/DL (ref 0.5–1.4)
CREAT UR-MCNC: 211.9 MG/DL (ref 15–325)
DIFFERENTIAL METHOD: ABNORMAL
EOSINOPHIL # BLD AUTO: 0.2 K/UL (ref 0–0.5)
EOSINOPHIL NFR BLD: 2.9 % (ref 0–8)
ERYTHROCYTE [DISTWIDTH] IN BLOOD BY AUTOMATED COUNT: 12.2 % (ref 11.5–14.5)
EST. GFR  (AFRICAN AMERICAN): >60 ML/MIN/1.73 M^2
EST. GFR  (NON AFRICAN AMERICAN): >60 ML/MIN/1.73 M^2
ETHANOL SERPL-MCNC: <5 MG/DL
GIANT PLATELETS BLD QL SMEAR: PRESENT
GLUCOSE SERPL-MCNC: 87 MG/DL (ref 70–110)
GLUCOSE UR QL STRIP: NEGATIVE
HCT VFR BLD AUTO: 38.4 % (ref 37–48.5)
HGB BLD-MCNC: 12.7 G/DL (ref 12–16)
HGB UR QL STRIP: ABNORMAL
IMM GRANULOCYTES # BLD AUTO: 0.06 K/UL (ref 0–0.04)
IMM GRANULOCYTES NFR BLD AUTO: 0.9 % (ref 0–0.5)
KETONES UR QL STRIP: NEGATIVE
LEUKOCYTE ESTERASE UR QL STRIP: NEGATIVE
LYMPHOCYTES # BLD AUTO: 1.8 K/UL (ref 1–4.8)
LYMPHOCYTES NFR BLD: 27.3 % (ref 18–48)
MCH RBC QN AUTO: 30.6 PG (ref 27–31)
MCHC RBC AUTO-ENTMCNC: 33.1 G/DL (ref 32–36)
MCV RBC AUTO: 93 FL (ref 82–98)
MICROSCOPIC COMMENT: ABNORMAL
MONOCYTES # BLD AUTO: 0.5 K/UL (ref 0.3–1)
MONOCYTES NFR BLD: 8 % (ref 4–15)
NEUTROPHILS # BLD AUTO: 3.9 K/UL (ref 1.8–7.7)
NEUTROPHILS NFR BLD: 60.1 % (ref 38–73)
NITRITE UR QL STRIP: POSITIVE
NRBC BLD-RTO: 0 /100 WBC
OPIATES UR QL SCN: NEGATIVE
PCP UR QL SCN>25 NG/ML: NEGATIVE
PH UR STRIP: 7 [PH] (ref 5–8)
PLATELET # BLD AUTO: 244 K/UL (ref 150–350)
PLATELET BLD QL SMEAR: ABNORMAL
PMV BLD AUTO: 10.3 FL (ref 9.2–12.9)
POTASSIUM SERPL-SCNC: 4.6 MMOL/L (ref 3.5–5.1)
PROT SERPL-MCNC: 6.7 G/DL (ref 6–8.4)
PROT UR QL STRIP: NEGATIVE
RBC # BLD AUTO: 4.15 M/UL (ref 4–5.4)
RBC #/AREA URNS HPF: 15 /HPF (ref 0–4)
SALICYLATES SERPL-MCNC: <4 MG/DL (ref 15–30)
SODIUM SERPL-SCNC: 136 MMOL/L (ref 136–145)
SP GR UR STRIP: 1.02 (ref 1–1.03)
TOXICOLOGY INFORMATION: NORMAL
URN SPEC COLLECT METH UR: ABNORMAL
UROBILINOGEN UR STRIP-ACNC: NEGATIVE EU/DL
WBC # BLD AUTO: 6.52 K/UL (ref 3.9–12.7)
WBC #/AREA URNS HPF: 8 /HPF (ref 0–5)

## 2019-12-13 PROCEDURE — 63600175 PHARM REV CODE 636 W HCPCS: Performed by: FAMILY MEDICINE

## 2019-12-13 PROCEDURE — 93010 ELECTROCARDIOGRAM REPORT: CPT | Mod: ,,, | Performed by: INTERNAL MEDICINE

## 2019-12-13 PROCEDURE — 80329 ANALGESICS NON-OPIOID 1 OR 2: CPT

## 2019-12-13 PROCEDURE — 93005 ELECTROCARDIOGRAM TRACING: CPT

## 2019-12-13 PROCEDURE — 81025 URINE PREGNANCY TEST: CPT

## 2019-12-13 PROCEDURE — 51702 INSERT TEMP BLADDER CATH: CPT

## 2019-12-13 PROCEDURE — 36415 COLL VENOUS BLD VENIPUNCTURE: CPT

## 2019-12-13 PROCEDURE — 85025 COMPLETE CBC W/AUTO DIFF WBC: CPT

## 2019-12-13 PROCEDURE — 96374 THER/PROPH/DIAG INJ IV PUSH: CPT

## 2019-12-13 PROCEDURE — 99220 PR INITIAL OBSERVATION CARE,LEVL III: ICD-10-PCS | Mod: ,,, | Performed by: INTERNAL MEDICINE

## 2019-12-13 PROCEDURE — 80320 DRUG SCREEN QUANTALCOHOLS: CPT

## 2019-12-13 PROCEDURE — G0378 HOSPITAL OBSERVATION PER HR: HCPCS

## 2019-12-13 PROCEDURE — 80329 ANALGESICS NON-OPIOID 1 OR 2: CPT | Mod: 59

## 2019-12-13 PROCEDURE — 81000 URINALYSIS NONAUTO W/SCOPE: CPT | Mod: 59

## 2019-12-13 PROCEDURE — 80053 COMPREHEN METABOLIC PANEL: CPT

## 2019-12-13 PROCEDURE — 80307 DRUG TEST PRSMV CHEM ANLYZR: CPT

## 2019-12-13 PROCEDURE — 96361 HYDRATE IV INFUSION ADD-ON: CPT

## 2019-12-13 PROCEDURE — 99220 PR INITIAL OBSERVATION CARE,LEVL III: CPT | Mod: ,,, | Performed by: INTERNAL MEDICINE

## 2019-12-13 PROCEDURE — 93010 EKG 12-LEAD: ICD-10-PCS | Mod: ,,, | Performed by: INTERNAL MEDICINE

## 2019-12-13 PROCEDURE — 99285 EMERGENCY DEPT VISIT HI MDM: CPT | Mod: 25

## 2019-12-13 RX ORDER — GABAPENTIN 300 MG/1
300 CAPSULE ORAL 3 TIMES DAILY
Status: DISCONTINUED | OUTPATIENT
Start: 2019-12-13 | End: 2019-12-16 | Stop reason: HOSPADM

## 2019-12-13 RX ORDER — AMOXICILLIN 250 MG
1 CAPSULE ORAL 2 TIMES DAILY
Status: DISCONTINUED | OUTPATIENT
Start: 2019-12-13 | End: 2019-12-16 | Stop reason: HOSPADM

## 2019-12-13 RX ORDER — FUROSEMIDE 20 MG/1
40 TABLET ORAL 2 TIMES DAILY
Status: DISCONTINUED | OUTPATIENT
Start: 2019-12-13 | End: 2019-12-16 | Stop reason: HOSPADM

## 2019-12-13 RX ORDER — LORAZEPAM 2 MG/ML
1 INJECTION INTRAMUSCULAR
Status: DISCONTINUED | OUTPATIENT
Start: 2019-12-13 | End: 2019-12-16 | Stop reason: HOSPADM

## 2019-12-13 RX ORDER — ONDANSETRON 2 MG/ML
4 INJECTION INTRAMUSCULAR; INTRAVENOUS EVERY 8 HOURS PRN
Status: DISCONTINUED | OUTPATIENT
Start: 2019-12-13 | End: 2019-12-16 | Stop reason: HOSPADM

## 2019-12-13 RX ORDER — POTASSIUM CHLORIDE 750 MG/1
10 TABLET, EXTENDED RELEASE ORAL ONCE
Status: DISCONTINUED | OUTPATIENT
Start: 2019-12-13 | End: 2019-12-16 | Stop reason: HOSPADM

## 2019-12-13 RX ORDER — SODIUM CHLORIDE 0.9 % (FLUSH) 0.9 %
10 SYRINGE (ML) INJECTION
Status: DISCONTINUED | OUTPATIENT
Start: 2019-12-13 | End: 2019-12-16 | Stop reason: HOSPADM

## 2019-12-13 RX ORDER — DULOXETIN HYDROCHLORIDE 30 MG/1
60 CAPSULE, DELAYED RELEASE ORAL DAILY
Status: DISCONTINUED | OUTPATIENT
Start: 2019-12-14 | End: 2019-12-16 | Stop reason: HOSPADM

## 2019-12-13 RX ORDER — SODIUM CHLORIDE 9 MG/ML
INJECTION, SOLUTION INTRAVENOUS CONTINUOUS
Status: DISCONTINUED | OUTPATIENT
Start: 2019-12-13 | End: 2019-12-16 | Stop reason: HOSPADM

## 2019-12-13 RX ORDER — TOPIRAMATE 25 MG/1
25 TABLET ORAL 2 TIMES DAILY
Status: DISCONTINUED | OUTPATIENT
Start: 2019-12-13 | End: 2019-12-16 | Stop reason: HOSPADM

## 2019-12-13 RX ORDER — QUETIAPINE FUMARATE 25 MG/1
100 TABLET, FILM COATED ORAL NIGHTLY
Status: DISCONTINUED | OUTPATIENT
Start: 2019-12-13 | End: 2019-12-16 | Stop reason: HOSPADM

## 2019-12-13 RX ORDER — PANTOPRAZOLE SODIUM 40 MG/10ML
40 INJECTION, POWDER, LYOPHILIZED, FOR SOLUTION INTRAVENOUS DAILY
Status: DISCONTINUED | OUTPATIENT
Start: 2019-12-14 | End: 2019-12-16 | Stop reason: HOSPADM

## 2019-12-13 RX ORDER — ONDANSETRON 2 MG/ML
4 INJECTION INTRAMUSCULAR; INTRAVENOUS
Status: COMPLETED | OUTPATIENT
Start: 2019-12-13 | End: 2019-12-13

## 2019-12-13 RX ORDER — PROPRANOLOL HYDROCHLORIDE 10 MG/1
20 TABLET ORAL 2 TIMES DAILY
Status: DISCONTINUED | OUTPATIENT
Start: 2019-12-13 | End: 2019-12-16 | Stop reason: HOSPADM

## 2019-12-13 RX ADMIN — ONDANSETRON 4 MG: 2 INJECTION INTRAMUSCULAR; INTRAVENOUS at 03:12

## 2019-12-13 RX ADMIN — SODIUM CHLORIDE: 0.9 INJECTION, SOLUTION INTRAVENOUS at 04:12

## 2019-12-13 NOTE — NURSING
Received pt from ER via stretcher.  Awake and oriented.  Resp even and unlabored.  No complaints at this time.  Butler cath to gravity is patent and draining dark yellow urine.   Tearful at times.  Cooperative at the moment.  Close observation maintained.  See flowsheet for further assessment.

## 2019-12-13 NOTE — ED NOTES
Pt reports now that she also took 7 tablets of neurontin 500mg at same time as tylenol pm. Pt is unable to tell RN what time medication was taken.

## 2019-12-13 NOTE — NURSING
Pt. Arrived via stretcher, from er. Pt. Placed in icu 10, cont. Cardiac monitoring started, no complications at this time. Pt. Coherent and calm at this time. Sitter to remain at bedside.

## 2019-12-13 NOTE — PLAN OF CARE
"   12/13/19 6385   Discharge Assessment   Assessment Type Discharge Planning Assessment     Attempted discharge planning assessment, but patient began crying, moaning and yelling that she didn't want to be a burden, she didn't want to talk to me to get out of the room.  She stated that she is getting "the run around about disability", and she gets no help and that is why she is here.  No information received. Case Management will continue to follow and reassess at later time.  "

## 2019-12-13 NOTE — ASSESSMENT & PLAN NOTE
12/13/2019:  1.  Admit to intensive care unit  2.  Monitor vital signs by ICU protocol.  IV fluids at 100 cc/hour  Tylenol level in 4 hr and then in a.m..  Repeat labs in the a.m. to include CBC and CMP

## 2019-12-13 NOTE — HPI
Patient is a 39-year-old who presented to the emergency department complaining of overdose of Tylenol p.m. and gabapentin.  Patient also has a history of drug use and has a history of previous suicidal attempt.  She states unequivocally that this was a suicide attempt.  States that she had a  recently  and she was made him for 17 years and just wants to go with him.  Patient has past medical history of anxiety/depression, osteoarthritis and Parkinson's disease. Patient is on carbidopa levodopa.

## 2019-12-13 NOTE — ED NOTES
I called [place] poison control and spoke to Marques. I told him the pt reports taking 10 actaminopen PM 500mg/benadryl 25mg and 7 500mg gabapentin tablets (although she reports having a prescription for 300mg tablets). He states that her doses are below toxicity range and that we should offer supportive care and treat symptoms.

## 2019-12-13 NOTE — H&P
[facility] - ICU  Hospital Medicine  History & Physical    Patient Name: Roya Booker  MRN: [id number]  Admission Date: 12/13/2019  Attending Physician: Warren Avalos MD   Primary Care Provider: Chariyt Allen MD         Patient information was obtained from patient and ER records.     Subjective:     Principal Problem:Intentional drug overdose    Chief Complaint:   Chief Complaint   Patient presents with    Suicidal     attempt by ingestion        HPI: No notes on file    No new subjective & objective note has been filed under this hospital service since the last note was generated.    Assessment/Plan:     * Intentional drug overdose  12/13/2019:  1.  Admit to intensive care unit  2.  Monitor vital signs by ICU protocol.  IV fluids at 100 cc/hour  Tylenol level in 4 hr and then in a.m..  Repeat labs in the a.m. to include CBC and CMP      Suicidal ideation  Will seek treatment for this patient when medically stable.        VTE Risk Mitigation (From admission, onward)         Ordered     IP VTE LOW RISK PATIENT  Once      12/13/19 1630                   Warren Avalos MD  Department of Hospital Medicine   [facility] - ICU

## 2019-12-13 NOTE — ED NOTES
"Pt attempting to leave, states "I just want to go home, you don't have my permission to treat me, I'm leaving!", ERP called to bedside.  "

## 2019-12-13 NOTE — ED TRIAGE NOTES
"Pt reports taking 10 tablets of tylenol PM 500mg/ benadryl 25mg this am and also same dose last night in a suicide attempt, EMS report patient text her mother goodbye before taking meds, pt states "please just let me die, I don't want to be here anymore without Raul, my hearts too big for this world"  "

## 2019-12-13 NOTE — ED PROVIDER NOTES
Encounter Date: 12/13/2019       History     Chief Complaint   Patient presents with    Suicidal     attempt by ingestion     39-year-old female presents per EMS in a lethargic state after apparently taking 10 Tylenol p.m. (500 mg acetamenophen, 25 mg benadryl) this morning and a suicide attempt she called her mother apparently afterwards and reported her behavior and her desire to kill herself her mother called 911 EMS, it is possible the patient took Tylenol p.m. last night also, patient is not much use as a historian as her sensorium is fluctuating however she did mumble just let me go I do not Wanna live anymore, patient was found in her poorly kempt trailer with her service dog standing over her in bed        Review of patient's allergies indicates:  No Known Allergies  Past Medical History:   Diagnosis Date    Anxiety     Depression     Migraine headache     Morbid obesity     Renal disorder     kidney stone     Past Surgical History:   Procedure Laterality Date    CHOLECYSTECTOMY      FRACTURE SURGERY  1984    right lower leg reconstruction surgery s/p trauma injury    TUBAL LIGATION       Family History   Problem Relation Age of Onset    Heart disease Mother      Social History     Tobacco Use    Smoking status: Current Every Day Smoker   Substance Use Topics    Alcohol use: No    Drug use: Never     Review of Systems   Unable to perform ROS: Mental status change       Physical Exam     Initial Vitals   BP Pulse Resp Temp SpO2   12/13/19 1308 12/13/19 1308 12/13/19 1308 12/13/19 1308 12/13/19 1307   (!) 140/88 72 18 98 °F (36.7 °C) 100 %      MAP       --                Physical Exam    Nursing note and vitals reviewed.  Constitutional: She is not diaphoretic. No distress.   Very obese   HENT:   Head: Normocephalic and atraumatic.   Right Ear: External ear normal.   Left Ear: External ear normal.   Eyes: Pupils are equal, round, and reactive to light. Right eye exhibits no discharge. Left eye  exhibits no discharge.   Neck: No tracheal deviation present. No JVD present.   Cardiovascular: Exam reveals no friction rub.    No murmur heard.  Pulmonary/Chest: No stridor. No respiratory distress. She has no wheezes. She has no rales.   Abdominal: Bowel sounds are normal. She exhibits no distension.   Musculoskeletal: Normal range of motion.   Healed surgical scar to the right leg   Neurological: She is alert.   Skin: Skin is warm.   Psychiatric: She has a normal mood and affect.         ED Course   Procedures  Labs Reviewed   CBC W/ AUTO DIFFERENTIAL   COMPREHENSIVE METABOLIC PANEL   ACETAMINOPHEN LEVEL   DRUG SCREEN PANEL, URINE EMERGENCY   SALICYLATE LEVEL   ALCOHOL,MEDICAL (ETHANOL)   URINALYSIS, REFLEX TO URINE CULTURE   PREGNANCY TEST, URINE RAPID     EKG Readings: (Independently Interpreted)   Initial Reading: No STEMI. Rhythm: Normal Sinus Rhythm. Heart Rate: 76. Ectopy: No Ectopy. Conduction: Normal. ST Segments: Normal ST Segments. T Waves: Normal.       Imaging Results    None          Medical Decision Making:   ED Management:  Patient improved here in the ED, her alertness would fluctuate and she was combative and trying to leave and was put in four point restraints                   ED Course as of Dec 13 1447   Fri Dec 13, 2019   1446 Patient seems calmer although at times tearful she is not combative or trying to get out of bed, case is discussed with Dr. Avalos, patient will have restraints sequentially removed    [WK]      ED Course User Index  [WK] Sai Shahid MD                Clinical Impression:       ICD-10-CM ICD-9-CM   1. Intentional drug overdose, initial encounter T50.902A 977.9     E950.5   2. Weakness R53.1 780.79                             Sai Shahid MD  12/13/19 3853

## 2019-12-14 LAB
ALBUMIN SERPL BCP-MCNC: 3.5 G/DL (ref 3.5–5.2)
ALP SERPL-CCNC: 50 U/L (ref 55–135)
ALT SERPL W/O P-5'-P-CCNC: 15 U/L (ref 10–44)
ANION GAP SERPL CALC-SCNC: 3 MMOL/L (ref 8–16)
APAP SERPL-MCNC: <10 UG/ML (ref 10–20)
AST SERPL-CCNC: 14 U/L (ref 10–40)
BASOPHILS # BLD AUTO: 0.04 K/UL (ref 0–0.2)
BASOPHILS NFR BLD: 0.5 % (ref 0–1.9)
BILIRUB SERPL-MCNC: 0.6 MG/DL (ref 0.1–1)
BUN SERPL-MCNC: 7 MG/DL (ref 6–20)
CALCIUM SERPL-MCNC: 8.1 MG/DL (ref 8.7–10.5)
CHLORIDE SERPL-SCNC: 110 MMOL/L (ref 95–110)
CO2 SERPL-SCNC: 26 MMOL/L (ref 23–29)
CREAT SERPL-MCNC: 0.7 MG/DL (ref 0.5–1.4)
DIFFERENTIAL METHOD: NORMAL
EOSINOPHIL # BLD AUTO: 0.1 K/UL (ref 0–0.5)
EOSINOPHIL NFR BLD: 1.8 % (ref 0–8)
ERYTHROCYTE [DISTWIDTH] IN BLOOD BY AUTOMATED COUNT: 12 % (ref 11.5–14.5)
EST. GFR  (AFRICAN AMERICAN): >60 ML/MIN/1.73 M^2
EST. GFR  (NON AFRICAN AMERICAN): >60 ML/MIN/1.73 M^2
GLUCOSE SERPL-MCNC: 93 MG/DL (ref 70–110)
HCT VFR BLD AUTO: 37.6 % (ref 37–48.5)
HGB BLD-MCNC: 12.2 G/DL (ref 12–16)
IMM GRANULOCYTES # BLD AUTO: 0.03 K/UL (ref 0–0.04)
IMM GRANULOCYTES NFR BLD AUTO: 0.4 % (ref 0–0.5)
LYMPHOCYTES # BLD AUTO: 1.5 K/UL (ref 1–4.8)
LYMPHOCYTES NFR BLD: 20.1 % (ref 18–48)
MCH RBC QN AUTO: 30.5 PG (ref 27–31)
MCHC RBC AUTO-ENTMCNC: 32.4 G/DL (ref 32–36)
MCV RBC AUTO: 94 FL (ref 82–98)
MONOCYTES # BLD AUTO: 0.5 K/UL (ref 0.3–1)
MONOCYTES NFR BLD: 7.1 % (ref 4–15)
NEUTROPHILS # BLD AUTO: 5.4 K/UL (ref 1.8–7.7)
NEUTROPHILS NFR BLD: 70.1 % (ref 38–73)
NRBC BLD-RTO: 0 /100 WBC
PLATELET # BLD AUTO: 263 K/UL (ref 150–350)
PMV BLD AUTO: 9.9 FL (ref 9.2–12.9)
POTASSIUM SERPL-SCNC: 3.8 MMOL/L (ref 3.5–5.1)
PROT SERPL-MCNC: 6.6 G/DL (ref 6–8.4)
RBC # BLD AUTO: 4 M/UL (ref 4–5.4)
SODIUM SERPL-SCNC: 139 MMOL/L (ref 136–145)
WBC # BLD AUTO: 7.63 K/UL (ref 3.9–12.7)

## 2019-12-14 PROCEDURE — 80053 COMPREHEN METABOLIC PANEL: CPT

## 2019-12-14 PROCEDURE — 25000003 PHARM REV CODE 250: Performed by: INTERNAL MEDICINE

## 2019-12-14 PROCEDURE — 99226 PR SUBSEQUENT OBSERVATION CARE,LEVEL III: ICD-10-PCS | Mod: ,,, | Performed by: INTERNAL MEDICINE

## 2019-12-14 PROCEDURE — 36415 COLL VENOUS BLD VENIPUNCTURE: CPT

## 2019-12-14 PROCEDURE — 27000221 HC OXYGEN, UP TO 24 HOURS

## 2019-12-14 PROCEDURE — 96375 TX/PRO/DX INJ NEW DRUG ADDON: CPT

## 2019-12-14 PROCEDURE — G0378 HOSPITAL OBSERVATION PER HR: HCPCS

## 2019-12-14 PROCEDURE — 63600175 PHARM REV CODE 636 W HCPCS: Performed by: INTERNAL MEDICINE

## 2019-12-14 PROCEDURE — 96376 TX/PRO/DX INJ SAME DRUG ADON: CPT

## 2019-12-14 PROCEDURE — C9113 INJ PANTOPRAZOLE SODIUM, VIA: HCPCS | Performed by: INTERNAL MEDICINE

## 2019-12-14 PROCEDURE — 99226 PR SUBSEQUENT OBSERVATION CARE,LEVEL III: CPT | Mod: ,,, | Performed by: INTERNAL MEDICINE

## 2019-12-14 PROCEDURE — 96361 HYDRATE IV INFUSION ADD-ON: CPT

## 2019-12-14 PROCEDURE — 85025 COMPLETE CBC W/AUTO DIFF WBC: CPT

## 2019-12-14 PROCEDURE — 80329 ANALGESICS NON-OPIOID 1 OR 2: CPT

## 2019-12-14 RX ORDER — IBUPROFEN 200 MG
1 TABLET ORAL DAILY
Status: DISCONTINUED | OUTPATIENT
Start: 2019-12-15 | End: 2019-12-16 | Stop reason: HOSPADM

## 2019-12-14 RX ADMIN — LORAZEPAM 1 MG: 2 INJECTION INTRAMUSCULAR; INTRAVENOUS at 08:12

## 2019-12-14 RX ADMIN — PROPRANOLOL HYDROCHLORIDE 20 MG: 10 TABLET ORAL at 08:12

## 2019-12-14 RX ADMIN — SENNOSIDES AND DOCUSATE SODIUM 1 TABLET: 8.6; 5 TABLET ORAL at 08:12

## 2019-12-14 RX ADMIN — TOPIRAMATE 25 MG: 25 TABLET, FILM COATED ORAL at 08:12

## 2019-12-14 RX ADMIN — GABAPENTIN 300 MG: 300 CAPSULE ORAL at 08:12

## 2019-12-14 RX ADMIN — SODIUM CHLORIDE: 0.9 INJECTION, SOLUTION INTRAVENOUS at 10:12

## 2019-12-14 RX ADMIN — LORAZEPAM 1 MG: 2 INJECTION INTRAMUSCULAR; INTRAVENOUS at 04:12

## 2019-12-14 RX ADMIN — FUROSEMIDE 40 MG: 20 TABLET ORAL at 08:12

## 2019-12-14 RX ADMIN — QUETIAPINE FUMARATE 100 MG: 25 TABLET ORAL at 08:12

## 2019-12-14 RX ADMIN — LORAZEPAM 1 MG: 2 INJECTION INTRAMUSCULAR; INTRAVENOUS at 01:12

## 2019-12-14 RX ADMIN — PANTOPRAZOLE SODIUM 40 MG: 40 INJECTION, POWDER, LYOPHILIZED, FOR SOLUTION INTRAVENOUS at 08:12

## 2019-12-14 RX ADMIN — GABAPENTIN 300 MG: 300 CAPSULE ORAL at 03:12

## 2019-12-14 RX ADMIN — DULOXETINE 60 MG: 30 CAPSULE, DELAYED RELEASE ORAL at 08:12

## 2019-12-14 NOTE — PLAN OF CARE
"One on one sitter. Suicidal precautions in place. Does state,"I wanted to leave this world."   1 and 1 sitter with pt. Pt observation- eyes closed. Res even and unlabored. Moans occasionally.   2000- eyes closed. Res even and unlabored.  0014-Vianey is on her mensterul cycle. Cleaned pt and applied peripad. Awake. Oriented x 4. Eating ice cream and drinking sprite.   0023-lying on right side. Eyes closed. Res even and unlabored. All alarms in place.  EOSS:Roya has slept through the night. Wakes up occasionally. Oriented x 4. Cycle is ongoing. Changed pads. Butler cath in use. Clear yellow urine. Oxygen sats are 95-98 on room air. She prefers to use Oxygen. States,"It makes me feel better."  "

## 2019-12-14 NOTE — NURSING
No acute events overnight, pt. Remains one on one with sitter at bedside, all safety precautions in place at this time, pt. Did have suicidal ideations at one time, pt. Remains calm at this time, appears to be resting comfortably with eyes closed. Vitals wnl. Sitter at bedside.

## 2019-12-14 NOTE — ASSESSMENT & PLAN NOTE
12/13/2019:  1.  Admit to intensive care unit  2.  Monitor vital signs by ICU protocol.  IV fluids at 100 cc/hour  Tylenol level in 4 hr and then in a.m..  Repeat labs in the a.m. to include CBC and CMP      12/14/2019  Continue same care overnight.  Advance diet as tolerated  Begin her antidepressant medications back.

## 2019-12-14 NOTE — ASSESSMENT & PLAN NOTE
Will seek treatment for this patient when medically stable.    12/14/2019:  1.  Consult case management for possible placement on Monday  2.  Begin antidepressants back to her drug regimen.

## 2019-12-14 NOTE — SUBJECTIVE & OBJECTIVE
Interval History:     Review of Systems   Constitutional: Negative for activity change, appetite change, fatigue and fever.   HENT: Negative for congestion, ear discharge, mouth sores, nosebleeds, rhinorrhea, sinus pressure, sinus pain and tinnitus.    Eyes: Negative.  Negative for pain, redness and itching.   Respiratory: Negative for apnea, cough, choking, chest tightness, shortness of breath, wheezing and stridor.    Cardiovascular: Negative for chest pain, palpitations and leg swelling.   Gastrointestinal: Negative for abdominal distention, abdominal pain, anal bleeding, blood in stool, constipation and diarrhea.   Endocrine: Negative.    Genitourinary: Negative for difficulty urinating, flank pain, frequency and urgency.   Musculoskeletal: Negative for arthralgias, back pain, gait problem and myalgias.   Skin: Negative for color change and pallor.   Allergic/Immunologic: Negative.    Neurological: Negative for dizziness, facial asymmetry, weakness, light-headedness and headaches.   Hematological: Negative for adenopathy. Does not bruise/bleed easily.   Psychiatric/Behavioral: The patient is nervous/anxious.      Objective:     Vital Signs (Most Recent):  Temp: 98.1 °F (36.7 °C) (12/14/19 1119)  Pulse: 75 (12/14/19 1119)  Resp: 20 (12/14/19 1119)  BP: 100/72 (12/14/19 1119)  SpO2: 98 % (12/14/19 1100) Vital Signs (24h Range):  Temp:  [97.8 °F (36.6 °C)-98.3 °F (36.8 °C)] 98.1 °F (36.7 °C)  Pulse:  [68-92] 75  Resp:  [0-29] 20  SpO2:  [95 %-100 %] 98 %  BP: (100-124)/(44-80) 100/72     Weight: 130 kg (286 lb 9.6 oz)  Body mass index is 49.19 kg/m².    Intake/Output Summary (Last 24 hours) at 12/14/2019 1325  Last data filed at 12/14/2019 1200  Gross per 24 hour   Intake 2356.66 ml   Output 2500 ml   Net -143.34 ml      Physical Exam   Constitutional: She is oriented to person, place, and time. She appears well-developed and well-nourished.   HENT:   Head: Normocephalic and atraumatic.   Eyes: Pupils are equal,  round, and reactive to light. EOM are normal.   Neck: Normal range of motion. Neck supple. No tracheal deviation present. No thyromegaly present.   Cardiovascular: Normal rate, regular rhythm and normal heart sounds.   Pulmonary/Chest: Effort normal and breath sounds normal.   Abdominal: Soft. Bowel sounds are normal. She exhibits no distension. There is no tenderness. There is no rebound and no guarding.   Musculoskeletal: Normal range of motion.   Lymphadenopathy:     She has no cervical adenopathy.   Neurological: She is alert and oriented to person, place, and time.   Skin: Skin is warm and dry. Capillary refill takes less than 2 seconds.   Psychiatric: She has a normal mood and affect. Her behavior is normal. Judgment and thought content normal.   Nursing note and vitals reviewed.      Significant Labs:   Recent Lab Results       12/14/19  0844   12/13/19  1655   12/13/19  1343   12/13/19  1342        Benzodiazepines     Negative       Phencyclidine     Negative       Acetaminophen (Tylenol), Serum   <10.0  Comment:  Toxic Levels:  Adults (4 hr post-ingestion).........>150 ug/mL  Adults (12 hr post-ingestion)........>40 ug/mL  Peds (2 hr post-ingestion, liquid)...>225 ug/mL     <10.0  Comment:  Toxic Levels:  Adults (4 hr post-ingestion).........>150 ug/mL  Adults (12 hr post-ingestion)........>40 ug/mL  Peds (2 hr post-ingestion, liquid)...>225 ug/mL       Albumin 3.5     3.4     Alcohol, Medical, Serum       <5     Alkaline Phosphatase 50     52     ALT 15     16     Amphetamine Screen, Ur     Presumptive Positive       Anion Gap 3     7     Appearance, UA     Clear       AST 14     27     Bacteria, UA     Many       Barbiturate Screen, Ur     Negative       Baso # 0.04     0.05     Basophil% 0.5     0.8     Bilirubin (UA)     Negative       BILIRUBIN TOTAL 0.6  Comment:  For infants and newborns, interpretation of results should be based  on gestational age, weight and in agreement with  clinical  observations.  Premature Infant recommended reference ranges:  Up to 24 hours.............<8.0 mg/dL  Up to 48 hours............<12.0 mg/dL  3-5 days..................<15.0 mg/dL  6-29 days.................<15.0 mg/dL       1.5  Comment:  For infants and newborns, interpretation of results should be based  on gestational age, weight and in agreement with clinical  observations.  Premature Infant recommended reference ranges:  Up to 24 hours.............<8.0 mg/dL  Up to 48 hours............<12.0 mg/dL  3-5 days..................<15.0 mg/dL  6-29 days.................<15.0 mg/dL       BUN, Bld 7     8     Calcium 8.1     7.8     Chloride 110     109     CO2 26     20     Cocaine (Metab.)     Negative       Color, UA     Yellow       Creatinine 0.7     0.6     Creatinine, Random Ur     211.9  Comment:  The random urine reference ranges provided were established   for 24 hour urine collections.  No reference ranges exist for  random urine specimens.  Correlate clinically.         Differential Method Automated     Automated     eGFR if  >60.0     >60.0     eGFR if non  >60.0  Comment:  Calculation used to obtain the estimated glomerular filtration  rate (eGFR) is the CKD-EPI equation.        >60.0  Comment:  Calculation used to obtain the estimated glomerular filtration  rate (eGFR) is the CKD-EPI equation.        Eos # 0.1     0.2     Eosinophil% 1.8     2.9     Large/Giant Platelets       Present     Glucose 93     87     Glucose, UA     Negative       Gran # (ANC) 5.4     3.9     Gran% 70.1     60.1     Hematocrit 37.6     38.4     Hemoglobin 12.2     12.7     Immature Grans (Abs) 0.03  Comment:  Mild elevation in immature granulocytes is non specific and   can be seen in a variety of conditions including stress response,   acute inflammation, trauma and pregnancy. Correlation with other   laboratory and clinical findings is essential.       0.06  Comment:  Mild elevation in  immature granulocytes is non specific and   can be seen in a variety of conditions including stress response,   acute inflammation, trauma and pregnancy. Correlation with other   laboratory and clinical findings is essential.       Immature Granulocytes 0.4     0.9     Ketones, UA     Negative       Leukocytes, UA     Negative       Lymph # 1.5     1.8     Lymph% 20.1     27.3     MCH 30.5     30.6     MCHC 32.4     33.1     MCV 94     93     Microscopic Comment     SEE COMMENT  Comment:  Other formed elements not mentioned in the report are not   present in the microscopic examination.          Mono # 0.5     0.5     Mono% 7.1     8.0     MPV 9.9     10.3     NITRITE UA     Positive       nRBC 0     0     Occult Blood UA     Trace       Opiate Scrn, Ur     Negative       pH, UA     7.0       Platelet Estimate       Appears normal     Platelets 263     244     Potassium 3.8     4.6     Preg Test, Ur     Negative       PROTEIN TOTAL 6.6     6.7     Protein, UA     Negative  Comment:  Recommend a 24 hour urine protein or a urine   protein/creatinine ratio if globulin induced proteinuria is  clinically suspected.         RBC 4.00     4.15     RBC, UA     15       RDW 12.0     12.2     Salicylate Lvl       <4.0  Comment:  Toxic:  30.0 - 70.0 mg/dl  Lethal: >70.0 mg/dl       Sodium 139     136     Specific Gravity, UA     1.020       Specimen UA     Urine, Catheterized       Marijuana (THC) Metabolite     Presumptive Positive       Toxicology Information     SEE COMMENT  Comment:  This screen includes the following classes of drugs at the   listed cut-off:  Benzodiazepines                  200 ng/ml  Cocaine metabolite               300 ng/ml  Opiates                         2000 ng/ml  Barbiturates                     200 ng/ml  Amphetamines                    1000 ng/ml  Marijuana metabs (THC)            50 ng/ml  Phencyclidine (PCP)               25 ng/ml  High concentrations of Methylenedioxymethamphetamine (MDMA  aka  Ectasy) and other structurally similar compounds may cross-   react with the Amphetamine/Methamphetamine screening   immunoassay giving a false positive result.  Note: This exception list includes only more common   interferants in toxicology screen testing.  Because of many   cross-reactantspositive results on toxicology drug screens   should be confirmed whenever results do not correlate with   clinical presentation.  This report is intended for use in clinical monitoring and  management of patients. It is not intended for use in   employment related drug testing.  Because of any cross-reactants, positive results on toxicology  drug screens should be confirmed whenever results do not  correlate with clinical presentation.  Presumptive positive results are unconfirmed and may be used   only for medical purposes.         UROBILINOGEN UA     Negative       WBC, UA     8       WBC 7.63     6.52         All pertinent labs within the past 24 hours have been reviewed.    Significant Imaging: I have reviewed and interpreted all pertinent imaging results/findings within the past 24 hours.

## 2019-12-14 NOTE — NURSING
No acute changes at this time, pt. Remains calm. Appears to be resting in bed comfortably, sitter to remain at bedside. Vitals wnl.

## 2019-12-14 NOTE — PROGRESS NOTES
[facility] - ICU Hospital Medicine  Progress Note    Patient Name: Roya Booker  MRN: [id number]  Patient Class: OP- Observation   Admission Date: 2019  Length of Stay: 0 days  Attending Physician: Warren Avalos MD  Primary Care Provider: Charity Allen MD        Subjective:     Principal Problem:Intentional drug overdose        HPI:  Patient is a 39-year-old who presented to the emergency department complaining of overdose of Tylenol p.m. and gabapentin.  Patient also has a history of drug use and has a history of previous suicidal attempt.  She states unequivocally that this was a suicide attempt.  States that she had a  recently  and she was made him for 17 years and just wants to go with him.  Patient has past medical history of anxiety/depression, osteoarthritis and Parkinson's disease. Patient is on carbidopa levodopa.    Overview/Hospital Course:  2019:  Patient has been stable overnight without complaints of pain or somatic complaints.  Patient states chest a broken heart   Labs are normal of Tylenol levels pending liver function tests are normal\signs are stable with normal blood pressure temperature 98.1° pulse 75 respirations even and nonlabored.    Interval History:     Review of Systems   Constitutional: Negative for activity change, appetite change, fatigue and fever.   HENT: Negative for congestion, ear discharge, mouth sores, nosebleeds, rhinorrhea, sinus pressure, sinus pain and tinnitus.    Eyes: Negative.  Negative for pain, redness and itching.   Respiratory: Negative for apnea, cough, choking, chest tightness, shortness of breath, wheezing and stridor.    Cardiovascular: Negative for chest pain, palpitations and leg swelling.   Gastrointestinal: Negative for abdominal distention, abdominal pain, anal bleeding, blood in stool, constipation and diarrhea.   Endocrine: Negative.    Genitourinary: Negative for difficulty urinating,  flank pain, frequency and urgency.   Musculoskeletal: Negative for arthralgias, back pain, gait problem and myalgias.   Skin: Negative for color change and pallor.   Allergic/Immunologic: Negative.    Neurological: Negative for dizziness, facial asymmetry, weakness, light-headedness and headaches.   Hematological: Negative for adenopathy. Does not bruise/bleed easily.   Psychiatric/Behavioral: The patient is nervous/anxious.      Objective:     Vital Signs (Most Recent):  Temp: 98.1 °F (36.7 °C) (12/14/19 1119)  Pulse: 75 (12/14/19 1119)  Resp: 20 (12/14/19 1119)  BP: 100/72 (12/14/19 1119)  SpO2: 98 % (12/14/19 1100) Vital Signs (24h Range):  Temp:  [97.8 °F (36.6 °C)-98.3 °F (36.8 °C)] 98.1 °F (36.7 °C)  Pulse:  [68-92] 75  Resp:  [0-29] 20  SpO2:  [95 %-100 %] 98 %  BP: (100-124)/(44-80) 100/72     Weight: 130 kg (286 lb 9.6 oz)  Body mass index is 49.19 kg/m².    Intake/Output Summary (Last 24 hours) at 12/14/2019 1325  Last data filed at 12/14/2019 1200  Gross per 24 hour   Intake 2356.66 ml   Output 2500 ml   Net -143.34 ml      Physical Exam   Constitutional: She is oriented to person, place, and time. She appears well-developed and well-nourished.   HENT:   Head: Normocephalic and atraumatic.   Eyes: Pupils are equal, round, and reactive to light. EOM are normal.   Neck: Normal range of motion. Neck supple. No tracheal deviation present. No thyromegaly present.   Cardiovascular: Normal rate, regular rhythm and normal heart sounds.   Pulmonary/Chest: Effort normal and breath sounds normal.   Abdominal: Soft. Bowel sounds are normal. She exhibits no distension. There is no tenderness. There is no rebound and no guarding.   Musculoskeletal: Normal range of motion.   Lymphadenopathy:     She has no cervical adenopathy.   Neurological: She is alert and oriented to person, place, and time.   Skin: Skin is warm and dry. Capillary refill takes less than 2 seconds.   Psychiatric: She has a normal mood and affect. Her  behavior is normal. Judgment and thought content normal.   Nursing note and vitals reviewed.      Significant Labs:   Recent Lab Results       12/14/19  0844   12/13/19  1655   12/13/19  1343   12/13/19  1342        Benzodiazepines     Negative       Phencyclidine     Negative       Acetaminophen (Tylenol), Serum   <10.0  Comment:  Toxic Levels:  Adults (4 hr post-ingestion).........>150 ug/mL  Adults (12 hr post-ingestion)........>40 ug/mL  Peds (2 hr post-ingestion, liquid)...>225 ug/mL     <10.0  Comment:  Toxic Levels:  Adults (4 hr post-ingestion).........>150 ug/mL  Adults (12 hr post-ingestion)........>40 ug/mL  Peds (2 hr post-ingestion, liquid)...>225 ug/mL       Albumin 3.5     3.4     Alcohol, Medical, Serum       <5     Alkaline Phosphatase 50     52     ALT 15     16     Amphetamine Screen, Ur     Presumptive Positive       Anion Gap 3     7     Appearance, UA     Clear       AST 14     27     Bacteria, UA     Many       Barbiturate Screen, Ur     Negative       Baso # 0.04     0.05     Basophil% 0.5     0.8     Bilirubin (UA)     Negative       BILIRUBIN TOTAL 0.6  Comment:  For infants and newborns, interpretation of results should be based  on gestational age, weight and in agreement with clinical  observations.  Premature Infant recommended reference ranges:  Up to 24 hours.............<8.0 mg/dL  Up to 48 hours............<12.0 mg/dL  3-5 days..................<15.0 mg/dL  6-29 days.................<15.0 mg/dL       1.5  Comment:  For infants and newborns, interpretation of results should be based  on gestational age, weight and in agreement with clinical  observations.  Premature Infant recommended reference ranges:  Up to 24 hours.............<8.0 mg/dL  Up to 48 hours............<12.0 mg/dL  3-5 days..................<15.0 mg/dL  6-29 days.................<15.0 mg/dL       BUN, Bld 7     8     Calcium 8.1     7.8     Chloride 110     109     CO2 26     20     Cocaine (Metab.)     Negative        Color, UA     Yellow       Creatinine 0.7     0.6     Creatinine, Random Ur     211.9  Comment:  The random urine reference ranges provided were established   for 24 hour urine collections.  No reference ranges exist for  random urine specimens.  Correlate clinically.         Differential Method Automated     Automated     eGFR if  >60.0     >60.0     eGFR if non  >60.0  Comment:  Calculation used to obtain the estimated glomerular filtration  rate (eGFR) is the CKD-EPI equation.        >60.0  Comment:  Calculation used to obtain the estimated glomerular filtration  rate (eGFR) is the CKD-EPI equation.        Eos # 0.1     0.2     Eosinophil% 1.8     2.9     Large/Giant Platelets       Present     Glucose 93     87     Glucose, UA     Negative       Gran # (ANC) 5.4     3.9     Gran% 70.1     60.1     Hematocrit 37.6     38.4     Hemoglobin 12.2     12.7     Immature Grans (Abs) 0.03  Comment:  Mild elevation in immature granulocytes is non specific and   can be seen in a variety of conditions including stress response,   acute inflammation, trauma and pregnancy. Correlation with other   laboratory and clinical findings is essential.       0.06  Comment:  Mild elevation in immature granulocytes is non specific and   can be seen in a variety of conditions including stress response,   acute inflammation, trauma and pregnancy. Correlation with other   laboratory and clinical findings is essential.       Immature Granulocytes 0.4     0.9     Ketones, UA     Negative       Leukocytes, UA     Negative       Lymph # 1.5     1.8     Lymph% 20.1     27.3     MCH 30.5     30.6     MCHC 32.4     33.1     MCV 94     93     Microscopic Comment     SEE COMMENT  Comment:  Other formed elements not mentioned in the report are not   present in the microscopic examination.          Mono # 0.5     0.5     Mono% 7.1     8.0     MPV 9.9     10.3     NITRITE UA     Positive       nRBC 0     0     Occult  Blood UA     Trace       Opiate Scrn, Ur     Negative       pH, UA     7.0       Platelet Estimate       Appears normal     Platelets 263     244     Potassium 3.8     4.6     Preg Test, Ur     Negative       PROTEIN TOTAL 6.6     6.7     Protein, UA     Negative  Comment:  Recommend a 24 hour urine protein or a urine   protein/creatinine ratio if globulin induced proteinuria is  clinically suspected.         RBC 4.00     4.15     RBC, UA     15       RDW 12.0     12.2     Salicylate Lvl       <4.0  Comment:  Toxic:  30.0 - 70.0 mg/dl  Lethal: >70.0 mg/dl       Sodium 139     136     Specific Gravity, UA     1.020       Specimen UA     Urine, Catheterized       Marijuana (THC) Metabolite     Presumptive Positive       Toxicology Information     SEE COMMENT  Comment:  This screen includes the following classes of drugs at the   listed cut-off:  Benzodiazepines                  200 ng/ml  Cocaine metabolite               300 ng/ml  Opiates                         2000 ng/ml  Barbiturates                     200 ng/ml  Amphetamines                    1000 ng/ml  Marijuana metabs (THC)            50 ng/ml  Phencyclidine (PCP)               25 ng/ml  High concentrations of Methylenedioxymethamphetamine (MDMA aka  Ectasy) and other structurally similar compounds may cross-   react with the Amphetamine/Methamphetamine screening   immunoassay giving a false positive result.  Note: This exception list includes only more common   interferants in toxicology screen testing.  Because of many   cross-reactantspositive results on toxicology drug screens   should be confirmed whenever results do not correlate with   clinical presentation.  This report is intended for use in clinical monitoring and  management of patients. It is not intended for use in   employment related drug testing.  Because of any cross-reactants, positive results on toxicology  drug screens should be confirmed whenever results do not  correlate with clinical  presentation.  Presumptive positive results are unconfirmed and may be used   only for medical purposes.         UROBILINOGEN UA     Negative       WBC, UA     8       WBC 7.63     6.52         All pertinent labs within the past 24 hours have been reviewed.    Significant Imaging: I have reviewed and interpreted all pertinent imaging results/findings within the past 24 hours.      Assessment/Plan:      * Intentional drug overdose  12/13/2019:  1.  Admit to intensive care unit  2.  Monitor vital signs by ICU protocol.  IV fluids at 100 cc/hour  Tylenol level in 4 hr and then in a.m..  Repeat labs in the a.m. to include CBC and CMP      12/14/2019  Continue same care overnight.  Advance diet as tolerated  Begin her antidepressant medications back.    Suicidal ideation  Will seek treatment for this patient when medically stable.    12/14/2019:  1.  Consult case management for possible placement on Monday  2.  Begin antidepressants back to her drug regimen.      VTE Risk Mitigation (From admission, onward)         Ordered     IP VTE LOW RISK PATIENT  Once      12/13/19 1630                      Warren Avalos MD  Department of Hospital Medicine   [facility]

## 2019-12-14 NOTE — HOSPITAL COURSE
12/14/2019:  Patient has been stable overnight without complaints of pain or somatic complaints.  Patient states chest a broken heart   Labs are normal of Tylenol levels pending liver function tests are normal\  Vital signs are stable with normal blood pressure temperature 98.1° pulse 75 respirations even and nonlabored.    12/15/2019:  Patient has been stable overnight and still having very this for of affect.  Labs are normal  Continue current treatment and monitoring in the ICU for suicidal ideation  Attempt for placement as soon as possible by case management    12/16/2019:  Patient is medically stable having no somatic complaints.  Patient remains depressed and sad with flat affect  Labs were reviewed and normal  Patient was evaluated this morning by mental health and they overwhelmingly recommended that this patient be placed in an inpatient facility.  Patient is a significant risk for repeat attempt of suicide.    Patient has been accepted to [facility], [facility].  Patient will be transferred and will be transported tonight.

## 2019-12-14 NOTE — PLAN OF CARE
Plan of care discussed with pt; voiced understanding; 72hr hold and suicide precautions reinforced with pt. Will continue to monitor/educate/reinforce.

## 2019-12-14 NOTE — NURSING
Received report from Quinten LITTLE. Eyes closed. Res even and unlabored. Wakens when spoke to. Oxygen applied for comfort. All alarms in place. Assessment complete.

## 2019-12-15 PROCEDURE — 99226 PR SUBSEQUENT OBSERVATION CARE,LEVEL III: CPT | Mod: ,,, | Performed by: INTERNAL MEDICINE

## 2019-12-15 PROCEDURE — 63600175 PHARM REV CODE 636 W HCPCS: Performed by: INTERNAL MEDICINE

## 2019-12-15 PROCEDURE — G0378 HOSPITAL OBSERVATION PER HR: HCPCS

## 2019-12-15 PROCEDURE — 25000003 PHARM REV CODE 250: Performed by: INTERNAL MEDICINE

## 2019-12-15 PROCEDURE — 99226 PR SUBSEQUENT OBSERVATION CARE,LEVEL III: ICD-10-PCS | Mod: ,,, | Performed by: INTERNAL MEDICINE

## 2019-12-15 PROCEDURE — 96361 HYDRATE IV INFUSION ADD-ON: CPT

## 2019-12-15 PROCEDURE — 94760 N-INVAS EAR/PLS OXIMETRY 1: CPT

## 2019-12-15 PROCEDURE — 96376 TX/PRO/DX INJ SAME DRUG ADON: CPT

## 2019-12-15 PROCEDURE — 27000221 HC OXYGEN, UP TO 24 HOURS

## 2019-12-15 PROCEDURE — S4991 NICOTINE PATCH NONLEGEND: HCPCS | Performed by: INTERNAL MEDICINE

## 2019-12-15 RX ORDER — BUSPIRONE HYDROCHLORIDE 5 MG/1
10 TABLET ORAL 2 TIMES DAILY
Status: DISCONTINUED | OUTPATIENT
Start: 2019-12-15 | End: 2019-12-16 | Stop reason: HOSPADM

## 2019-12-15 RX ORDER — DULOXETIN HYDROCHLORIDE 30 MG/1
60 CAPSULE, DELAYED RELEASE ORAL DAILY
Status: DISCONTINUED | OUTPATIENT
Start: 2019-12-15 | End: 2019-12-15

## 2019-12-15 RX ADMIN — QUETIAPINE FUMARATE 100 MG: 25 TABLET ORAL at 09:12

## 2019-12-15 RX ADMIN — NICOTINE 1 PATCH: 21 PATCH, EXTENDED RELEASE TRANSDERMAL at 08:12

## 2019-12-15 RX ADMIN — TOPIRAMATE 25 MG: 25 TABLET, FILM COATED ORAL at 09:12

## 2019-12-15 RX ADMIN — BUSPIRONE HYDROCHLORIDE 10 MG: 5 TABLET ORAL at 12:12

## 2019-12-15 RX ADMIN — GABAPENTIN 300 MG: 300 CAPSULE ORAL at 08:12

## 2019-12-15 RX ADMIN — BUSPIRONE HYDROCHLORIDE 10 MG: 5 TABLET ORAL at 09:12

## 2019-12-15 RX ADMIN — FUROSEMIDE 40 MG: 20 TABLET ORAL at 09:12

## 2019-12-15 RX ADMIN — FUROSEMIDE 40 MG: 20 TABLET ORAL at 08:12

## 2019-12-15 RX ADMIN — PROPRANOLOL HYDROCHLORIDE 20 MG: 10 TABLET ORAL at 09:12

## 2019-12-15 RX ADMIN — LORAZEPAM 1 MG: 2 INJECTION INTRAMUSCULAR; INTRAVENOUS at 07:12

## 2019-12-15 RX ADMIN — SENNOSIDES AND DOCUSATE SODIUM 1 TABLET: 8.6; 5 TABLET ORAL at 08:12

## 2019-12-15 RX ADMIN — SENNOSIDES AND DOCUSATE SODIUM 1 TABLET: 8.6; 5 TABLET ORAL at 09:12

## 2019-12-15 RX ADMIN — GABAPENTIN 300 MG: 300 CAPSULE ORAL at 02:12

## 2019-12-15 RX ADMIN — DULOXETINE 60 MG: 30 CAPSULE, DELAYED RELEASE ORAL at 08:12

## 2019-12-15 RX ADMIN — GABAPENTIN 300 MG: 300 CAPSULE ORAL at 09:12

## 2019-12-15 RX ADMIN — LORAZEPAM 1 MG: 2 INJECTION INTRAMUSCULAR; INTRAVENOUS at 02:12

## 2019-12-15 RX ADMIN — TOPIRAMATE 25 MG: 25 TABLET, FILM COATED ORAL at 08:12

## 2019-12-15 NOTE — PROGRESS NOTES
[facility] - ICU Hospital Medicine  Progress Note    Patient Name: Roya Booker  MRN: [id number]  Patient Class: OP- Observation   Admission Date: 2019  Length of Stay: 0 days  Attending Physician: Warren Avalos MD  Primary Care Provider: Charity Allen MD        Subjective:     Principal Problem:Intentional drug overdose        HPI:  Patient is a 39-year-old who presented to the emergency department complaining of overdose of Tylenol p.m. and gabapentin.  Patient also has a history of drug use and has a history of previous suicidal attempt.  She states unequivocally that this was a suicide attempt.  States that she had a  recently  and she was made him for 17 years and just wants to go with him.  Patient has past medical history of anxiety/depression, osteoarthritis and Parkinson's disease. Patient is on carbidopa levodopa.    Overview/Hospital Course:  2019:  Patient has been stable overnight without complaints of pain or somatic complaints.  Patient states chest a broken heart   Labs are normal of Tylenol levels pending liver function tests are normal\signs are stable with normal blood pressure temperature 98.1° pulse 75 respirations even and nonlabored.    12/15/2019:  Patient has been stable overnight and still having very this for of affect.  Labs are normal  Continue current treatment and monitoring in the ICU for suicidal ideation  Attempt for placement as soon as possible by case management    Interval History:     Review of Systems   Constitutional: Negative for activity change, appetite change, fatigue and fever.   HENT: Negative for congestion, ear discharge, mouth sores, nosebleeds, rhinorrhea, sinus pressure, sinus pain and tinnitus.    Eyes: Negative.  Negative for pain, redness and itching.   Respiratory: Negative for apnea, cough, choking, chest tightness, shortness of breath, wheezing and stridor.    Cardiovascular: Negative for chest  pain, palpitations and leg swelling.   Gastrointestinal: Negative for abdominal distention, abdominal pain, anal bleeding, blood in stool, constipation and diarrhea.   Endocrine: Negative.    Genitourinary: Negative for difficulty urinating, flank pain, frequency and urgency.   Musculoskeletal: Negative for arthralgias, back pain, gait problem and myalgias.   Skin: Negative for color change and pallor.   Allergic/Immunologic: Negative.    Neurological: Negative for dizziness, facial asymmetry, weakness, light-headedness and headaches.   Hematological: Negative for adenopathy. Does not bruise/bleed easily.     Objective:     Vital Signs (Most Recent):  Temp: 97.7 °F (36.5 °C) (12/15/19 0800)  Pulse: 62 (12/15/19 0748)  Resp: 16 (12/15/19 0748)  BP: 107/65 (12/15/19 0500)  SpO2: 96 % (12/15/19 0748) Vital Signs (24h Range):  Temp:  [96.6 °F (35.9 °C)-98.3 °F (36.8 °C)] 97.7 °F (36.5 °C)  Pulse:  [62-75] 62  Resp:  [15-24] 16  SpO2:  [94 %-98 %] 96 %  BP: (100-116)/(59-84) 107/65     Weight: 130 kg (286 lb 9.6 oz)  Body mass index is 49.19 kg/m².    Intake/Output Summary (Last 24 hours) at 12/15/2019 1042  Last data filed at 12/15/2019 0650  Gross per 24 hour   Intake 1366.67 ml   Output 5450 ml   Net -4083.33 ml      Physical Exam   Constitutional: She is oriented to person, place, and time. She appears well-developed and well-nourished.   HENT:   Head: Normocephalic and atraumatic.   Eyes: Pupils are equal, round, and reactive to light. EOM are normal.   Neck: Normal range of motion. Neck supple. No tracheal deviation present. No thyromegaly present.   Cardiovascular: Normal rate, regular rhythm and normal heart sounds.   Pulmonary/Chest: Effort normal and breath sounds normal.   Abdominal: Soft. Bowel sounds are normal. She exhibits no distension. There is no tenderness. There is no rebound and no guarding.   Musculoskeletal: Normal range of motion.   Lymphadenopathy:     She has no cervical adenopathy.    Neurological: She is alert and oriented to person, place, and time.   Skin: Skin is warm and dry. Capillary refill takes less than 2 seconds.   Psychiatric: She has a normal mood and affect. Her behavior is normal. Judgment and thought content normal.   Nursing note and vitals reviewed.      Significant Labs:   Recent Lab Results     None        All pertinent labs within the past 24 hours have been reviewed.    Significant Imaging: I have reviewed and interpreted all pertinent imaging results/findings within the past 24 hours.      Assessment/Plan:      * Intentional drug overdose  12/13/2019:  1.  Admit to intensive care unit  2.  Monitor vital signs by ICU protocol.  IV fluids at 100 cc/hour  Tylenol level in 4 hr and then in a.m..  Repeat labs in the a.m. to include CBC and CMP      12/14/2019  Continue same care overnight.  Advance diet as tolerated  Begin her antidepressant medications back.    12/15/2019:  Patient is stable  Tylenol level is remaining less than 10  Labs are completely normal  Advance diet as tolerated and continue antidepressant medication    Suicidal ideation  Will seek treatment for this patient when medically stable.    12/14/2019:  1.  Consult case management for possible placement on Monday  2.  Begin antidepressants back to her drug regimen.    12/15/2019:  Continue current level of care with monitoring and sitter  Attempt placement as soon as possible.    PATIENT IS MEDICALLY STABLE FOR TRANSFER TO ANY ACCEPTING PSYCHIATRIC TREATMENT FACILITY      VTE Risk Mitigation (From admission, onward)         Ordered     IP VTE LOW RISK PATIENT  Once      12/13/19 1630                      Warren Avalos MD  Department of Hospital Medicine   [facility] - ICU

## 2019-12-15 NOTE — SUBJECTIVE & OBJECTIVE
Interval History:     Review of Systems   Constitutional: Negative for activity change, appetite change, fatigue and fever.   HENT: Negative for congestion, ear discharge, mouth sores, nosebleeds, rhinorrhea, sinus pressure, sinus pain and tinnitus.    Eyes: Negative.  Negative for pain, redness and itching.   Respiratory: Negative for apnea, cough, choking, chest tightness, shortness of breath, wheezing and stridor.    Cardiovascular: Negative for chest pain, palpitations and leg swelling.   Gastrointestinal: Negative for abdominal distention, abdominal pain, anal bleeding, blood in stool, constipation and diarrhea.   Endocrine: Negative.    Genitourinary: Negative for difficulty urinating, flank pain, frequency and urgency.   Musculoskeletal: Negative for arthralgias, back pain, gait problem and myalgias.   Skin: Negative for color change and pallor.   Allergic/Immunologic: Negative.    Neurological: Negative for dizziness, facial asymmetry, weakness, light-headedness and headaches.   Hematological: Negative for adenopathy. Does not bruise/bleed easily.     Objective:     Vital Signs (Most Recent):  Temp: 97.7 °F (36.5 °C) (12/15/19 0800)  Pulse: 62 (12/15/19 0748)  Resp: 16 (12/15/19 0748)  BP: 107/65 (12/15/19 0500)  SpO2: 96 % (12/15/19 0748) Vital Signs (24h Range):  Temp:  [96.6 °F (35.9 °C)-98.3 °F (36.8 °C)] 97.7 °F (36.5 °C)  Pulse:  [62-75] 62  Resp:  [15-24] 16  SpO2:  [94 %-98 %] 96 %  BP: (100-116)/(59-84) 107/65     Weight: 130 kg (286 lb 9.6 oz)  Body mass index is 49.19 kg/m².    Intake/Output Summary (Last 24 hours) at 12/15/2019 1042  Last data filed at 12/15/2019 0650  Gross per 24 hour   Intake 1366.67 ml   Output 5450 ml   Net -4083.33 ml      Physical Exam   Constitutional: She is oriented to person, place, and time. She appears well-developed and well-nourished.   HENT:   Head: Normocephalic and atraumatic.   Eyes: Pupils are equal, round, and reactive to light. EOM are normal.   Neck: Normal  range of motion. Neck supple. No tracheal deviation present. No thyromegaly present.   Cardiovascular: Normal rate, regular rhythm and normal heart sounds.   Pulmonary/Chest: Effort normal and breath sounds normal.   Abdominal: Soft. Bowel sounds are normal. She exhibits no distension. There is no tenderness. There is no rebound and no guarding.   Musculoskeletal: Normal range of motion.   Lymphadenopathy:     She has no cervical adenopathy.   Neurological: She is alert and oriented to person, place, and time.   Skin: Skin is warm and dry. Capillary refill takes less than 2 seconds.   Psychiatric: She has a normal mood and affect. Her behavior is normal. Judgment and thought content normal.   Nursing note and vitals reviewed.      Significant Labs:   Recent Lab Results     None        All pertinent labs within the past 24 hours have been reviewed.    Significant Imaging: I have reviewed and interpreted all pertinent imaging results/findings within the past 24 hours.

## 2019-12-15 NOTE — NURSING
Spoke with Jeanne Garza at [facility] in [place]. Additional information provided. She stated she will pass this information along to the doctor and we will hear back from them when a decision is made.

## 2019-12-15 NOTE — ASSESSMENT & PLAN NOTE
Will seek treatment for this patient when medically stable.    12/14/2019:  1.  Consult case management for possible placement on Monday  2.  Begin antidepressants back to her drug regimen.    12/15/2019:  Continue current level of care with monitoring and sitter  Attempt placement as soon as possible.    PATIENT IS MEDICALLY STABLE FOR TRANSFER TO ANY ACCEPTING PSYCHIATRIC TREATMENT FACILITY

## 2019-12-15 NOTE — PLAN OF CARE
Plan of care reinforced with pt; 72hr hold, suicide precautions, safety discussed; 1:1 sitter at bedside. Awaiting placement for psychiatric treatment.

## 2019-12-15 NOTE — ASSESSMENT & PLAN NOTE
12/13/2019:  1.  Admit to intensive care unit  2.  Monitor vital signs by ICU protocol.  IV fluids at 100 cc/hour  Tylenol level in 4 hr and then in a.m..  Repeat labs in the a.m. to include CBC and CMP      12/14/2019  Continue same care overnight.  Advance diet as tolerated  Begin her antidepressant medications back.    12/15/2019:  Patient is stable  Tylenol level is remaining less than 10  Labs are completely normal  Advance diet as tolerated and continue antidepressant medication

## 2019-12-15 NOTE — NURSING
Patient slept peacefully through the night, no complaints or disruptions, vital signs stable and without significant irregularity.

## 2019-12-16 VITALS
TEMPERATURE: 98 F | RESPIRATION RATE: 20 BRPM | BODY MASS INDEX: 48.93 KG/M2 | HEIGHT: 64 IN | SYSTOLIC BLOOD PRESSURE: 123 MMHG | DIASTOLIC BLOOD PRESSURE: 77 MMHG | WEIGHT: 286.63 LBS | HEART RATE: 85 BPM | OXYGEN SATURATION: 95 %

## 2019-12-16 PROCEDURE — 99217 PR OBSERVATION CARE DISCHARGE: ICD-10-PCS | Mod: ,,, | Performed by: INTERNAL MEDICINE

## 2019-12-16 PROCEDURE — 96374 THER/PROPH/DIAG INJ IV PUSH: CPT | Mod: 59

## 2019-12-16 PROCEDURE — 63600175 PHARM REV CODE 636 W HCPCS: Performed by: INTERNAL MEDICINE

## 2019-12-16 PROCEDURE — 99217 PR OBSERVATION CARE DISCHARGE: CPT | Mod: ,,, | Performed by: INTERNAL MEDICINE

## 2019-12-16 PROCEDURE — S4991 NICOTINE PATCH NONLEGEND: HCPCS | Performed by: INTERNAL MEDICINE

## 2019-12-16 PROCEDURE — G0378 HOSPITAL OBSERVATION PER HR: HCPCS

## 2019-12-16 PROCEDURE — C9113 INJ PANTOPRAZOLE SODIUM, VIA: HCPCS | Performed by: INTERNAL MEDICINE

## 2019-12-16 PROCEDURE — 25000003 PHARM REV CODE 250: Performed by: INTERNAL MEDICINE

## 2019-12-16 RX ADMIN — PROPRANOLOL HYDROCHLORIDE 20 MG: 10 TABLET ORAL at 09:12

## 2019-12-16 RX ADMIN — PANTOPRAZOLE SODIUM 40 MG: 40 INJECTION, POWDER, LYOPHILIZED, FOR SOLUTION INTRAVENOUS at 09:12

## 2019-12-16 RX ADMIN — TOPIRAMATE 25 MG: 25 TABLET, FILM COATED ORAL at 09:12

## 2019-12-16 RX ADMIN — NICOTINE 1 PATCH: 21 PATCH, EXTENDED RELEASE TRANSDERMAL at 09:12

## 2019-12-16 RX ADMIN — BUSPIRONE HYDROCHLORIDE 10 MG: 5 TABLET ORAL at 09:12

## 2019-12-16 RX ADMIN — SENNOSIDES AND DOCUSATE SODIUM 1 TABLET: 8.6; 5 TABLET ORAL at 09:12

## 2019-12-16 RX ADMIN — FUROSEMIDE 40 MG: 20 TABLET ORAL at 09:12

## 2019-12-16 RX ADMIN — DULOXETINE 60 MG: 30 CAPSULE, DELAYED RELEASE ORAL at 09:12

## 2019-12-16 RX ADMIN — GABAPENTIN 300 MG: 300 CAPSULE ORAL at 09:12

## 2019-12-16 NOTE — NURSING
Received call from [facility] facility, representative with further questions regarding patient, awaiting return phone call after representative speaks to MD

## 2019-12-16 NOTE — NURSING
[facility] called for notification of patient in ICU needing evaluation, awaiting arrival to ICU for evaluation

## 2019-12-16 NOTE — NURSING
72 hour reordered at this time. Pt is ambulatory, off oxygen and voids. Butler removed. Hl in rt hand removed at this time. Todays weight is 286lbs.

## 2019-12-16 NOTE — NURSING
Patients mother, Carolina Pierce, [phone], notified of patients acceptance to [facility]

## 2019-12-16 NOTE — PLAN OF CARE
12/16/19 1657   Discharge Reassessment   Assessment Type Final Discharge Note   Anticipated Discharge Disposition Psych   KATIA HAS CONTACTED  [facility]'S ASSESSMENT DEPARTMENT AND SPOKEN WITH ROSA MARIA. KATIA REQUESTED THAT PT BE RECONSIDERED SINCE HER 72 HOUR HOLD ENDED EARLY THIS PM. SHE CALLED BACK TO SAY PT HAD BEEN ACCEPTED. PSYCH PLACEMENT TEAM AND KATIA WERE DUPLICATING EFFORTS IN NEW  PLACEMENT SYSTEM. MD WILL PUT ADMISSION ORDERS IN COMPUTER AND PT WILL BE TRANSPORTED VIA [facility]. NO OTHER DISCHARGE NEEDS IDENTIFIED AT THIS TIME.

## 2019-12-16 NOTE — PROGRESS NOTES
[facility] - ICU Hospital Medicine  Progress Note    Patient Name: Roya Booker  MRN: [id number]  Patient Class: OP- Observation   Admission Date: 2019  Length of Stay: 0 days  Attending Physician: Warren Avalos MD  Primary Care Provider: Charity Allen MD        Subjective:     Principal Problem:Intentional drug overdose        HPI:  Patient is a 39-year-old who presented to the emergency department complaining of overdose of Tylenol p.m. and gabapentin.  Patient also has a history of drug use and has a history of previous suicidal attempt.  She states unequivocally that this was a suicide attempt.  States that she had a  recently  and she was made him for 17 years and just wants to go with him.  Patient has past medical history of anxiety/depression, osteoarthritis and Parkinson's disease. Patient is on carbidopa levodopa.    Overview/Hospital Course:  2019:  Patient has been stable overnight without complaints of pain or somatic complaints.  Patient states chest a broken heart   Labs are normal of Tylenol levels pending liver function tests are normal\signs are stable with normal blood pressure temperature 98.1° pulse 75 respirations even and nonlabored.    12/15/2019:  Patient has been stable overnight and still having very this for of affect.  Labs are normal  Continue current treatment and monitoring in the ICU for suicidal ideation  Attempt for placement as soon as possible by case management    2019:  Patient is medically stable having no somatic complaints.  Patient remains depressed and sad with flat affect  Labs were reviewed and normal  Patient was evaluated this morning by mental health and they overwhelmingly recommended that this patient be placed in an inpatient facility.  Patient is a significant risk for repeat attempt of suicide.      Interval History:     Review of Systems   Constitutional: Negative for activity change,  appetite change, fatigue and fever.   HENT: Negative for congestion, ear discharge, mouth sores, nosebleeds, rhinorrhea, sinus pressure, sinus pain and tinnitus.    Eyes: Negative.  Negative for pain, redness and itching.   Respiratory: Negative for apnea, cough, choking, chest tightness, shortness of breath, wheezing and stridor.    Cardiovascular: Negative for chest pain, palpitations and leg swelling.   Gastrointestinal: Negative for abdominal distention, abdominal pain, anal bleeding, blood in stool, constipation and diarrhea.   Endocrine: Negative.    Genitourinary: Negative for difficulty urinating, flank pain, frequency and urgency.   Musculoskeletal: Negative for arthralgias, back pain, gait problem and myalgias.   Skin: Negative for color change and pallor.   Allergic/Immunologic: Negative.    Neurological: Negative for dizziness, facial asymmetry, weakness, light-headedness and headaches.   Hematological: Negative for adenopathy. Does not bruise/bleed easily.     Objective:     Vital Signs (Most Recent):  Temp: 97.8 °F (36.6 °C) (12/16/19 0800)  Pulse: (!) 54 (12/16/19 0800)  Resp: 20 (12/16/19 0800)  BP: 104/61 (12/16/19 0800)  SpO2: 95 % (12/16/19 0800) Vital Signs (24h Range):  Temp:  [97.5 °F (36.4 °C)-98.3 °F (36.8 °C)] 97.8 °F (36.6 °C)  Pulse:  [54-79] 54  Resp:  [16-26] 20  SpO2:  [93 %-98 %] 95 %  BP: (104-144)/(61-89) 104/61     Weight: 130 kg (286 lb 9.6 oz)  Body mass index is 49.19 kg/m².    Intake/Output Summary (Last 24 hours) at 12/16/2019 1325  Last data filed at 12/16/2019 1200  Gross per 24 hour   Intake 720 ml   Output 2800 ml   Net -2080 ml      Physical Exam   Constitutional: She is oriented to person, place, and time. She appears well-developed and well-nourished.   HENT:   Head: Normocephalic and atraumatic.   Eyes: Pupils are equal, round, and reactive to light. EOM are normal.   Neck: Normal range of motion. Neck supple. No tracheal deviation present. No thyromegaly present.    Cardiovascular: Normal rate, regular rhythm and normal heart sounds.   Pulmonary/Chest: Effort normal and breath sounds normal.   Abdominal: Soft. Bowel sounds are normal. She exhibits no distension. There is no tenderness. There is no rebound and no guarding.   Musculoskeletal: Normal range of motion.   Lymphadenopathy:     She has no cervical adenopathy.   Neurological: She is alert and oriented to person, place, and time.   Skin: Skin is warm and dry. Capillary refill takes less than 2 seconds.       Significant Labs:   Recent Lab Results     None        All pertinent labs within the past 24 hours have been reviewed.    Significant Imaging: I have reviewed and interpreted all pertinent imaging results/findings within the past 24 hours.      Assessment/Plan:      * Intentional drug overdose  12/13/2019:  1.  Admit to intensive care unit  2.  Monitor vital signs by ICU protocol.  IV fluids at 100 cc/hour  Tylenol level in 4 hr and then in a.m..  Repeat labs in the a.m. to include CBC and CMP      12/14/2019  Continue same care overnight.  Advance diet as tolerated  Begin her antidepressant medications back.    12/15/2019:  Patient is stable  Tylenol level is remaining less than 10  Labs are completely normal  Advance diet as tolerated and continue antidepressant medication    12/16/2019  Patient is medically stable for placement when bed is found and available  Will continue current medications  Will extend 72 hr hold due to this patient's danger to herself.    Suicidal ideation  Will seek treatment for this patient when medically stable.    12/14/2019:  1.  Consult case management for possible placement on Monday  2.  Begin antidepressants back to her drug regimen.    12/15/2019:  Continue current level of care with monitoring and sitter  Attempt placement as soon as possible.    PATIENT IS MEDICALLY STABLE FOR TRANSFER TO ANY ACCEPTING PSYCHIATRIC TREATMENT FACILITY      VTE Risk Mitigation (From admission,  onward)         Ordered     IP VTE LOW RISK PATIENT  Once      12/13/19 9743                      Warren Avalos MD  Department of Hospital Medicine   [facility]

## 2019-12-16 NOTE — SUBJECTIVE & OBJECTIVE
Interval History:     Review of Systems   Constitutional: Negative for activity change, appetite change, fatigue and fever.   HENT: Negative for congestion, ear discharge, mouth sores, nosebleeds, rhinorrhea, sinus pressure, sinus pain and tinnitus.    Eyes: Negative.  Negative for pain, redness and itching.   Respiratory: Negative for apnea, cough, choking, chest tightness, shortness of breath, wheezing and stridor.    Cardiovascular: Negative for chest pain, palpitations and leg swelling.   Gastrointestinal: Negative for abdominal distention, abdominal pain, anal bleeding, blood in stool, constipation and diarrhea.   Endocrine: Negative.    Genitourinary: Negative for difficulty urinating, flank pain, frequency and urgency.   Musculoskeletal: Negative for arthralgias, back pain, gait problem and myalgias.   Skin: Negative for color change and pallor.   Allergic/Immunologic: Negative.    Neurological: Negative for dizziness, facial asymmetry, weakness, light-headedness and headaches.   Hematological: Negative for adenopathy. Does not bruise/bleed easily.     Objective:     Vital Signs (Most Recent):  Temp: 97.8 °F (36.6 °C) (12/16/19 0800)  Pulse: (!) 54 (12/16/19 0800)  Resp: 20 (12/16/19 0800)  BP: 104/61 (12/16/19 0800)  SpO2: 95 % (12/16/19 0800) Vital Signs (24h Range):  Temp:  [97.5 °F (36.4 °C)-98.3 °F (36.8 °C)] 97.8 °F (36.6 °C)  Pulse:  [54-79] 54  Resp:  [16-26] 20  SpO2:  [93 %-98 %] 95 %  BP: (104-144)/(61-89) 104/61     Weight: 130 kg (286 lb 9.6 oz)  Body mass index is 49.19 kg/m².    Intake/Output Summary (Last 24 hours) at 12/16/2019 1325  Last data filed at 12/16/2019 1200  Gross per 24 hour   Intake 720 ml   Output 2800 ml   Net -2080 ml      Physical Exam   Constitutional: She is oriented to person, place, and time. She appears well-developed and well-nourished.   HENT:   Head: Normocephalic and atraumatic.   Eyes: Pupils are equal, round, and reactive to light. EOM are normal.   Neck: Normal  range of motion. Neck supple. No tracheal deviation present. No thyromegaly present.   Cardiovascular: Normal rate, regular rhythm and normal heart sounds.   Pulmonary/Chest: Effort normal and breath sounds normal.   Abdominal: Soft. Bowel sounds are normal. She exhibits no distension. There is no tenderness. There is no rebound and no guarding.   Musculoskeletal: Normal range of motion.   Lymphadenopathy:     She has no cervical adenopathy.   Neurological: She is alert and oriented to person, place, and time.   Skin: Skin is warm and dry. Capillary refill takes less than 2 seconds.       Significant Labs:   Recent Lab Results     None        All pertinent labs within the past 24 hours have been reviewed.    Significant Imaging: I have reviewed and interpreted all pertinent imaging results/findings within the past 24 hours.

## 2019-12-16 NOTE — ASSESSMENT & PLAN NOTE
12/13/2019:  1.  Admit to intensive care unit  2.  Monitor vital signs by ICU protocol.  IV fluids at 100 cc/hour  Tylenol level in 4 hr and then in a.m..  Repeat labs in the a.m. to include CBC and CMP      12/14/2019  Continue same care overnight.  Advance diet as tolerated  Begin her antidepressant medications back.    12/15/2019:  Patient is stable  Tylenol level is remaining less than 10  Labs are completely normal  Advance diet as tolerated and continue antidepressant medication    12/16/2019  Patient is medically stable for placement when bed is found and available  Will continue current medications  Will extend 72 hr hold due to this patient's danger to herself.

## 2019-12-16 NOTE — DISCHARGE SUMMARY
[facility] - ICU Hospital Medicine  Discharge Summary      Patient Name: Roya Booker  MRN: [id number]  Admission Date: 2019  Hospital Length of Stay: 0 days  Discharge Date and Time:  2019 4:55 PM  Attending Physician: Warren Avalos MD   Discharging Provider: Warren Avalos MD  Primary Care Provider: Charity Allen MD      HPI:   Patient is a 39-year-old who presented to the emergency department complaining of overdose of Tylenol p.m. and gabapentin.  Patient also has a history of drug use and has a history of previous suicidal attempt.  She states unequivocally that this was a suicide attempt.  States that she had a  recently  and she was made him for 17 years and just wants to go with him.  Patient has past medical history of anxiety/depression, osteoarthritis and Parkinson's disease. Patient is on carbidopa levodopa.    * No surgery found *      Hospital Course:   2019:  Patient has been stable overnight without complaints of pain or somatic complaints.  Patient states chest a broken heart   Labs are normal of Tylenol levels pending liver function tests are normal\signs are stable with normal blood pressure temperature 98.1° pulse 75 respirations even and nonlabored.    12/15/2019:  Patient has been stable overnight and still having very this for of affect.  Labs are normal  Continue current treatment and monitoring in the ICU for suicidal ideation  Attempt for placement as soon as possible by case management    2019:  Patient is medically stable having no somatic complaints.  Patient remains depressed and sad with flat affect  Labs were reviewed and normal  Patient was evaluated this morning by mental health and they overwhelmingly recommended that this patient be placed in an inpatient facility.  Patient is a significant risk for repeat attempt of suicide.    Patient has been accepted to [facility] [place]  [place].  Patient will be transferred and will be transported tonight.     Consults:   Consults (From admission, onward)        Status Ordering Provider     Inpatient consult to Case Management  Once     Provider:  (Not yet assigned)    Acknowledged FREDI GALLEGOS          * Intentional drug overdose  12/13/2019:  1.  Admit to intensive care unit  2.  Monitor vital signs by ICU protocol.  IV fluids at 100 cc/hour  Tylenol level in 4 hr and then in a.m..  Repeat labs in the a.m. to include CBC and CMP      12/14/2019  Continue same care overnight.  Advance diet as tolerated  Begin her antidepressant medications back.    12/15/2019:  Patient is stable  Tylenol level is remaining less than 10  Labs are completely normal  Advance diet as tolerated and continue antidepressant medication    12/16/2019  Patient is medically stable for placement when bed is found and available  Will continue current medications  Will extend 72 hr hold due to this patient's danger to herself.    Transfer to [facility], [facility]      Final Active Diagnoses:    Diagnosis Date Noted POA    PRINCIPAL PROBLEM:  Intentional drug overdose [T50.902A] 12/13/2019 Yes    Suicidal ideation [R45.851] 12/13/2019 Not Applicable      Problems Resolved During this Admission:       Discharged Condition: stable    Disposition:     Follow Up:    Patient Instructions:   No discharge procedures on file.    Significant Diagnostic Studies: Labs: All labs within the past 24 hours have been reviewed    Pending Diagnostic Studies:     Procedure Component Value Units Date/Time    EKG 12-lead [[id number]]     Order Status:  Sent Lab Status:  No result          Medications:  Reconciled Home Medications:      Medication List      CONTINUE taking these medications    DULoxetine 60 MG capsule  Commonly known as:  CYMBALTA  Take 60 mg by mouth once daily.     furosemide 40 MG tablet  Commonly known as:  LASIX  Take 40 mg by mouth 2  (two) times daily.     gabapentin 300 MG capsule  Commonly known as:  NEURONTIN  Take 300 mg by mouth 3 (three) times daily.     omeprazole 40 MG capsule  Commonly known as:  PRILOSEC  Take 40 mg by mouth once daily.     potassium chloride 10 MEQ Tbsr  Commonly known as:  KLOR-CON  Take 10 mEq by mouth once.     propranolol 20 MG tablet  Commonly known as:  INDERAL  Take 20 mg by mouth 2 (two) times daily.     QUEtiapine 100 MG Tab  Commonly known as:  SEROQUEL  Take 100 mg by mouth every evening.     topiramate 25 MG tablet  Commonly known as:  TOPAMAX  Take 25 mg by mouth 2 (two) times daily.            Indwelling Lines/Drains at time of discharge:   Lines/Drains/Airways     None                 Time spent on the discharge of patient: 50 minutes  Patient was seen and examined on the date of discharge and determined to be suitable for discharge.         Warren Avalos MD  Department of Hospital Medicine  [facility]

## 2019-12-16 NOTE — PLAN OF CARE
12/16/19 1706   Final Note   Assessment Type Final Discharge Note   Anticipated Discharge Disposition Psych   What phone number can be called within the next 1-3 days to see how you are doing after discharge? [phone]   PT HAS BEEN ACCEPTED FOR ADMISSION TO [facility]. PT WILL BE TRANSPORTED VIA [facility]. NO OTHER DISCHARGE NEEDS IDENTIFIED AT THISTIME.

## 2019-12-16 NOTE — NURSING
"Pt calm, cooperative, watching television. Requesting chocolate ice cream. Continues to state that she is very lonely and doesn't want to continue living life lonely. Tearful, but easily redirected. Encouraged to express feelings. States that she misses her first  and often listens to the song "Natasha Herrera" and cries.  Doesn't feel as if she has any support system, other than her friend, Natalie, and her dog, Renate.Pt continues to be high risk for suicide.  Pt encouraged to get help/counseling and to give us a chance to find some treatment for her.  "

## 2019-12-16 NOTE — PLAN OF CARE
12/16/19 1327   Discharge Assessment   Assessment Type Discharge Planning Assessment   Confirmed/corrected address and phone number on facesheet? Yes   Assessment information obtained from? Patient   Expected Length of Stay (days) 4   Communicated expected length of stay with patient/caregiver yes   Prior to hospitilization cognitive status: Alert/Oriented   Prior to hospitalization functional status: Independent   Current cognitive status: Alert/Oriented   Current Functional Status: Independent   Lives With alone   Able to Return to Prior Arrangements yes   Is patient able to care for self after discharge? Yes   Who are your caregiver(s) and their phone number(s)? Mother - Carolina Urena [phone]   Patient's perception of discharge disposition psychiatric hospital   Readmission Within the Last 30 Days no previous admission in last 30 days   Patient currently being followed by outpatient case management? No   Patient currently receives any other outside agency services? No   Equipment Currently Used at Home none   Do you have any problems affording any of your prescribed medications? TBD   Is the patient taking medications as prescribed? yes   Does the patient have transportation home? Yes   Transportation Anticipated family or friend will provide   Does the patient receive services at the [facility]? No   Discharge Plan A Psychiatric hospital   DME Needed Upon Discharge  none   Patient/Family in Agreement with Plan yes     Patient resting in bed.  States she lives alone but has mother/friends available for support.  Denies any use of home health services or dme.  Denies any needs at this time.  States she feels she is going to a psychiatric facility at time of discharge.  Case Management will continue to follow for further needs.

## 2019-12-16 NOTE — NURSING
Assumed care of patient from SIDNEY Ontiveros.  Patient appears to be sleeping in bed with back turned toward door.  Butler in place with adequate amounts of clear yellow urine.

## 2019-12-16 NOTE — PLAN OF CARE
[facility]    FACILITY TRANSFER ORDERS      Patient Name: Roya Booker  YOB: 1980    PCP: Charity Allen MD   PCP Address: [address] [place] [address] [place] [id number]  PCP Phone Number: [phone]  PCP Fax: [phone]    Encounter Date: 12/16/2019    Admit to: [facility]    Vital Signs:  Routine    Diagnoses:   Active Hospital Problems    Diagnosis  POA    *Intentional drug overdose [T50.902A]  Yes    Suicidal ideation [R45.851]  Not Applicable      Resolved Hospital Problems   No resolved problems to display.       Allergies:Review of patient's allergies indicates:  No Known Allergies    Diet: regular diet    Activities: Activity as tolerated    Nursing: Routine     Labs: CBC and CMP Once     CONSULTS:    Physical Therapy to evaluate and treat.     MISCELLANEOUS CARE:  Routine Skin for Bedridden Patients: Apply moisture barrier cream to all skin folds and wet areas in perineal area daily and after baths and all bowel movements.    WOUND CARE ORDERS  None    Medications: Review discharge medications with patient and family and provide education.      Current Discharge Medication List      CONTINUE these medications which have NOT CHANGED    Details   DULoxetine (CYMBALTA) 60 MG capsule Take 60 mg by mouth once daily.      furosemide (LASIX) 40 MG tablet Take 40 mg by mouth 2 (two) times daily.      gabapentin (NEURONTIN) 300 MG capsule Take 300 mg by mouth 3 (three) times daily.      omeprazole (PRILOSEC) 40 MG capsule Take 40 mg by mouth once daily.      potassium chloride (KLOR-CON) 10 MEQ TbSR Take 10 mEq by mouth once.      propranolol (INDERAL) 20 MG tablet Take 20 mg by mouth 2 (two) times daily.       QUEtiapine (SEROQUEL) 100 MG Tab Take 100 mg by mouth every evening.      topiramate (TOPAMAX) 25 MG tablet Take 25 mg by mouth 2 (two) times daily.                  _________________________________  Warren Avalos MD  12/16/2019

## 2019-12-16 NOTE — ASSESSMENT & PLAN NOTE
12/13/2019:  1.  Admit to intensive care unit  2.  Monitor vital signs by ICU protocol.  IV fluids at 100 cc/hour  Tylenol level in 4 hr and then in a.m..  Repeat labs in the a.m. to include CBC and CMP      12/14/2019  Continue same care overnight.  Advance diet as tolerated  Begin her antidepressant medications back.    12/15/2019:  Patient is stable  Tylenol level is remaining less than 10  Labs are completely normal  Advance diet as tolerated and continue antidepressant medication    12/16/2019  Patient is medically stable for placement when bed is found and available  Will continue current medications  Will extend 72 hr hold due to this patient's danger to herself.    Transfer to [facility], [facility]

## 2019-12-17 NOTE — NURSING
Attempted to call report to [facility] for pending patient transfer, Nurse unavailable to take report at this time, asked to call back

## 2020-02-21 ENCOUNTER — HOSPITAL ENCOUNTER (EMERGENCY)
Facility: HOSPITAL | Age: 40
Discharge: HOME | End: 2020-02-22
Attending: FAMILY MEDICINE

## 2020-02-21 DIAGNOSIS — M79.89 LEG SWELLING: Primary | ICD-10-CM

## 2020-02-21 DIAGNOSIS — R52 PAIN: ICD-10-CM

## 2020-02-21 LAB
ALBUMIN SERPL BCP-MCNC: 3.4 G/DL (ref 3.5–5.2)
ALP SERPL-CCNC: 51 U/L (ref 55–135)
ALT SERPL W/O P-5'-P-CCNC: 14 U/L (ref 10–44)
ANION GAP SERPL CALC-SCNC: 6 MMOL/L (ref 8–16)
AST SERPL-CCNC: 17 U/L (ref 10–40)
BASOPHILS # BLD AUTO: 0.05 K/UL (ref 0–0.2)
BASOPHILS NFR BLD: 0.6 % (ref 0–1.9)
BILIRUB SERPL-MCNC: 0.4 MG/DL (ref 0.1–1)
BUN SERPL-MCNC: 14 MG/DL (ref 6–20)
CALCIUM SERPL-MCNC: 8.1 MG/DL (ref 8.7–10.5)
CHLORIDE SERPL-SCNC: 105 MMOL/L (ref 95–110)
CO2 SERPL-SCNC: 25 MMOL/L (ref 23–29)
CREAT SERPL-MCNC: 0.7 MG/DL (ref 0.5–1.4)
D DIMER PPP FEU-MCNC: <100 NG/ML (ref 100–400)
DIFFERENTIAL METHOD: ABNORMAL
EOSINOPHIL # BLD AUTO: 0.3 K/UL (ref 0–0.5)
EOSINOPHIL NFR BLD: 3.1 % (ref 0–8)
ERYTHROCYTE [DISTWIDTH] IN BLOOD BY AUTOMATED COUNT: 12.6 % (ref 11.5–14.5)
EST. GFR  (AFRICAN AMERICAN): >60 ML/MIN/1.73 M^2
EST. GFR  (NON AFRICAN AMERICAN): >60 ML/MIN/1.73 M^2
GLUCOSE SERPL-MCNC: 93 MG/DL (ref 70–110)
HCT VFR BLD AUTO: 36.7 % (ref 37–48.5)
HGB BLD-MCNC: 12 G/DL (ref 12–16)
IMM GRANULOCYTES # BLD AUTO: 0.03 K/UL (ref 0–0.04)
IMM GRANULOCYTES NFR BLD AUTO: 0.4 % (ref 0–0.5)
LYMPHOCYTES # BLD AUTO: 2.5 K/UL (ref 1–4.8)
LYMPHOCYTES NFR BLD: 30.6 % (ref 18–48)
MCH RBC QN AUTO: 30.2 PG (ref 27–31)
MCHC RBC AUTO-ENTMCNC: 32.7 G/DL (ref 32–36)
MCV RBC AUTO: 92 FL (ref 82–98)
MONOCYTES # BLD AUTO: 0.6 K/UL (ref 0.3–1)
MONOCYTES NFR BLD: 7.6 % (ref 4–15)
NEUTROPHILS # BLD AUTO: 4.7 K/UL (ref 1.8–7.7)
NEUTROPHILS NFR BLD: 57.7 % (ref 38–73)
NRBC BLD-RTO: 0 /100 WBC
PLATELET # BLD AUTO: 243 K/UL (ref 150–350)
PMV BLD AUTO: 9.9 FL (ref 9.2–12.9)
POTASSIUM SERPL-SCNC: 3.6 MMOL/L (ref 3.5–5.1)
PROT SERPL-MCNC: 6.7 G/DL (ref 6–8.4)
RBC # BLD AUTO: 3.97 M/UL (ref 4–5.4)
SODIUM SERPL-SCNC: 136 MMOL/L (ref 136–145)
WBC # BLD AUTO: 8.14 K/UL (ref 3.9–12.7)

## 2020-02-21 PROCEDURE — 99284 EMERGENCY DEPT VISIT MOD MDM: CPT | Mod: 25

## 2020-02-21 PROCEDURE — 73560 X-RAY EXAM OF KNEE 1 OR 2: CPT | Mod: TC,FY,RT

## 2020-02-21 PROCEDURE — 73560 X-RAY EXAM OF KNEE 1 OR 2: CPT | Mod: 26,RT,, | Performed by: RADIOLOGY

## 2020-02-21 PROCEDURE — 73590 X-RAY EXAM OF LOWER LEG: CPT | Mod: TC,FY,RT

## 2020-02-21 PROCEDURE — 73590 X-RAY EXAM OF LOWER LEG: CPT | Mod: 26,RT,, | Performed by: RADIOLOGY

## 2020-02-21 PROCEDURE — 80053 COMPREHEN METABOLIC PANEL: CPT

## 2020-02-21 PROCEDURE — 73590 XR TIBIA FIBULA 2 VIEW RIGHT: ICD-10-PCS | Mod: 26,RT,, | Performed by: RADIOLOGY

## 2020-02-21 PROCEDURE — 73560 XR KNEE 1 OR 2 VIEW RIGHT: ICD-10-PCS | Mod: 26,RT,, | Performed by: RADIOLOGY

## 2020-02-21 PROCEDURE — 85379 FIBRIN DEGRADATION QUANT: CPT

## 2020-02-21 PROCEDURE — 85025 COMPLETE CBC W/AUTO DIFF WBC: CPT

## 2020-02-22 VITALS
SYSTOLIC BLOOD PRESSURE: 129 MMHG | TEMPERATURE: 98 F | BODY MASS INDEX: 38.98 KG/M2 | RESPIRATION RATE: 18 BRPM | HEIGHT: 63 IN | DIASTOLIC BLOOD PRESSURE: 79 MMHG | WEIGHT: 220 LBS | OXYGEN SATURATION: 97 % | HEART RATE: 76 BPM

## 2020-02-22 NOTE — ED NOTES
"Pt refusing to get dressed. Pt reports she is unable to get ride. States " I can't get them to answer, I'm not sitting in the waiting room like a homeless kid". Pt informed of discharge status. Informed will give her a little more time to find ride.  "

## 2020-02-22 NOTE — ED TRIAGE NOTES
"Pt to ED with c/o right leg swelling x 3-4 days that has worsened. Pt states she has been hospitalized for same symptoms previously and states, " I  was septic".  "

## 2020-02-24 NOTE — ED PROVIDER NOTES
Encounter Date: 2/21/2020       History     Chief Complaint   Patient presents with    Leg Swelling     39-year-old female presents complaining is pain and swelling to the right leg she was 12 years old when she had major surgery the right leg after a fracture from a trauma it has been the same since patient is concerned about blood clot she has had these prep prior she has recently moved to the area has no primary care        Review of patient's allergies indicates:  No Known Allergies  Past Medical History:   Diagnosis Date    Anxiety     Depression     Migraine headache     Morbid obesity     Renal disorder     kidney stone     Past Surgical History:   Procedure Laterality Date    CHOLECYSTECTOMY      FRACTURE SURGERY  1984    right lower leg reconstruction surgery s/p trauma injury    TUBAL LIGATION       Family History   Problem Relation Age of Onset    Heart disease Mother      Social History     Tobacco Use    Smoking status: Current Every Day Smoker     Packs/day: 1.00     Types: Cigarettes    Smokeless tobacco: Never Used   Substance Use Topics    Alcohol use: No    Drug use: Never     Review of Systems   Constitutional: Negative for fever.   HENT: Negative for sore throat.    Respiratory: Negative for shortness of breath.    Cardiovascular: Negative for chest pain.   Gastrointestinal: Negative for nausea.   Genitourinary: Negative for dysuria.   Musculoskeletal: Positive for arthralgias and gait problem. Negative for back pain.   Skin: Negative for rash.   Neurological: Negative for weakness.   Hematological: Does not bruise/bleed easily.       Physical Exam     Initial Vitals [02/21/20 2132]   BP Pulse Resp Temp SpO2   136/84 89 18 98 °F (36.7 °C) 98 %      MAP       --         Physical Exam    Nursing note and vitals reviewed.  Constitutional: She appears well-developed and well-nourished. She is not diaphoretic. No distress.   HENT:   Head: Normocephalic and atraumatic.   Right Ear:  External ear normal.   Left Ear: External ear normal.   Eyes: Pupils are equal, round, and reactive to light. Right eye exhibits no discharge. Left eye exhibits no discharge.   Neck: No tracheal deviation present. No JVD present.   Cardiovascular: Exam reveals no friction rub.    No murmur heard.  Pulmonary/Chest: No stridor. No respiratory distress. She has no wheezes. She has no rales.   Abdominal: Bowel sounds are normal. She exhibits no distension.   Musculoskeletal: Normal range of motion.   Right leg is chronically scarred and seems to be slightly enlarged over the left calf there is no lola Homans sign   Neurological: She is alert.   Skin: Skin is warm.   Psychiatric: She has a normal mood and affect.         ED Course   Procedures  Labs Reviewed   CBC W/ AUTO DIFFERENTIAL - Abnormal; Notable for the following components:       Result Value    RBC 3.97 (*)     Hematocrit 36.7 (*)     All other components within normal limits    Narrative:     Recoll. [id number] by [email or web address] at 02/21/2020 22:32, reason: Specimen   clotted   COMPREHENSIVE METABOLIC PANEL - Abnormal; Notable for the following components:    Calcium 8.1 (*)     Albumin 3.4 (*)     Alkaline Phosphatase 51 (*)     Anion Gap 6 (*)     All other components within normal limits   D DIMER, QUANTITATIVE    Narrative:     Recoll. [id number] by [email or web address] at 02/21/2020 22:32, reason: Specimen   clotted          Imaging Results           X-Ray Tibia Fibula 2 View Right (Final result)  Result time 02/22/20 08:21:30    Final result by Raheem Hughes MD (02/22/20 08:21:30)                 Impression:      1. No acute fracture or dislocation.  2. Chronic asymmetric cortical thickening and irregularity of the tibial diaphysis.  This can be seen with chronic stress changes/shin splints.  Further evaluation with MRI of the right tibia and fibula as deemed clinically necessary.  3. Prominent calcaneal spur.  This report was flagged in Epic as abnormal.    Examination  added to the ER follow-up folder.      Electronically signed by: Raheem Hughes  Date:    02/22/2020  Time:    08:21             Narrative:    EXAMINATION:  XR TIBIA FIBULA 2 VIEW RIGHT; XR KNEE 1 OR 2 VIEW RIGHT    CLINICAL HISTORY:  pain;  Pain, unspecified    TECHNIQUE:  AP and lateral views of the right knee, tibia and fibula were performed.    COMPARISON:  01/21/2017.    FINDINGS:  No acute fracture or dislocation.  No significant soft tissue swelling.    The medial and lateral compartment joint spaces are preserved.  Patellofemoral articulation is intact.  No significant suprapatellar effusion.    There is chronic asymmetric cortical thickening along the medial aspect of the tibial diaphysis.  There is associated subtle cortical irregularity.  This is of uncertain etiology.  The fibula is intact.  Tibiotalar articulation is intact.    Prominent calcaneal spur.                                X-Ray Knee 1 or 2 View Right (Final result)  Result time 02/22/20 08:21:30    Final result by Raheem Hughes MD (02/22/20 08:21:30)                 Impression:      1. No acute fracture or dislocation.  2. Chronic asymmetric cortical thickening and irregularity of the tibial diaphysis.  This can be seen with chronic stress changes/shin splints.  Further evaluation with MRI of the right tibia and fibula as deemed clinically necessary.  3. Prominent calcaneal spur.  This report was flagged in Epic as abnormal.    Examination added to the ER follow-up folder.      Electronically signed by: Raheem Hughes  Date:    02/22/2020  Time:    08:21             Narrative:    EXAMINATION:  XR TIBIA FIBULA 2 VIEW RIGHT; XR KNEE 1 OR 2 VIEW RIGHT    CLINICAL HISTORY:  pain;  Pain, unspecified    TECHNIQUE:  AP and lateral views of the right knee, tibia and fibula were performed.    COMPARISON:  01/21/2017.    FINDINGS:  No acute fracture or dislocation.  No significant soft tissue swelling.    The medial and lateral compartment joint  spaces are preserved.  Patellofemoral articulation is intact.  No significant suprapatellar effusion.    There is chronic asymmetric cortical thickening along the medial aspect of the tibial diaphysis.  There is associated subtle cortical irregularity.  This is of uncertain etiology.  The fibula is intact.  Tibiotalar articulation is intact.    Prominent calcaneal spur.                                                                 Clinical Impression:       ICD-10-CM ICD-9-CM   1. Leg swelling M79.89 729.81   2. Pain R52 780.96                             Sai Shahid MD  02/24/20 0457

## 2020-03-29 ENCOUNTER — HOSPITAL ENCOUNTER (EMERGENCY)
Facility: HOSPITAL | Age: 40
Discharge: HOME | End: 2020-03-29
Attending: FAMILY MEDICINE
Payer: MEDICAID

## 2020-03-29 VITALS
DIASTOLIC BLOOD PRESSURE: 78 MMHG | RESPIRATION RATE: 20 BRPM | BODY MASS INDEX: 48.14 KG/M2 | HEART RATE: 106 BPM | WEIGHT: 282 LBS | OXYGEN SATURATION: 97 % | SYSTOLIC BLOOD PRESSURE: 133 MMHG | HEIGHT: 64 IN | TEMPERATURE: 99 F

## 2020-03-29 DIAGNOSIS — L03.115 CELLULITIS OF RIGHT THIGH: Primary | ICD-10-CM

## 2020-03-29 PROCEDURE — S0077 INJECTION, CLINDAMYCIN PHOSP: HCPCS | Performed by: NURSE PRACTITIONER

## 2020-03-29 PROCEDURE — 99284 EMERGENCY DEPT VISIT MOD MDM: CPT | Mod: 25

## 2020-03-29 PROCEDURE — 25000003 PHARM REV CODE 250: Performed by: NURSE PRACTITIONER

## 2020-03-29 PROCEDURE — 96372 THER/PROPH/DIAG INJ SC/IM: CPT

## 2020-03-29 RX ORDER — DOXYCYCLINE 100 MG/1
100 CAPSULE ORAL 2 TIMES DAILY
Qty: 20 CAPSULE | Refills: 0 | Status: SHIPPED | OUTPATIENT
Start: 2020-03-29 | End: 2020-04-08

## 2020-03-29 RX ORDER — CLINDAMYCIN PHOSPHATE 150 MG/ML
600 INJECTION, SOLUTION INTRAVENOUS
Status: COMPLETED | OUTPATIENT
Start: 2020-03-29 | End: 2020-03-29

## 2020-03-29 RX ORDER — HYDROCODONE BITARTRATE AND ACETAMINOPHEN 5; 325 MG/1; MG/1
1 TABLET ORAL EVERY 6 HOURS PRN
Qty: 12 TABLET | Refills: 0 | Status: SHIPPED | OUTPATIENT
Start: 2020-03-29 | End: 2020-04-01

## 2020-03-29 RX ORDER — HYDROCODONE BITARTRATE AND ACETAMINOPHEN 10; 325 MG/1; MG/1
1 TABLET ORAL
Status: COMPLETED | OUTPATIENT
Start: 2020-03-29 | End: 2020-03-29

## 2020-03-29 RX ADMIN — CLINDAMYCIN PHOSPHATE 600 MG: 150 INJECTION, SOLUTION INTRAMUSCULAR; INTRAVENOUS at 07:03

## 2020-03-29 RX ADMIN — HYDROCODONE BITARTRATE AND ACETAMINOPHEN 1 TABLET: 10; 325 TABLET ORAL at 07:03

## 2020-03-30 NOTE — ED PROVIDER NOTES
Encounter Date: 3/29/2020       History     Chief Complaint   Patient presents with    Abscess     Patient has an abscess on her right inner thigh near her buttocks x1 week.     40yo WF w/ c/o R inner thigh cellulitis/pain x1 week; exacerbating factors include palpation, denies alleviating factors, OTC meds not helping, started gradually & progressively worsening -- denies fever, injury, previous surgery -- no previous evaluation has been performed nor has PCP been contacted for today's concerns    Past medical/surgical history, allergies & current medications reviewed with patient    Menstrual cycle on now, reported as normal    The history is provided by the patient. No  was used.     Review of patient's allergies indicates:  No Known Allergies  Past Medical History:   Diagnosis Date    Anxiety     Depression     Migraine headache     Morbid obesity     Renal disorder     kidney stone     Past Surgical History:   Procedure Laterality Date    CHOLECYSTECTOMY      FRACTURE SURGERY  1984    right lower leg reconstruction surgery s/p trauma injury    TUBAL LIGATION       Family History   Problem Relation Age of Onset    Heart disease Mother      Social History     Tobacco Use    Smoking status: Current Every Day Smoker     Packs/day: 1.00     Types: Cigarettes    Smokeless tobacco: Never Used   Substance Use Topics    Alcohol use: No    Drug use: Never     Review of Systems   Constitutional: Negative for fever.   HENT: Negative for sore throat.    Respiratory: Negative for shortness of breath.    Cardiovascular: Negative for chest pain.   Gastrointestinal: Negative for nausea.   Genitourinary: Negative for dysuria.   Musculoskeletal: Positive for myalgias. Negative for back pain.   Skin: Positive for color change. Negative for rash.   Neurological: Negative for weakness.   Hematological: Does not bruise/bleed easily.   All other systems reviewed and are negative.      Physical Exam      Initial Vitals [03/29/20 1927]   BP Pulse Resp Temp SpO2   133/78 106 20 98.9 °F (37.2 °C) 97 %      MAP       --         Physical Exam    Nursing note and vitals reviewed.  Constitutional: She is not diaphoretic. She is Obese . No distress.   Afebrile, vital signs stable   HENT:   Head: Normocephalic.   Right Ear: External ear normal.   Left Ear: External ear normal.   Nose: Nose normal.   Eyes: Lids are normal.   Neck: Neck supple.   Cardiovascular: Tachycardia present.    Pulmonary/Chest: Effort normal. No respiratory distress.   Abdominal: She exhibits no distension.   Genitourinary:         Neurological: She is alert.   Skin: No rash noted. There is erythema.   Psychiatric: Her mood appears anxious (but otherwise pleasant and cooperative).         ED Course   Procedures  Labs Reviewed - No data to display       Imaging Results    None          Medical Decision Making:   ED Management:  Medications given:  Clindamycin, Norco    Findings and plan of care discussed with patient:  Right inner thigh cellulitis; will discharge patient home with doxycycline & Norco, care instructions given -- further instructions given to follow-up with PCP, referral made to Family Medicine    All questions answered, strict return precautions given, patient verbalized understanding to all instructions, pleasant visit -- vital signs stable, patient is in no distress at discharge    MS/LA  reviewed x1 year -- no history found  We discussed the pros, cons and risks of opiate use, patient verbally acknowledged the risks of opioid/controlled substance use, patient instructed to read the information given with the medication by the pharmacy prior to taking the 1st dose.    Disclaimer:  This note was prepared with [employer] Naturally Speaking voice recognition transcription software. Garbled syntax, mangled pronouns, and other bizarre constructions may be attributed to that software system.                                   Clinical  Impression:       ICD-10-CM ICD-9-CM   1. Cellulitis of right thigh L03.115 682.6             ED Disposition Condition    Discharge Stable        ED Prescriptions     Medication Sig Dispense Start Date End Date Auth. Provider    doxycycline (VIBRAMYCIN) 100 MG Cap Take 1 capsule (100 mg total) by mouth 2 (two) times daily. for 10 days 20 capsule 3/29/2020 4/8/2020 Chi Gabriel NP    HYDROcodone-acetaminophen (NORCO) 5-325 mg per tablet Take 1 tablet by mouth every 6 (six) hours as needed for Pain. 12 tablet 3/29/2020 4/1/2020 Chi Gabriel NP        Follow-up Information     Follow up With Specialties Details Why Contact Info    Primary care provider  Go to  When scheduled                                      Chi Gabriel NP  03/29/20 5632

## 2020-03-30 NOTE — DISCHARGE INSTRUCTIONS
Do not drink alcohol, drive or operate heavy machinery while taking pain medications.  Only take medication as written and never take more than the maximum recommended dose of acetaminophen as written on the bottle.  Remember, most pain medications have acetaminophen in them and that needs to be taken into account with the total dose of acetaminophen daily.      Take all medications as prescribed.  Follow-up with your primary care provider as discussed.  Please remember that you had a visit to the emergency room today and this does not substitute as primary care services for ongoing management because emergency services is a snap shot in time.  Should you have any worsening condition that requires emergency services do not hesitate to return to the ER.

## 2021-10-25 ENCOUNTER — HOSPITAL ENCOUNTER (EMERGENCY)
Facility: HOSPITAL | Age: 41
Discharge: HOME | End: 2021-10-25
Attending: EMERGENCY MEDICINE
Payer: OTHER GOVERNMENT

## 2021-10-25 VITALS
TEMPERATURE: 99 F | WEIGHT: 240 LBS | RESPIRATION RATE: 20 BRPM | BODY MASS INDEX: 42.52 KG/M2 | HEIGHT: 63 IN | HEART RATE: 82 BPM | SYSTOLIC BLOOD PRESSURE: 113 MMHG | DIASTOLIC BLOOD PRESSURE: 87 MMHG | OXYGEN SATURATION: 98 %

## 2021-10-25 DIAGNOSIS — L03.90 CELLULITIS, UNSPECIFIED CELLULITIS SITE: Primary | ICD-10-CM

## 2021-10-25 DIAGNOSIS — M79.89 LEG SWELLING: ICD-10-CM

## 2021-10-25 LAB
ALBUMIN SERPL BCP-MCNC: 3.5 G/DL (ref 3.5–5.2)
ALP SERPL-CCNC: 71 U/L (ref 55–135)
ALT SERPL W/O P-5'-P-CCNC: 23 U/L (ref 10–44)
ANION GAP SERPL CALC-SCNC: 7 MMOL/L (ref 8–16)
AST SERPL-CCNC: 25 U/L (ref 10–40)
BASOPHILS # BLD AUTO: 0.03 K/UL (ref 0–0.2)
BASOPHILS NFR BLD: 0.4 % (ref 0–1.9)
BILIRUB SERPL-MCNC: 0.8 MG/DL (ref 0.1–1)
BUN SERPL-MCNC: 12 MG/DL (ref 6–20)
CALCIUM SERPL-MCNC: 8.4 MG/DL (ref 8.7–10.5)
CHLORIDE SERPL-SCNC: 104 MMOL/L (ref 95–110)
CO2 SERPL-SCNC: 21 MMOL/L (ref 23–29)
CREAT SERPL-MCNC: 0.8 MG/DL (ref 0.5–1.4)
DIFFERENTIAL METHOD: ABNORMAL
EOSINOPHIL # BLD AUTO: 0.1 K/UL (ref 0–0.5)
EOSINOPHIL NFR BLD: 1 % (ref 0–8)
ERYTHROCYTE [DISTWIDTH] IN BLOOD BY AUTOMATED COUNT: 13.4 % (ref 11.5–14.5)
EST. GFR  (AFRICAN AMERICAN): >60 ML/MIN/1.73 M^2
EST. GFR  (NON AFRICAN AMERICAN): >60 ML/MIN/1.73 M^2
GLUCOSE SERPL-MCNC: 92 MG/DL (ref 70–110)
HCT VFR BLD AUTO: 35.9 % (ref 37–48.5)
HGB BLD-MCNC: 11.5 G/DL (ref 12–16)
IMM GRANULOCYTES # BLD AUTO: 0.08 K/UL (ref 0–0.04)
IMM GRANULOCYTES NFR BLD AUTO: 1.1 % (ref 0–0.5)
LACTATE SERPL-SCNC: 0.6 MMOL/L (ref 0.5–1.9)
LYMPHOCYTES # BLD AUTO: 1 K/UL (ref 1–4.8)
LYMPHOCYTES NFR BLD: 14.9 % (ref 18–48)
MCH RBC QN AUTO: 28.8 PG (ref 27–31)
MCHC RBC AUTO-ENTMCNC: 32 G/DL (ref 32–36)
MCV RBC AUTO: 90 FL (ref 82–98)
MONOCYTES # BLD AUTO: 0.5 K/UL (ref 0.3–1)
MONOCYTES NFR BLD: 6.6 % (ref 4–15)
NEUTROPHILS # BLD AUTO: 5.3 K/UL (ref 1.8–7.7)
NEUTROPHILS NFR BLD: 76 % (ref 38–73)
NRBC BLD-RTO: 0 /100 WBC
PLATELET # BLD AUTO: 230 K/UL (ref 150–450)
PMV BLD AUTO: 9.8 FL (ref 9.2–12.9)
POTASSIUM SERPL-SCNC: 3.5 MMOL/L (ref 3.5–5.1)
PROT SERPL-MCNC: 7.6 G/DL (ref 6–8.4)
RBC # BLD AUTO: 3.99 M/UL (ref 4–5.4)
SARS-COV-2 RDRP RESP QL NAA+PROBE: NEGATIVE
SODIUM SERPL-SCNC: 132 MMOL/L (ref 136–145)
WBC # BLD AUTO: 7 K/UL (ref 3.9–12.7)

## 2021-10-25 PROCEDURE — 63600175 PHARM REV CODE 636 W HCPCS: Performed by: EMERGENCY MEDICINE

## 2021-10-25 PROCEDURE — U0002 COVID-19 LAB TEST NON-CDC: HCPCS | Performed by: EMERGENCY MEDICINE

## 2021-10-25 PROCEDURE — 96365 THER/PROPH/DIAG IV INF INIT: CPT

## 2021-10-25 PROCEDURE — 87040 BLOOD CULTURE FOR BACTERIA: CPT | Performed by: EMERGENCY MEDICINE

## 2021-10-25 PROCEDURE — 96366 THER/PROPH/DIAG IV INF ADDON: CPT

## 2021-10-25 PROCEDURE — 83605 ASSAY OF LACTIC ACID: CPT | Performed by: EMERGENCY MEDICINE

## 2021-10-25 PROCEDURE — 25000003 PHARM REV CODE 250: Performed by: EMERGENCY MEDICINE

## 2021-10-25 PROCEDURE — 99284 EMERGENCY DEPT VISIT MOD MDM: CPT | Mod: 25

## 2021-10-25 PROCEDURE — 96375 TX/PRO/DX INJ NEW DRUG ADDON: CPT

## 2021-10-25 PROCEDURE — 80053 COMPREHEN METABOLIC PANEL: CPT | Performed by: EMERGENCY MEDICINE

## 2021-10-25 PROCEDURE — 85025 COMPLETE CBC W/AUTO DIFF WBC: CPT | Performed by: EMERGENCY MEDICINE

## 2021-10-25 RX ORDER — ONDANSETRON 4 MG/1
4 TABLET, FILM COATED ORAL EVERY 6 HOURS PRN
Qty: 10 TABLET | Refills: 0 | Status: SHIPPED | OUTPATIENT
Start: 2021-10-25 | End: 2021-10-29

## 2021-10-25 RX ORDER — ACETAMINOPHEN 325 MG/1
325 TABLET ORAL EVERY 6 HOURS PRN
COMMUNITY

## 2021-10-25 RX ORDER — HYDROCODONE BITARTRATE AND ACETAMINOPHEN 5; 325 MG/1; MG/1
1 TABLET ORAL EVERY 4 HOURS PRN
Qty: 12 TABLET | Refills: 0 | Status: SHIPPED | OUTPATIENT
Start: 2021-10-25 | End: 2022-04-11

## 2021-10-25 RX ORDER — SUMATRIPTAN SUCCINATE 25 MG/1
50 TABLET ORAL 2 TIMES DAILY PRN
COMMUNITY

## 2021-10-25 RX ORDER — ONDANSETRON 2 MG/ML
4 INJECTION INTRAMUSCULAR; INTRAVENOUS
Status: COMPLETED | OUTPATIENT
Start: 2021-10-25 | End: 2021-10-25

## 2021-10-25 RX ORDER — MORPHINE SULFATE 4 MG/ML
4 INJECTION, SOLUTION INTRAMUSCULAR; INTRAVENOUS
Status: COMPLETED | OUTPATIENT
Start: 2021-10-25 | End: 2021-10-25

## 2021-10-25 RX ADMIN — ONDANSETRON 4 MG: 2 INJECTION INTRAMUSCULAR; INTRAVENOUS at 12:10

## 2021-10-25 RX ADMIN — SODIUM CHLORIDE 1000 ML: 0.9 INJECTION, SOLUTION INTRAVENOUS at 12:10

## 2021-10-25 RX ADMIN — MORPHINE SULFATE 4 MG: 4 INJECTION, SOLUTION INTRAMUSCULAR; INTRAVENOUS at 12:10

## 2021-10-25 RX ADMIN — DOXYCYCLINE 100 MG: 100 INJECTION, POWDER, LYOPHILIZED, FOR SOLUTION INTRAVENOUS at 12:10

## 2021-10-30 LAB
BACTERIA BLD CULT: NORMAL
BACTERIA BLD CULT: NORMAL

## 2021-12-03 ENCOUNTER — HOSPITAL ENCOUNTER (EMERGENCY)
Facility: HOSPITAL | Age: 41
Discharge: HOME | End: 2021-12-03
Payer: MEDICAID

## 2021-12-03 VITALS
BODY MASS INDEX: 49.61 KG/M2 | HEART RATE: 78 BPM | HEIGHT: 63 IN | SYSTOLIC BLOOD PRESSURE: 120 MMHG | TEMPERATURE: 98 F | DIASTOLIC BLOOD PRESSURE: 77 MMHG | RESPIRATION RATE: 20 BRPM | WEIGHT: 280 LBS | OXYGEN SATURATION: 99 %

## 2021-12-03 DIAGNOSIS — R93.89 THICKENED ENDOMETRIUM: Primary | ICD-10-CM

## 2021-12-03 DIAGNOSIS — R19.7 DIARRHEA, UNSPECIFIED TYPE: ICD-10-CM

## 2021-12-03 DIAGNOSIS — R10.9 ABDOMINAL PAIN, UNSPECIFIED ABDOMINAL LOCATION: ICD-10-CM

## 2021-12-03 DIAGNOSIS — R11.2 NAUSEA AND VOMITING, INTRACTABILITY OF VOMITING NOT SPECIFIED, UNSPECIFIED VOMITING TYPE: ICD-10-CM

## 2021-12-03 DIAGNOSIS — N93.9 VAGINAL BLEEDING: ICD-10-CM

## 2021-12-03 DIAGNOSIS — N94.6 DYSMENORRHEA: ICD-10-CM

## 2021-12-03 DIAGNOSIS — D64.9 ANEMIA, UNSPECIFIED TYPE: ICD-10-CM

## 2021-12-03 LAB
ABO + RH BLD: NORMAL
ALBUMIN SERPL BCP-MCNC: 3.3 G/DL (ref 3.5–5.2)
ALP SERPL-CCNC: 61 U/L (ref 55–135)
ALT SERPL W/O P-5'-P-CCNC: 13 U/L (ref 10–44)
ANION GAP SERPL CALC-SCNC: 7 MMOL/L (ref 8–16)
AST SERPL-CCNC: 15 U/L (ref 10–40)
B-HCG UR QL: NEGATIVE
BACTERIA GENITAL QL WET PREP: NORMAL
BASOPHILS # BLD AUTO: 0.04 K/UL (ref 0–0.2)
BASOPHILS NFR BLD: 0.5 % (ref 0–1.9)
BILIRUB SERPL-MCNC: 0.5 MG/DL (ref 0.1–1)
BILIRUB UR QL STRIP: NEGATIVE
BLD GP AB SCN CELLS X3 SERPL QL: NORMAL
BUN SERPL-MCNC: 13 MG/DL (ref 6–20)
CALCIUM SERPL-MCNC: 8.5 MG/DL (ref 8.7–10.5)
CHLORIDE SERPL-SCNC: 106 MMOL/L (ref 95–110)
CLARITY UR: CLEAR
CLUE CELLS VAG QL WET PREP: NORMAL
CO2 SERPL-SCNC: 24 MMOL/L (ref 23–29)
COLOR UR: YELLOW
CREAT SERPL-MCNC: 0.8 MG/DL (ref 0.5–1.4)
CREAT SERPL-MCNC: 0.9 MG/DL (ref 0.5–1.4)
CTP QC/QA: YES
DIFFERENTIAL METHOD: ABNORMAL
EOSINOPHIL # BLD AUTO: 0.2 K/UL (ref 0–0.5)
EOSINOPHIL NFR BLD: 2.3 % (ref 0–8)
ERYTHROCYTE [DISTWIDTH] IN BLOOD BY AUTOMATED COUNT: 13 % (ref 11.5–14.5)
EST. GFR  (AFRICAN AMERICAN): >60 ML/MIN/1.73 M^2
EST. GFR  (NON AFRICAN AMERICAN): >60 ML/MIN/1.73 M^2
FILAMENT FUNGI VAG WET PREP-#/AREA: NORMAL
GLUCOSE SERPL-MCNC: 108 MG/DL (ref 70–110)
GLUCOSE UR QL STRIP: NEGATIVE
HCT VFR BLD AUTO: 30.8 % (ref 37–48.5)
HGB BLD-MCNC: 10 G/DL (ref 12–16)
HGB UR QL STRIP: NEGATIVE
IMM GRANULOCYTES # BLD AUTO: 0.04 K/UL (ref 0–0.04)
IMM GRANULOCYTES NFR BLD AUTO: 0.5 % (ref 0–0.5)
INR PPP: 1
KETONES UR QL STRIP: NEGATIVE
LEUKOCYTE ESTERASE UR QL STRIP: NEGATIVE
LIPASE SERPL-CCNC: 35 U/L (ref 4–60)
LYMPHOCYTES # BLD AUTO: 1.4 K/UL (ref 1–4.8)
LYMPHOCYTES NFR BLD: 18.1 % (ref 18–48)
MCH RBC QN AUTO: 29.4 PG (ref 27–31)
MCHC RBC AUTO-ENTMCNC: 32.5 G/DL (ref 32–36)
MCV RBC AUTO: 91 FL (ref 82–98)
MONOCYTES # BLD AUTO: 0.5 K/UL (ref 0.3–1)
MONOCYTES NFR BLD: 6.3 % (ref 4–15)
NEUTROPHILS # BLD AUTO: 5.4 K/UL (ref 1.8–7.7)
NEUTROPHILS NFR BLD: 72.3 % (ref 38–73)
NITRITE UR QL STRIP: NEGATIVE
NRBC BLD-RTO: 0 /100 WBC
OB PNL STL: NEGATIVE
PH UR STRIP: 8 [PH] (ref 5–8)
PLATELET # BLD AUTO: 238 K/UL (ref 150–450)
PMV BLD AUTO: 9.8 FL (ref 9.2–12.9)
POTASSIUM SERPL-SCNC: 3.7 MMOL/L (ref 3.5–5.1)
PROT SERPL-MCNC: 6.6 G/DL (ref 6–8.4)
PROT UR QL STRIP: NEGATIVE
PROTHROMBIN TIME: 12.8 SEC (ref 11.4–13.7)
RBC # BLD AUTO: 3.4 M/UL (ref 4–5.4)
SAMPLE: NORMAL
SARS-COV-2 RDRP RESP QL NAA+PROBE: NEGATIVE
SODIUM SERPL-SCNC: 137 MMOL/L (ref 136–145)
SP GR UR STRIP: 1.02 (ref 1–1.03)
SPECIMEN SOURCE: NORMAL
T VAGINALIS GENITAL QL WET PREP: NORMAL
URN SPEC COLLECT METH UR: NORMAL
UROBILINOGEN UR STRIP-ACNC: NEGATIVE EU/DL
WBC # BLD AUTO: 7.52 K/UL (ref 3.9–12.7)
WBC #/AREA VAG WET PREP: NORMAL
YEAST GENITAL QL WET PREP: NORMAL

## 2021-12-03 PROCEDURE — 87491 CHLMYD TRACH DNA AMP PROBE: CPT | Performed by: STUDENT IN AN ORGANIZED HEALTH CARE EDUCATION/TRAINING PROGRAM

## 2021-12-03 PROCEDURE — 80053 COMPREHEN METABOLIC PANEL: CPT | Performed by: STUDENT IN AN ORGANIZED HEALTH CARE EDUCATION/TRAINING PROGRAM

## 2021-12-03 PROCEDURE — 63600175 PHARM REV CODE 636 W HCPCS: Performed by: NURSE PRACTITIONER

## 2021-12-03 PROCEDURE — 85025 COMPLETE CBC W/AUTO DIFF WBC: CPT | Performed by: STUDENT IN AN ORGANIZED HEALTH CARE EDUCATION/TRAINING PROGRAM

## 2021-12-03 PROCEDURE — 85610 PROTHROMBIN TIME: CPT | Performed by: STUDENT IN AN ORGANIZED HEALTH CARE EDUCATION/TRAINING PROGRAM

## 2021-12-03 PROCEDURE — 93005 ELECTROCARDIOGRAM TRACING: CPT | Performed by: INTERNAL MEDICINE

## 2021-12-03 PROCEDURE — 96361 HYDRATE IV INFUSION ADD-ON: CPT

## 2021-12-03 PROCEDURE — 96375 TX/PRO/DX INJ NEW DRUG ADDON: CPT

## 2021-12-03 PROCEDURE — 82272 OCCULT BLD FECES 1-3 TESTS: CPT | Performed by: STUDENT IN AN ORGANIZED HEALTH CARE EDUCATION/TRAINING PROGRAM

## 2021-12-03 PROCEDURE — 25500020 PHARM REV CODE 255: Performed by: STUDENT IN AN ORGANIZED HEALTH CARE EDUCATION/TRAINING PROGRAM

## 2021-12-03 PROCEDURE — 86900 BLOOD TYPING SEROLOGIC ABO: CPT | Performed by: STUDENT IN AN ORGANIZED HEALTH CARE EDUCATION/TRAINING PROGRAM

## 2021-12-03 PROCEDURE — 81025 URINE PREGNANCY TEST: CPT | Performed by: STUDENT IN AN ORGANIZED HEALTH CARE EDUCATION/TRAINING PROGRAM

## 2021-12-03 PROCEDURE — 87591 N.GONORRHOEAE DNA AMP PROB: CPT | Performed by: STUDENT IN AN ORGANIZED HEALTH CARE EDUCATION/TRAINING PROGRAM

## 2021-12-03 PROCEDURE — 87040 BLOOD CULTURE FOR BACTERIA: CPT | Mod: 59 | Performed by: STUDENT IN AN ORGANIZED HEALTH CARE EDUCATION/TRAINING PROGRAM

## 2021-12-03 PROCEDURE — 83690 ASSAY OF LIPASE: CPT | Performed by: STUDENT IN AN ORGANIZED HEALTH CARE EDUCATION/TRAINING PROGRAM

## 2021-12-03 PROCEDURE — U0002 COVID-19 LAB TEST NON-CDC: HCPCS | Performed by: STUDENT IN AN ORGANIZED HEALTH CARE EDUCATION/TRAINING PROGRAM

## 2021-12-03 PROCEDURE — 96372 THER/PROPH/DIAG INJ SC/IM: CPT | Mod: 59

## 2021-12-03 PROCEDURE — 93010 ELECTROCARDIOGRAM REPORT: CPT | Mod: ,,, | Performed by: INTERNAL MEDICINE

## 2021-12-03 PROCEDURE — 96376 TX/PRO/DX INJ SAME DRUG ADON: CPT

## 2021-12-03 PROCEDURE — 93010 EKG 12-LEAD: ICD-10-PCS | Mod: ,,, | Performed by: INTERNAL MEDICINE

## 2021-12-03 PROCEDURE — 81003 URINALYSIS AUTO W/O SCOPE: CPT | Performed by: STUDENT IN AN ORGANIZED HEALTH CARE EDUCATION/TRAINING PROGRAM

## 2021-12-03 PROCEDURE — 99285 EMERGENCY DEPT VISIT HI MDM: CPT | Mod: 25

## 2021-12-03 PROCEDURE — 87210 SMEAR WET MOUNT SALINE/INK: CPT | Performed by: STUDENT IN AN ORGANIZED HEALTH CARE EDUCATION/TRAINING PROGRAM

## 2021-12-03 PROCEDURE — 25000003 PHARM REV CODE 250: Performed by: STUDENT IN AN ORGANIZED HEALTH CARE EDUCATION/TRAINING PROGRAM

## 2021-12-03 PROCEDURE — 63600175 PHARM REV CODE 636 W HCPCS: Performed by: STUDENT IN AN ORGANIZED HEALTH CARE EDUCATION/TRAINING PROGRAM

## 2021-12-03 PROCEDURE — 96374 THER/PROPH/DIAG INJ IV PUSH: CPT | Mod: 59

## 2021-12-03 RX ORDER — ONDANSETRON 2 MG/ML
4 INJECTION INTRAMUSCULAR; INTRAVENOUS
Status: COMPLETED | OUTPATIENT
Start: 2021-12-03 | End: 2021-12-03

## 2021-12-03 RX ORDER — DICYCLOMINE HYDROCHLORIDE 20 MG/1
20 TABLET ORAL 2 TIMES DAILY
Qty: 20 TABLET | Refills: 0 | Status: SHIPPED | OUTPATIENT
Start: 2021-12-03 | End: 2022-01-02

## 2021-12-03 RX ORDER — MORPHINE SULFATE 4 MG/ML
4 INJECTION, SOLUTION INTRAMUSCULAR; INTRAVENOUS
Status: COMPLETED | OUTPATIENT
Start: 2021-12-03 | End: 2021-12-03

## 2021-12-03 RX ORDER — KETOROLAC TROMETHAMINE 30 MG/ML
15 INJECTION, SOLUTION INTRAMUSCULAR; INTRAVENOUS
Status: COMPLETED | OUTPATIENT
Start: 2021-12-03 | End: 2021-12-03

## 2021-12-03 RX ORDER — IBUPROFEN 600 MG/1
600 TABLET ORAL EVERY 6 HOURS PRN
Qty: 20 TABLET | Refills: 0 | OUTPATIENT
Start: 2021-12-03 | End: 2022-03-10

## 2021-12-03 RX ORDER — DICYCLOMINE HYDROCHLORIDE 10 MG/ML
20 INJECTION INTRAMUSCULAR
Status: COMPLETED | OUTPATIENT
Start: 2021-12-03 | End: 2021-12-03

## 2021-12-03 RX ORDER — ONDANSETRON 4 MG/1
4 TABLET, FILM COATED ORAL EVERY 6 HOURS
Qty: 12 TABLET | Refills: 0 | OUTPATIENT
Start: 2021-12-03 | End: 2022-03-10

## 2021-12-03 RX ADMIN — ONDANSETRON 4 MG: 2 INJECTION INTRAMUSCULAR; INTRAVENOUS at 09:12

## 2021-12-03 RX ADMIN — DICYCLOMINE HYDROCHLORIDE 20 MG: 10 INJECTION INTRAMUSCULAR at 12:12

## 2021-12-03 RX ADMIN — SODIUM CHLORIDE 500 ML: 0.9 INJECTION, SOLUTION INTRAVENOUS at 09:12

## 2021-12-03 RX ADMIN — MORPHINE SULFATE 4 MG: 4 INJECTION, SOLUTION INTRAMUSCULAR; INTRAVENOUS at 09:12

## 2021-12-03 RX ADMIN — MORPHINE SULFATE 4 MG: 4 INJECTION, SOLUTION INTRAMUSCULAR; INTRAVENOUS at 12:12

## 2021-12-03 RX ADMIN — KETOROLAC TROMETHAMINE 15 MG: 30 INJECTION, SOLUTION INTRAMUSCULAR; INTRAVENOUS at 12:12

## 2021-12-03 RX ADMIN — IOHEXOL 100 ML: 350 INJECTION, SOLUTION INTRAVENOUS at 10:12

## 2021-12-03 RX ADMIN — ONDANSETRON 4 MG: 2 INJECTION INTRAMUSCULAR; INTRAVENOUS at 12:12

## 2021-12-05 LAB
CHLAMYDIA, AMPLIFIED DNA: NEGATIVE
N GONORRHOEAE, AMPLIFIED DNA: NEGATIVE

## 2021-12-08 LAB
BACTERIA BLD CULT: NORMAL
BACTERIA BLD CULT: NORMAL

## 2021-12-09 ENCOUNTER — OFFICE VISIT (OUTPATIENT)
Dept: OBSTETRICS AND GYNECOLOGY | Facility: CLINIC | Age: 41
End: 2021-12-09

## 2021-12-09 VITALS
HEIGHT: 63 IN | BODY MASS INDEX: 51.91 KG/M2 | DIASTOLIC BLOOD PRESSURE: 78 MMHG | SYSTOLIC BLOOD PRESSURE: 124 MMHG | WEIGHT: 293 LBS

## 2021-12-09 DIAGNOSIS — Z12.4 SCREENING FOR CERVICAL CANCER: ICD-10-CM

## 2021-12-09 DIAGNOSIS — Z01.419 WELL WOMAN EXAM WITH ROUTINE GYNECOLOGICAL EXAM: Primary | ICD-10-CM

## 2021-12-09 DIAGNOSIS — E66.01 MORBID OBESITY WITH BMI OF 50.0-59.9, ADULT: ICD-10-CM

## 2021-12-09 DIAGNOSIS — Z12.31 ENCOUNTER FOR SCREENING MAMMOGRAM FOR MALIGNANT NEOPLASM OF BREAST: ICD-10-CM

## 2021-12-09 DIAGNOSIS — N83.202 LEFT OVARIAN CYST: ICD-10-CM

## 2021-12-09 PROCEDURE — 99999 PR PBB SHADOW E&M-EST. PATIENT-LVL III: ICD-10-PCS | Mod: PBBFAC,,, | Performed by: OBSTETRICS & GYNECOLOGY

## 2021-12-09 PROCEDURE — 99386 PR PREVENTIVE VISIT,NEW,40-64: ICD-10-PCS | Mod: S$PBB,,, | Performed by: OBSTETRICS & GYNECOLOGY

## 2021-12-09 PROCEDURE — 87624 HPV HI-RISK TYP POOLED RSLT: CPT | Performed by: OBSTETRICS & GYNECOLOGY

## 2021-12-09 PROCEDURE — 99999 PR PBB SHADOW E&M-EST. PATIENT-LVL III: CPT | Mod: PBBFAC,,, | Performed by: OBSTETRICS & GYNECOLOGY

## 2021-12-09 PROCEDURE — 99386 PREV VISIT NEW AGE 40-64: CPT | Mod: S$PBB,,, | Performed by: OBSTETRICS & GYNECOLOGY

## 2021-12-09 PROCEDURE — 88142 CYTOPATH C/V THIN LAYER: CPT | Performed by: OBSTETRICS & GYNECOLOGY

## 2021-12-09 PROCEDURE — 99213 OFFICE O/P EST LOW 20 MIN: CPT | Mod: PBBFAC | Performed by: OBSTETRICS & GYNECOLOGY

## 2021-12-09 RX ORDER — DICYCLOMINE HYDROCHLORIDE 20 MG/1
20 TABLET ORAL 4 TIMES DAILY
COMMUNITY
Start: 2021-11-30 | End: 2022-04-11

## 2021-12-09 RX ORDER — DOXYCYCLINE 100 MG/1
100 CAPSULE ORAL 2 TIMES DAILY
COMMUNITY
Start: 2021-10-25 | End: 2022-04-11

## 2021-12-14 ENCOUNTER — TELEPHONE (OUTPATIENT)
Dept: OBSTETRICS AND GYNECOLOGY | Facility: CLINIC | Age: 41
End: 2021-12-14

## 2021-12-16 LAB
HPV HR 12 DNA SPEC QL NAA+PROBE: POSITIVE
HPV16 AG SPEC QL: NEGATIVE
HPV18 DNA SPEC QL NAA+PROBE: NEGATIVE

## 2021-12-17 LAB
FINAL PATHOLOGIC DIAGNOSIS: NORMAL
Lab: NORMAL

## 2022-03-02 ENCOUNTER — TELEPHONE (OUTPATIENT)
Dept: ORTHOPEDICS | Facility: CLINIC | Age: 42
End: 2022-03-02

## 2022-03-02 NOTE — TELEPHONE ENCOUNTER
Left voicemail for patient to give us a call back in [facility] to confirming doctor.      ----- Message from Rajni Burton RN sent at 3/2/2022  9:29 AM CST -----  Regarding: RE: Workers Comp Appt  Justina-    Please verify MD pt is requesting.     Aditya Wallace-    I think I sent you this pt Monday but just in case. Also, I don't know the doctor the pt is requesting to see.     Thanks!    Rajni  ----- Message -----  From: Justina Hewitt MA  Sent: 3/2/2022   7:50 AM CST  To: Rajni Burton RN  Subject: FW: Workers Comp Appt                            Who is that? Is he still apart of ortho? Im just trying to make sure I get everything together before I call this patient and have them on hold forever.      ----- Message -----  From: Rajni Burton RN  Sent: 2/28/2022   5:14 PM CST  To: Justina Hewitt MA  Subject: RE: Workers Comp Appt                            Rodrigo Greene  ----- Message -----  From: Justina Hewitt MA  Sent: 2/28/2022   3:31 PM CST  To: Rajni Burton RN  Subject: FW: Workers Comp Appt                            Who does workers comp?      ----- Message -----  From: Yohana Aiken  Sent: 2/28/2022  10:10 AM CST  To: [facility] Clinical Support  Subject: Workers Comp Appt                                Regarding:Pt called to schedule for a fracture on left shoulder pt wants to see a Doctor Tae Sterling. Pt stated she has workmens comp. [employer] hard stop.       Call Back number: [phone]

## 2022-03-03 ENCOUNTER — TELEPHONE (OUTPATIENT)
Dept: ORTHOPEDICS | Facility: CLINIC | Age: 42
End: 2022-03-03

## 2022-03-03 NOTE — TELEPHONE ENCOUNTER
I contacted the patient and asked them to please call [phone] in order to start the process with Occupational Health.       Patient needs to call [facility] at [phone] for an appointment with them prior to seeing us.

## 2022-03-07 ENCOUNTER — TELEPHONE (OUTPATIENT)
Dept: URGENT CARE | Facility: CLINIC | Age: 42
End: 2022-03-07

## 2022-03-07 NOTE — TELEPHONE ENCOUNTER
I spoke with the patient regarding her workers comp claim.  She will email the [facility] the forms she has including  information.

## 2022-03-10 ENCOUNTER — HOSPITAL ENCOUNTER (EMERGENCY)
Facility: HOSPITAL | Age: 42
Discharge: HOME | End: 2022-03-10
Attending: EMERGENCY MEDICINE
Payer: MEDICAID

## 2022-03-10 VITALS
TEMPERATURE: 98 F | HEART RATE: 99 BPM | HEIGHT: 63 IN | DIASTOLIC BLOOD PRESSURE: 55 MMHG | BODY MASS INDEX: 49.61 KG/M2 | SYSTOLIC BLOOD PRESSURE: 107 MMHG | RESPIRATION RATE: 19 BRPM | OXYGEN SATURATION: 99 % | WEIGHT: 280 LBS

## 2022-03-10 DIAGNOSIS — R11.10 VOMITING: ICD-10-CM

## 2022-03-10 DIAGNOSIS — R06.00 DYSPNEA: ICD-10-CM

## 2022-03-10 DIAGNOSIS — L03.115 CELLULITIS OF RIGHT LOWER EXTREMITY: ICD-10-CM

## 2022-03-10 DIAGNOSIS — I89.0 LYMPHEDEMA: Primary | ICD-10-CM

## 2022-03-10 DIAGNOSIS — R60.9 SWELLING: ICD-10-CM

## 2022-03-10 LAB
ALBUMIN SERPL BCP-MCNC: 3.7 G/DL (ref 3.5–5.2)
ALP SERPL-CCNC: 92 U/L (ref 55–135)
ALT SERPL W/O P-5'-P-CCNC: 39 U/L (ref 10–44)
ANION GAP SERPL CALC-SCNC: 9 MMOL/L (ref 8–16)
AST SERPL-CCNC: 30 U/L (ref 10–40)
B-HCG UR QL: NEGATIVE
BACTERIA #/AREA URNS HPF: ABNORMAL /HPF
BASOPHILS # BLD AUTO: 0.03 K/UL (ref 0–0.2)
BASOPHILS NFR BLD: 0.2 % (ref 0–1.9)
BILIRUB SERPL-MCNC: 1.2 MG/DL (ref 0.1–1)
BILIRUB UR QL STRIP: NEGATIVE
BUN SERPL-MCNC: 12 MG/DL (ref 6–20)
CALCIUM SERPL-MCNC: 8.8 MG/DL (ref 8.7–10.5)
CHLORIDE SERPL-SCNC: 103 MMOL/L (ref 95–110)
CLARITY UR: ABNORMAL
CO2 SERPL-SCNC: 20 MMOL/L (ref 23–29)
COLOR UR: YELLOW
CREAT SERPL-MCNC: 1 MG/DL (ref 0.5–1.4)
CTP QC/QA: YES
DIFFERENTIAL METHOD: ABNORMAL
EOSINOPHIL # BLD AUTO: 0 K/UL (ref 0–0.5)
EOSINOPHIL NFR BLD: 0.1 % (ref 0–8)
ERYTHROCYTE [DISTWIDTH] IN BLOOD BY AUTOMATED COUNT: 14.3 % (ref 11.5–14.5)
EST. GFR  (AFRICAN AMERICAN): >60 ML/MIN/1.73 M^2
EST. GFR  (NON AFRICAN AMERICAN): >60 ML/MIN/1.73 M^2
GLUCOSE SERPL-MCNC: 105 MG/DL (ref 70–110)
GLUCOSE UR QL STRIP: NEGATIVE
HCT VFR BLD AUTO: 35.4 % (ref 37–48.5)
HGB BLD-MCNC: 11.3 G/DL (ref 12–16)
HGB UR QL STRIP: ABNORMAL
HYALINE CASTS #/AREA URNS LPF: 44 /LPF
IMM GRANULOCYTES # BLD AUTO: 0.13 K/UL (ref 0–0.04)
IMM GRANULOCYTES NFR BLD AUTO: 0.8 % (ref 0–0.5)
INFLUENZA A, MOLECULAR: NEGATIVE
INFLUENZA B, MOLECULAR: NEGATIVE
KETONES UR QL STRIP: ABNORMAL
LEUKOCYTE ESTERASE UR QL STRIP: ABNORMAL
LIPASE SERPL-CCNC: 31 U/L (ref 4–60)
LYMPHOCYTES # BLD AUTO: 0.8 K/UL (ref 1–4.8)
LYMPHOCYTES NFR BLD: 4.6 % (ref 18–48)
MCH RBC QN AUTO: 27.6 PG (ref 27–31)
MCHC RBC AUTO-ENTMCNC: 31.9 G/DL (ref 32–36)
MCV RBC AUTO: 86 FL (ref 82–98)
MICROSCOPIC COMMENT: ABNORMAL
MONOCYTES # BLD AUTO: 0.9 K/UL (ref 0.3–1)
MONOCYTES NFR BLD: 5.5 % (ref 4–15)
NEUTROPHILS # BLD AUTO: 14.4 K/UL (ref 1.8–7.7)
NEUTROPHILS NFR BLD: 88.8 % (ref 38–73)
NITRITE UR QL STRIP: NEGATIVE
NRBC BLD-RTO: 0 /100 WBC
PH UR STRIP: 6 [PH] (ref 5–8)
PLATELET # BLD AUTO: 207 K/UL (ref 150–450)
PMV BLD AUTO: 10.4 FL (ref 9.2–12.9)
POTASSIUM SERPL-SCNC: 3.8 MMOL/L (ref 3.5–5.1)
PROT SERPL-MCNC: 7.9 G/DL (ref 6–8.4)
PROT UR QL STRIP: ABNORMAL
RBC # BLD AUTO: 4.1 M/UL (ref 4–5.4)
RBC #/AREA URNS HPF: 7 /HPF (ref 0–4)
SARS-COV-2 RDRP RESP QL NAA+PROBE: NEGATIVE
SODIUM SERPL-SCNC: 132 MMOL/L (ref 136–145)
SP GR UR STRIP: >1.03 (ref 1–1.03)
SPECIMEN SOURCE: NORMAL
SQUAMOUS #/AREA URNS HPF: 24 /HPF
URN SPEC COLLECT METH UR: ABNORMAL
UROBILINOGEN UR STRIP-ACNC: ABNORMAL EU/DL
WBC # BLD AUTO: 16.18 K/UL (ref 3.9–12.7)
WBC #/AREA URNS HPF: 18 /HPF (ref 0–5)

## 2022-03-10 PROCEDURE — 87086 URINE CULTURE/COLONY COUNT: CPT | Performed by: NURSE PRACTITIONER

## 2022-03-10 PROCEDURE — 81025 URINE PREGNANCY TEST: CPT | Performed by: NURSE PRACTITIONER

## 2022-03-10 PROCEDURE — 99285 EMERGENCY DEPT VISIT HI MDM: CPT | Mod: 25

## 2022-03-10 PROCEDURE — 93010 EKG 12-LEAD: ICD-10-PCS | Mod: ,,, | Performed by: INTERNAL MEDICINE

## 2022-03-10 PROCEDURE — 80053 COMPREHEN METABOLIC PANEL: CPT | Performed by: NURSE PRACTITIONER

## 2022-03-10 PROCEDURE — 83690 ASSAY OF LIPASE: CPT | Performed by: NURSE PRACTITIONER

## 2022-03-10 PROCEDURE — 81001 URINALYSIS AUTO W/SCOPE: CPT | Performed by: NURSE PRACTITIONER

## 2022-03-10 PROCEDURE — 93005 ELECTROCARDIOGRAM TRACING: CPT | Performed by: INTERNAL MEDICINE

## 2022-03-10 PROCEDURE — 25000003 PHARM REV CODE 250: Performed by: NURSE PRACTITIONER

## 2022-03-10 PROCEDURE — 63600175 PHARM REV CODE 636 W HCPCS: Performed by: NURSE PRACTITIONER

## 2022-03-10 PROCEDURE — 85025 COMPLETE CBC W/AUTO DIFF WBC: CPT | Performed by: NURSE PRACTITIONER

## 2022-03-10 PROCEDURE — 96361 HYDRATE IV INFUSION ADD-ON: CPT

## 2022-03-10 PROCEDURE — 96376 TX/PRO/DX INJ SAME DRUG ADON: CPT

## 2022-03-10 PROCEDURE — 93010 ELECTROCARDIOGRAM REPORT: CPT | Mod: ,,, | Performed by: INTERNAL MEDICINE

## 2022-03-10 PROCEDURE — 87502 INFLUENZA DNA AMP PROBE: CPT | Performed by: NURSE PRACTITIONER

## 2022-03-10 PROCEDURE — 96365 THER/PROPH/DIAG IV INF INIT: CPT | Mod: 59

## 2022-03-10 PROCEDURE — U0002 COVID-19 LAB TEST NON-CDC: HCPCS | Performed by: NURSE PRACTITIONER

## 2022-03-10 PROCEDURE — 96375 TX/PRO/DX INJ NEW DRUG ADDON: CPT

## 2022-03-10 PROCEDURE — 25500020 PHARM REV CODE 255: Performed by: EMERGENCY MEDICINE

## 2022-03-10 RX ORDER — TRAMADOL HYDROCHLORIDE 50 MG/1
50 TABLET ORAL 3 TIMES DAILY PRN
Status: ON HOLD | COMMUNITY
Start: 2022-02-25 | End: 2022-03-16 | Stop reason: HOSPADM

## 2022-03-10 RX ORDER — LIDOCAINE 50 MG/G
1 PATCH TOPICAL EVERY 12 HOURS
COMMUNITY
Start: 2022-02-25 | End: 2022-04-11

## 2022-03-10 RX ORDER — DICLOFENAC SODIUM 25 MG/1
25 TABLET, DELAYED RELEASE ORAL 2 TIMES DAILY
COMMUNITY
Start: 2021-11-18 | End: 2022-03-12

## 2022-03-10 RX ORDER — SUMATRIPTAN SUCCINATE 25 MG/1
25 TABLET ORAL
COMMUNITY
End: 2022-03-12

## 2022-03-10 RX ORDER — DROPERIDOL 2.5 MG/ML
2.5 INJECTION, SOLUTION INTRAMUSCULAR; INTRAVENOUS
Status: COMPLETED | OUTPATIENT
Start: 2022-03-10 | End: 2022-03-10

## 2022-03-10 RX ORDER — NAPROXEN 500 MG/1
500 TABLET ORAL 2 TIMES DAILY
COMMUNITY
Start: 2022-02-22 | End: 2022-04-11

## 2022-03-10 RX ORDER — CEPHALEXIN 500 MG/1
CAPSULE ORAL
COMMUNITY
End: 2022-03-12

## 2022-03-10 RX ORDER — DICYCLOMINE HYDROCHLORIDE 20 MG/1
20 TABLET ORAL 2 TIMES DAILY
Qty: 20 TABLET | Refills: 0 | Status: SHIPPED | OUTPATIENT
Start: 2022-03-10 | End: 2022-03-20

## 2022-03-10 RX ORDER — ONDANSETRON 2 MG/ML
4 INJECTION INTRAMUSCULAR; INTRAVENOUS
Status: DISCONTINUED | OUTPATIENT
Start: 2022-03-10 | End: 2022-03-10

## 2022-03-10 RX ORDER — TIZANIDINE 4 MG/1
4 TABLET ORAL NIGHTLY
COMMUNITY
Start: 2022-02-25

## 2022-03-10 RX ORDER — DOXYCYCLINE 100 MG/1
100 CAPSULE ORAL 2 TIMES DAILY
Qty: 20 CAPSULE | Refills: 0 | Status: ON HOLD | OUTPATIENT
Start: 2022-03-10 | End: 2022-03-16 | Stop reason: HOSPADM

## 2022-03-10 RX ORDER — ONDANSETRON 4 MG/1
TABLET, ORALLY DISINTEGRATING ORAL
COMMUNITY
End: 2022-03-10

## 2022-03-10 RX ORDER — HYDROCODONE BITARTRATE AND ACETAMINOPHEN 5; 325 MG/1; MG/1
1 TABLET ORAL EVERY 4 HOURS PRN
COMMUNITY
Start: 2021-10-25 | End: 2022-03-12

## 2022-03-10 RX ORDER — TRAZODONE HYDROCHLORIDE 100 MG/1
100 TABLET ORAL NIGHTLY
COMMUNITY

## 2022-03-10 RX ORDER — FLUTICASONE PROPIONATE 50 MCG
1 SPRAY, SUSPENSION (ML) NASAL DAILY
COMMUNITY

## 2022-03-10 RX ORDER — AMITRIPTYLINE HYDROCHLORIDE 25 MG/1
25 TABLET, FILM COATED ORAL NIGHTLY
COMMUNITY
Start: 2021-11-18 | End: 2022-11-18

## 2022-03-10 RX ORDER — ONDANSETRON 2 MG/ML
4 INJECTION INTRAMUSCULAR; INTRAVENOUS
Status: COMPLETED | OUTPATIENT
Start: 2022-03-10 | End: 2022-03-10

## 2022-03-10 RX ORDER — ONDANSETRON 4 MG/1
4 TABLET, ORALLY DISINTEGRATING ORAL EVERY 8 HOURS PRN
Qty: 15 TABLET | Refills: 0 | Status: ON HOLD | OUTPATIENT
Start: 2022-03-10 | End: 2022-03-16 | Stop reason: HOSPADM

## 2022-03-10 RX ADMIN — SODIUM CHLORIDE 1000 ML: 0.9 INJECTION, SOLUTION INTRAVENOUS at 09:03

## 2022-03-10 RX ADMIN — DROPERIDOL 2.5 MG: 2.5 INJECTION, SOLUTION INTRAMUSCULAR; INTRAVENOUS at 09:03

## 2022-03-10 RX ADMIN — ONDANSETRON 4 MG: 2 INJECTION INTRAMUSCULAR; INTRAVENOUS at 09:03

## 2022-03-10 RX ADMIN — IOHEXOL 100 ML: 350 INJECTION, SOLUTION INTRAVENOUS at 10:03

## 2022-03-10 RX ADMIN — ONDANSETRON 4 MG: 2 INJECTION INTRAMUSCULAR; INTRAVENOUS at 10:03

## 2022-03-10 RX ADMIN — CEFTRIAXONE 1 G: 1 INJECTION, SOLUTION INTRAVENOUS at 09:03

## 2022-03-10 NOTE — ED PROVIDER NOTES
"Encounter Date: 3/10/2022       History     Chief Complaint   Patient presents with    Vomiting    Fever     m    Headache     Multiple other complants onset 2 days ago     Patient is a 41 year old female with history of anxiety, depression, and migraine. Surgical history of cholecystectomy and reconstruction repair to right lower extremity fracture that she states was a result after a fireplace mantle fell on her leg at the age of 4 years old. She recently had an injury to left upper extremity after "hitting a pole at work", she is currently awaiting a f/u with orthopedic in [place]. She presents today with complaints of chills and feelings of cold and hot for the past 3 days. She states "she feels cold inside, but body feels warm on the outside". She reports episodes of nausea, vomiting, abdominal cramping, and diarrhea that started last night after eating chinese and states that she "just doesn't feel good". Patients lower extremities noted to have edema with significant swelling and redness to RLE. Patient and significant other states that they both just notice the changes to the RLE and that she doesn't remember it being like this yesterday. She states that she is suppose to be wearing compression stockings, but has not felt like putting them on. Reports pain to upper, inner thigh, and calf of RLE. Reports mild shortness of breath with exertion. Denies chest pain.     The history is provided by the patient, the spouse and medical records.     Review of patient's allergies indicates:  No Known Allergies  Past Medical History:   Diagnosis Date    Anxiety     Depression     Migraine headache     Morbid obesity     Renal disorder     kidney stone     Past Surgical History:   Procedure Laterality Date    CHOLECYSTECTOMY      FRACTURE SURGERY  1984    right lower leg reconstruction surgery s/p trauma injury    TUBAL LIGATION       Family History   Problem Relation Age of Onset    Heart disease Mother  "     Social History     Tobacco Use    Smoking status: Current Every Day Smoker     Packs/day: 1.00     Types: Cigarettes    Smokeless tobacco: Never Used   Substance Use Topics    Alcohol use: No    Drug use: Never     Review of Systems   Constitutional: Positive for chills and fever. Negative for activity change and appetite change.   HENT: Negative for sneezing and sore throat.    Eyes: Negative.    Respiratory: Positive for shortness of breath. Negative for cough, wheezing and stridor.    Cardiovascular: Positive for leg swelling. Negative for chest pain.   Gastrointestinal: Positive for abdominal pain, diarrhea, nausea and vomiting. Negative for abdominal distention, blood in stool and constipation.   Endocrine: Positive for cold intolerance and heat intolerance.   Genitourinary: Positive for dysuria and urgency. Negative for difficulty urinating and flank pain.   Musculoskeletal: Positive for arthralgias (pain to LUE and RLE ) and gait problem. Negative for back pain.   Skin: Negative.  Negative for rash.   Allergic/Immunologic: Negative.    Neurological: Positive for weakness and headaches. Negative for dizziness and light-headedness.   Hematological: Negative.  Does not bruise/bleed easily.   Psychiatric/Behavioral: Negative for behavioral problems, confusion and suicidal ideas. The patient is nervous/anxious.    All other systems reviewed and are negative.      Physical Exam     Initial Vitals [03/10/22 0806]   BP Pulse Resp Temp SpO2   129/76 107 16 100.2 °F (37.9 °C) 100 %      MAP       --         Physical Exam    Nursing note and vitals reviewed.  Constitutional: Vital signs are normal. She appears well-developed and well-nourished. She is Obese . She is cooperative. No distress.   HENT:   Head: Normocephalic and atraumatic.   Mouth/Throat: Uvula is midline, oropharynx is clear and moist and mucous membranes are normal.   Eyes: Conjunctivae, EOM and lids are normal. Pupils are equal, round, and  reactive to light.   Neck: Trachea normal and phonation normal. Neck supple. No JVD present.   Normal range of motion.  Cardiovascular: Regular rhythm, S1 normal, S2 normal, normal heart sounds and intact distal pulses. Tachycardia present.    Pulses:       Radial pulses are 2+ on the right side and 2+ on the left side.        Dorsalis pedis pulses are 1+ on the right side.        Posterior tibial pulses are 1+ on the right side.   Pulmonary/Chest: Effort normal and breath sounds normal. No stridor. No respiratory distress. She has no wheezes.   Abdominal: Abdomen is soft. Bowel sounds are normal. She exhibits no distension and no mass. There is abdominal tenderness in the periumbilical area and suprapubic area. There is no rebound and no guarding.   Musculoskeletal:      Cervical back: Normal range of motion and neck supple.      Right lower leg: Tenderness present. 3+ Edema present.      Left lower leg: No tenderness. 2+ Edema present.     Neurological: She is alert and oriented to person, place, and time. She has normal strength. No sensory deficit. GCS eye subscore is 4. GCS verbal subscore is 5. GCS motor subscore is 6.   Skin: Skin is warm, dry and intact. Capillary refill takes more than 3 seconds. There is erythema (RLE ). No pallor.   Psychiatric: Her speech is normal and behavior is normal. Judgment and thought content normal. Her mood appears anxious. Cognition and memory are normal.         ED Course   Procedures  Labs Reviewed   URINALYSIS, REFLEX TO URINE CULTURE - Abnormal; Notable for the following components:       Result Value    Appearance, UA Hazy (*)     Specific Gravity, UA >1.030 (*)     Protein, UA 1+ (*)     Ketones, UA Trace (*)     Occult Blood UA 1+ (*)     Urobilinogen, UA 2.0-3.0 (*)     Leukocytes, UA Trace (*)     All other components within normal limits    Narrative:     Specimen Source->Urine   CBC W/ AUTO DIFFERENTIAL - Abnormal; Notable for the following components:    WBC 16.18  (*)     Hemoglobin 11.3 (*)     Hematocrit 35.4 (*)     MCHC 31.9 (*)     Immature Granulocytes 0.8 (*)     Gran # (ANC) 14.4 (*)     Immature Grans (Abs) 0.13 (*)     Lymph # 0.8 (*)     Gran % 88.8 (*)     Lymph % 4.6 (*)     All other components within normal limits   COMPREHENSIVE METABOLIC PANEL - Abnormal; Notable for the following components:    Sodium 132 (*)     CO2 20 (*)     Total Bilirubin 1.2 (*)     All other components within normal limits   URINALYSIS MICROSCOPIC - Abnormal; Notable for the following components:    RBC, UA 7 (*)     WBC, UA 18 (*)     Hyaline Casts, UA 44 (*)     All other components within normal limits    Narrative:     Specimen Source->Urine   CULTURE, URINE   INFLUENZA A AND B ANTIGEN    Narrative:     Specimen Source->Nasopharyngeal Swab   SARS-COV-2 RNA AMPLIFICATION, QUAL   LIPASE   POCT URINE PREGNANCY        ECG Results          EKG 12-lead (In process)  Result time 03/10/22 09:55:46    In process by Interface, Lab In [facility] (03/10/22 09:55:46)                 Narrative:    Test Reason : R11.10,    Vent. Rate : 095 BPM     Atrial Rate : 095 BPM     P-R Int : 148 ms          QRS Dur : 088 ms      QT Int : 326 ms       P-R-T Axes : 064 -05 010 degrees     QTc Int : 409 ms    Normal sinus rhythm  Normal ECG  When compared with ECG of 03-DEC-2021 08:39,  No significant change was found    Referred By: [email or web address]   SELF           Confirmed By:                             Imaging Results          CT Abdomen Pelvis With Contrast (Final result)  Result time 03/10/22 11:15:27    Final result by Shawn Lacy MD (03/10/22 11:15:27)                 Narrative:    CMS MANDATED QUALITY DATA-CT RADIATION DOSE-436  All CT scans at this facility use dose modulation, iterative reconstruction, and or weight-based dosing when appropriate to reduce radiation dose to as low as reasonably achievable.    HISTORY: Abdominal pain, acute, nonlocalized  vomiting.    FINDINGS: Axial postcontrast  imaging was performed with 100 mL Omnipaque 350 IV contrast. Comparison to the CT of 12/03/2021.    CT ABDOMEN: The lung bases are clear. Within the limitations of the exam due to beam hardening artifact and poor penetration from large body habitus, the solid organs enhance normally. There are cholecystectomy clips, with unchanged 25 mm left adrenal nodule. Nodular density in the left upper quadrant consistent with splenule is unchanged.    The abdominal aorta and iliac arteries enhance normally and are normal in caliber, with no mesenteric or abdominal retroperitoneal lymph node enlargement. There is no bowel wall thickening, inflammation, or bowel obstruction. The appendix is normal. There is no ascites or intraperitoneal free air.    CT PELVIS: The sigmoid colon, rectum, uterus, adnexa, urinary bladder and pelvic vasculature enhance normally. There is no pelvic free fluid or mass.    There are several prominent to mildly enlarged right external iliac chain lymph nodes measuring up to 12 mm short axis dimension, new from the prior exam. Additionally, there are multiple enlarged right inguinal lymph nodes measuring up to 25 mm short axis dimension, also new from the prior exam. The musculature enhances normally. There are no acute fractures or destructive osseous lesions.    IMPRESSION:  1. Several enlarged right iliac chain and right inguinal lymph nodes, which are new and nonspecific. Reactive lymphadenopathy from right lower extremity infectious or inflammatory process, metastatic disease, or lymphoproliferative disorder such as lymphoma could all have this appearance. Consider clinical or imaging follow-up, versus biopsy for tissue diagnosis, as clinically indicated.  2. Otherwise no acute findings in the abdomen or pelvis.    Electronically signed by:  Shawn Lacy MD  3/10/2022 11:15 AM CST Workstation: [id number]                             US Lower Extremity Veins Right (Final result)  Result time 03/10/22  10:15:04    Final result by Bryan Galvez MD (03/10/22 10:15:04)                 Narrative:    VENOUS DOPPLER ULTRASOUND RIGHT LOWER EXTREMITY    CLINICAL DATA: Right leg swelling    FINDINGS: Color and duplex Doppler sonographic assessment with spectral analysis of the right lower extremity demonstrates no evidence of deep vein thrombosis. Normal color Doppler flow, augmentation, and compressibility are noted at all levels.    Prominent right inguinal lymph node measures 5.4 x 2.0 cm.    IMPRESSION:  1. No evidence of DVT in the right lower extremity.  2. Prominent right inguinal lymph node, possibly reactive, but indeterminate. Clinical or sonographic follow-up could be utilized.    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    COMPARISON:  None available.    FINDINGS:  The lungs are clear. Costophrenic angles are seen without effusion. No pneumothorax is identified. The heart is normal in size. The mediastinum is within normal limits. Osseous structures appear within normal limits. The visualized upper abdomen is unremarkable.    IMPRESSION:  No acute cardiac or pulmonary process.                  .            Electronically signed by:  Sai Joshi MD  3/10/2022 9:02 AM CST Workstation: [id number]                            X-Rays:   Independently Interpreted Readings:   Other Readings:  Reviewed by me, read by Radiology    Medications   sodium chloride 0.9% bolus 1,000 mL (0 mLs Intravenous Stopped 3/10/22 1021)   droperidoL injection 2.5 mg (2.5 mg Intravenous Given 3/10/22 0930)   cefTRIAXone (ROCEPHIN) 1 g/50 mL D5W IVPB (0 g Intravenous  Stopped 3/10/22 1041)   iohexoL (OMNIPAQUE 350) injection 100 mL (100 mLs Intravenous Given 3/10/22 1054)   ondansetron injection 4 mg (4 mg Intravenous Given 3/10/22 1025)     Medical Decision Making:   Initial Assessment:   Emergent evaluation of a 42 yo female presenting for fever, nausea, vomiting, chills, dysuria and right lower extremity pain and swelling.  Patient states symptoms initially began 2 days ago but worsened last night.  Patient's  states he had a fever of 101 at 2:00 a.m. this morning but was unable to tolerate any Tylenol or ibuprofen without vomiting.  Patient is unsure of any sick contacts..  On exam pt is A&Ox3. VSS. Nonfebrile and nontoxic appearing.  Patient appears anxious on exam.  Breath sounds clear bilaterally. Mucous membranes pink and moist. Abdomen soft with generalized lower abdominal pain to palpation. No rebound or guarding appreciated on exam.   BS WNL.  Pt speaking in full sentences.  Swelling noted to bilateral lower extremities with swelling being worse in right lower extremity.  Ecchymosis and erythema noted to right calf.  Positive Homans sign.  Cap refill < 3 seconds.      Differential Diagnosis:   Differential diagnoses include but are not limited to influenza, COVID, viral illness, colitis, gastroenteritis, UTI, pyelonephritis, cellulitis, lymphedema, others.      Independently Interpreted Test(s):   I have ordered and independently interpreted X-rays - see prior notes.  I have ordered and independently interpreted EKG Reading(s) - see prior notes  Clinical Tests:   Lab Tests: Ordered and Reviewed  The following lab test(s) were unremarkable: CBC, CMP, Urinalysis, UPT and Lipase  Radiological Study: Ordered and Reviewed  Medical Tests: Reviewed and Ordered  ED Management:  I will get labs, imaging, medicate, hydrate and reassess.  I discussed this case with my supervising physician.             ED Course as of 03/10/22 1208   Thu Mar 10, 2022   0911 Urine preg  negative.  Influenza negative.  COVID negative.  Chest x-ray negative for any acute infectious process.  UA shows trace leukocytes but greater than 18 wbc's.  Will treat for mild UTI.  Awaiting further labs. [RZ]   1023 CBC shows slight leukocytosis of 16.18.  H&H stable at 11.3 and 35.4.  CMP unremarkable.  Lipase negative.  Ultrasound negative for any DVT.  Patient covered with Rocephin and will be discharged with Keflex for UTI and cellulitis.  Awaiting CT abdomen [RZ]   1142 CT shows reactive lymph nodes on right side.  Concern for reactive lymphadenopathy from right lower extremity infectious or inflammatory process, metastatic disease, or lymphoproliferative disorder.  Patient and  updated on imaging results.  We have scheduled her an appointment for Access help to establish care and further workup once cellulitis has been treated.  Strict return to ED precautions discussed.  Patient and  verbalized understanding of this plan of care.  All questions and concerns addressed. [RZ]   1200 Patient is hemodynamically stable, vital signs are normal. Discharge instructions given. Return to ED precautions discussed. Follow up as directed. Pt verbalized understanding of this plan.  Pt is stable for discharge.  [RZ]      ED Course User Index  [RZ] Jina Florian NP             Clinical Impression:   Final diagnoses:  [R60.9] Swelling  [R06.00] Dyspnea  [R11.10] Vomiting  [I89.0] Lymphedema (Primary)  [L03.115] Cellulitis of right lower extremity          ED Disposition Condition    Discharge Stable        ED Prescriptions     Medication Sig Dispense Start Date End Date Auth. Provider    doxycycline (VIBRAMYCIN) 100 MG Cap Take 1 capsule (100 mg total) by mouth 2 (two) times daily. for 10 days 20 capsule 3/10/2022 3/20/2022 Jina Florian NP    ondansetron (ZOFRAN-ODT) 4 MG TbDL Take 1 tablet (4 mg total) by mouth every 8 (eight) hours as needed (nausea). 15 tablet 3/10/2022 3/15/2022 Jina Florian NP     dicyclomine (BENTYL) 20 mg tablet Take 1 tablet (20 mg total) by mouth 2 (two) times daily. for 10 days 20 tablet 3/10/2022 3/20/2022 Jina Florian NP        Follow-up Information     Follow up With Specialties Details Why Contact Info    [facility]  Schedule an appointment as soon as possible for a visit on 4/12/2022 as scheduled at 0930 [address] [facility] [address]  [phone]             Jina Florian NP  03/10/22 1201

## 2022-03-10 NOTE — ED NOTES
Attempted IV access and blood draw X 2 without success. Patient is a difficult stick. Awaiting an RN for ultrasound guided peripheral IV placement at this time.

## 2022-03-10 NOTE — DISCHARGE INSTRUCTIONS
Your labs show that you have a mild urinary tract infection.  You also clinically have a cellulitis to that right lower leg.  We are going to treat you with antibiotics.  Your CT scan does show some reactive lymph nodes but this could be due to your cellulitis.  Please follow-up with your PCP for further evaluation workup due to enlarged lymph nodes and possible concern for lymphoma.  We have prescribed you Zofran for your nausea and Bentyl for abdominal cramping.  Maintain movement and stretches.  Follow up to establish care with a PCP.    Our goal in the emergency department is to always give you outstanding care and exceptional service. You may receive a survey by mail or e-mail in the next week regarding your experience in our ED. We would greatly appreciate your completing and returning the survey. Your feedback provides us with a way to recognize our staff who give very good care and it helps us learn how to improve when your experience was below our aspiration of excellence.

## 2022-03-10 NOTE — ED NOTES
Cleo Ray PA-C           Patient refused to use wheelchair at discharge, insisting she would walk and has no issues doing so. Patients family member is with her.

## 2022-03-10 NOTE — ED NOTES
Patient states had a fever of 101 orally this morning at 2AM and took some tylenol for it, but has been vomiting and threw it up. Patient c/o pain to the right lower leg, from the right knee, down to right foot. Patient was unaware of any redness or swelling to the area as she did not examine the area at home. However, when she got undressed and placed the hospital gown on, she noticed the swelling and redness that is worse on the calf area. There is no open wounds, no drainage, patient does not remember injuring the area in any way.

## 2022-03-11 LAB
BACTERIA UR CULT: NORMAL
BACTERIA UR CULT: NORMAL

## 2022-03-12 ENCOUNTER — HOSPITAL ENCOUNTER (INPATIENT)
Facility: HOSPITAL | Age: 42
LOS: 2 days | Discharge: HOME | DRG: 603 | End: 2022-03-16
Attending: EMERGENCY MEDICINE | Admitting: INTERNAL MEDICINE

## 2022-03-12 DIAGNOSIS — I89.0 CHRONIC ACQUIRED LYMPHEDEMA: ICD-10-CM

## 2022-03-12 DIAGNOSIS — E66.01 MORBID OBESITY: ICD-10-CM

## 2022-03-12 DIAGNOSIS — L03.115 CELLULITIS OF RIGHT LEG: Primary | ICD-10-CM

## 2022-03-12 DIAGNOSIS — L03.116 CELLULITIS OF LEG, LEFT: ICD-10-CM

## 2022-03-12 DIAGNOSIS — L03.115 CELLULITIS OF RIGHT LOWER EXTREMITY: ICD-10-CM

## 2022-03-12 LAB
ALBUMIN SERPL BCP-MCNC: 3.2 G/DL (ref 3.5–5.2)
ALP SERPL-CCNC: 89 U/L (ref 55–135)
ALT SERPL W/O P-5'-P-CCNC: 30 U/L (ref 10–44)
ANION GAP SERPL CALC-SCNC: 9 MMOL/L (ref 8–16)
AST SERPL-CCNC: 22 U/L (ref 10–40)
BACTERIA #/AREA URNS HPF: NEGATIVE /HPF
BASOPHILS # BLD AUTO: 0.04 K/UL (ref 0–0.2)
BASOPHILS NFR BLD: 0.4 % (ref 0–1.9)
BILIRUB SERPL-MCNC: 0.5 MG/DL (ref 0.1–1)
BILIRUB UR QL STRIP: NEGATIVE
BUN SERPL-MCNC: 14 MG/DL (ref 6–20)
CALCIUM SERPL-MCNC: 8.6 MG/DL (ref 8.7–10.5)
CHLORIDE SERPL-SCNC: 106 MMOL/L (ref 95–110)
CK SERPL-CCNC: 101 U/L (ref 20–180)
CLARITY UR: CLEAR
CO2 SERPL-SCNC: 24 MMOL/L (ref 23–29)
COLOR UR: YELLOW
CREAT SERPL-MCNC: 1 MG/DL (ref 0.5–1.4)
CREAT SERPL-MCNC: 1 MG/DL (ref 0.5–1.4)
CRP SERPL-MCNC: 16.62 MG/DL
DIFFERENTIAL METHOD: ABNORMAL
EOSINOPHIL # BLD AUTO: 0.1 K/UL (ref 0–0.5)
EOSINOPHIL NFR BLD: 1.2 % (ref 0–8)
ERYTHROCYTE [DISTWIDTH] IN BLOOD BY AUTOMATED COUNT: 14.3 % (ref 11.5–14.5)
EST. GFR  (AFRICAN AMERICAN): >60 ML/MIN/1.73 M^2
EST. GFR  (NON AFRICAN AMERICAN): >60 ML/MIN/1.73 M^2
GLUCOSE SERPL-MCNC: 89 MG/DL (ref 70–110)
GLUCOSE UR QL STRIP: NEGATIVE
HCT VFR BLD AUTO: 31.2 % (ref 37–48.5)
HGB BLD-MCNC: 10 G/DL (ref 12–16)
HGB UR QL STRIP: NEGATIVE
HYALINE CASTS #/AREA URNS LPF: 25 /LPF
IMM GRANULOCYTES # BLD AUTO: 0.12 K/UL (ref 0–0.04)
IMM GRANULOCYTES NFR BLD AUTO: 1.3 % (ref 0–0.5)
KETONES UR QL STRIP: NEGATIVE
LACTATE SERPL-SCNC: 0.7 MMOL/L (ref 0.5–1.9)
LACTATE SERPL-SCNC: 0.8 MMOL/L (ref 0.5–1.9)
LEUKOCYTE ESTERASE UR QL STRIP: NEGATIVE
LYMPHOCYTES # BLD AUTO: 1.8 K/UL (ref 1–4.8)
LYMPHOCYTES NFR BLD: 19.8 % (ref 18–48)
MAGNESIUM SERPL-MCNC: 1.8 MG/DL (ref 1.6–2.6)
MCH RBC QN AUTO: 26.9 PG (ref 27–31)
MCHC RBC AUTO-ENTMCNC: 32.1 G/DL (ref 32–36)
MCV RBC AUTO: 84 FL (ref 82–98)
MICROSCOPIC COMMENT: ABNORMAL
MONOCYTES # BLD AUTO: 1.1 K/UL (ref 0.3–1)
MONOCYTES NFR BLD: 12 % (ref 4–15)
NEUTROPHILS # BLD AUTO: 5.9 K/UL (ref 1.8–7.7)
NEUTROPHILS NFR BLD: 65.3 % (ref 38–73)
NITRITE UR QL STRIP: NEGATIVE
NRBC BLD-RTO: 0 /100 WBC
PH UR STRIP: 6 [PH] (ref 5–8)
PLATELET # BLD AUTO: 237 K/UL (ref 150–450)
PMV BLD AUTO: 9.9 FL (ref 9.2–12.9)
POTASSIUM SERPL-SCNC: 3.6 MMOL/L (ref 3.5–5.1)
PROCALCITONIN SERPL IA-MCNC: 0.06 NG/ML (ref 0–0.5)
PROT SERPL-MCNC: 7.6 G/DL (ref 6–8.4)
PROT UR QL STRIP: ABNORMAL
RBC # BLD AUTO: 3.72 M/UL (ref 4–5.4)
RBC #/AREA URNS HPF: 5 /HPF (ref 0–4)
SAMPLE: NORMAL
SARS-COV-2 RDRP RESP QL NAA+PROBE: NEGATIVE
SODIUM SERPL-SCNC: 139 MMOL/L (ref 136–145)
SP GR UR STRIP: >1.03 (ref 1–1.03)
SQUAMOUS #/AREA URNS HPF: 9 /HPF
TROPONIN I SERPL DL<=0.01 NG/ML-MCNC: <0.03 NG/ML
URN SPEC COLLECT METH UR: ABNORMAL
UROBILINOGEN UR STRIP-ACNC: NEGATIVE EU/DL
WBC # BLD AUTO: 9.09 K/UL (ref 3.9–12.7)
WBC #/AREA URNS HPF: 2 /HPF (ref 0–5)

## 2022-03-12 PROCEDURE — 85025 COMPLETE CBC W/AUTO DIFF WBC: CPT | Performed by: EMERGENCY MEDICINE

## 2022-03-12 PROCEDURE — 81001 URINALYSIS AUTO W/SCOPE: CPT | Performed by: EMERGENCY MEDICINE

## 2022-03-12 PROCEDURE — 63600175 PHARM REV CODE 636 W HCPCS: Performed by: NURSE PRACTITIONER

## 2022-03-12 PROCEDURE — 93010 EKG 12-LEAD: ICD-10-PCS | Mod: ,,, | Performed by: INTERNAL MEDICINE

## 2022-03-12 PROCEDURE — 96361 HYDRATE IV INFUSION ADD-ON: CPT

## 2022-03-12 PROCEDURE — 83735 ASSAY OF MAGNESIUM: CPT | Performed by: EMERGENCY MEDICINE

## 2022-03-12 PROCEDURE — U0002 COVID-19 LAB TEST NON-CDC: HCPCS | Performed by: EMERGENCY MEDICINE

## 2022-03-12 PROCEDURE — G0378 HOSPITAL OBSERVATION PER HR: HCPCS

## 2022-03-12 PROCEDURE — 25500020 PHARM REV CODE 255: Performed by: EMERGENCY MEDICINE

## 2022-03-12 PROCEDURE — 93005 ELECTROCARDIOGRAM TRACING: CPT | Performed by: INTERNAL MEDICINE

## 2022-03-12 PROCEDURE — 80053 COMPREHEN METABOLIC PANEL: CPT | Performed by: EMERGENCY MEDICINE

## 2022-03-12 PROCEDURE — 63600175 PHARM REV CODE 636 W HCPCS: Performed by: EMERGENCY MEDICINE

## 2022-03-12 PROCEDURE — 96365 THER/PROPH/DIAG IV INF INIT: CPT

## 2022-03-12 PROCEDURE — 25000003 PHARM REV CODE 250: Performed by: NURSE PRACTITIONER

## 2022-03-12 PROCEDURE — 99285 EMERGENCY DEPT VISIT HI MDM: CPT | Mod: 25

## 2022-03-12 PROCEDURE — 82550 ASSAY OF CK (CPK): CPT | Performed by: EMERGENCY MEDICINE

## 2022-03-12 PROCEDURE — 83605 ASSAY OF LACTIC ACID: CPT | Performed by: EMERGENCY MEDICINE

## 2022-03-12 PROCEDURE — 96367 TX/PROPH/DG ADDL SEQ IV INF: CPT

## 2022-03-12 PROCEDURE — 96375 TX/PRO/DX INJ NEW DRUG ADDON: CPT

## 2022-03-12 PROCEDURE — 93010 ELECTROCARDIOGRAM REPORT: CPT | Mod: ,,, | Performed by: INTERNAL MEDICINE

## 2022-03-12 PROCEDURE — 96372 THER/PROPH/DIAG INJ SC/IM: CPT

## 2022-03-12 PROCEDURE — 84484 ASSAY OF TROPONIN QUANT: CPT | Performed by: EMERGENCY MEDICINE

## 2022-03-12 PROCEDURE — 84145 PROCALCITONIN (PCT): CPT | Performed by: EMERGENCY MEDICINE

## 2022-03-12 PROCEDURE — 86140 C-REACTIVE PROTEIN: CPT | Performed by: EMERGENCY MEDICINE

## 2022-03-12 PROCEDURE — 25000003 PHARM REV CODE 250: Performed by: EMERGENCY MEDICINE

## 2022-03-12 PROCEDURE — 87040 BLOOD CULTURE FOR BACTERIA: CPT | Mod: 59 | Performed by: EMERGENCY MEDICINE

## 2022-03-12 RX ORDER — ACETAMINOPHEN 325 MG/1
650 TABLET ORAL EVERY 8 HOURS PRN
Status: DISCONTINUED | OUTPATIENT
Start: 2022-03-12 | End: 2022-03-17 | Stop reason: HOSPADM

## 2022-03-12 RX ORDER — SODIUM,POTASSIUM PHOSPHATES 280-250MG
2 POWDER IN PACKET (EA) ORAL
Status: DISCONTINUED | OUTPATIENT
Start: 2022-03-12 | End: 2022-03-17 | Stop reason: HOSPADM

## 2022-03-12 RX ORDER — HYDROCODONE BITARTRATE AND ACETAMINOPHEN 5; 325 MG/1; MG/1
1 TABLET ORAL EVERY 6 HOURS PRN
Status: DISCONTINUED | OUTPATIENT
Start: 2022-03-12 | End: 2022-03-15

## 2022-03-12 RX ORDER — ACETAMINOPHEN 325 MG/1
650 TABLET ORAL EVERY 4 HOURS PRN
Status: DISCONTINUED | OUTPATIENT
Start: 2022-03-12 | End: 2022-03-17 | Stop reason: HOSPADM

## 2022-03-12 RX ORDER — LANOLIN ALCOHOL/MO/W.PET/CERES
800 CREAM (GRAM) TOPICAL
Status: DISCONTINUED | OUTPATIENT
Start: 2022-03-12 | End: 2022-03-17 | Stop reason: HOSPADM

## 2022-03-12 RX ORDER — VANCOMYCIN HCL IN 5 % DEXTROSE 1G/250ML
1000 PLASTIC BAG, INJECTION (ML) INTRAVENOUS ONCE
Status: COMPLETED | OUTPATIENT
Start: 2022-03-12 | End: 2022-03-12

## 2022-03-12 RX ORDER — TRAZODONE HYDROCHLORIDE 50 MG/1
100 TABLET ORAL NIGHTLY
Status: DISCONTINUED | OUTPATIENT
Start: 2022-03-12 | End: 2022-03-17 | Stop reason: HOSPADM

## 2022-03-12 RX ORDER — POTASSIUM CHLORIDE 750 MG/1
20 TABLET, EXTENDED RELEASE ORAL DAILY
Status: ON HOLD | COMMUNITY
Start: 2021-11-18 | End: 2022-03-16 | Stop reason: HOSPADM

## 2022-03-12 RX ORDER — NALOXONE HCL 0.4 MG/ML
0.02 VIAL (ML) INJECTION
Status: DISCONTINUED | OUTPATIENT
Start: 2022-03-12 | End: 2022-03-17 | Stop reason: HOSPADM

## 2022-03-12 RX ORDER — AMITRIPTYLINE HYDROCHLORIDE 25 MG/1
25 TABLET, FILM COATED ORAL NIGHTLY
Status: DISCONTINUED | OUTPATIENT
Start: 2022-03-12 | End: 2022-03-17 | Stop reason: HOSPADM

## 2022-03-12 RX ORDER — TIZANIDINE 4 MG/1
4 TABLET ORAL NIGHTLY
Status: DISCONTINUED | OUTPATIENT
Start: 2022-03-12 | End: 2022-03-17 | Stop reason: HOSPADM

## 2022-03-12 RX ORDER — ENOXAPARIN SODIUM 100 MG/ML
40 INJECTION SUBCUTANEOUS EVERY 12 HOURS
Status: DISCONTINUED | OUTPATIENT
Start: 2022-03-12 | End: 2022-03-17 | Stop reason: HOSPADM

## 2022-03-12 RX ORDER — TALC
6 POWDER (GRAM) TOPICAL NIGHTLY PRN
Status: DISCONTINUED | OUTPATIENT
Start: 2022-03-12 | End: 2022-03-17 | Stop reason: HOSPADM

## 2022-03-12 RX ORDER — FLUTICASONE PROPIONATE 50 MCG
1 SPRAY, SUSPENSION (ML) NASAL DAILY
Status: DISCONTINUED | OUTPATIENT
Start: 2022-03-13 | End: 2022-03-17 | Stop reason: HOSPADM

## 2022-03-12 RX ORDER — SODIUM CHLORIDE 0.9 % (FLUSH) 0.9 %
10 SYRINGE (ML) INJECTION
Status: DISCONTINUED | OUTPATIENT
Start: 2022-03-12 | End: 2022-03-17 | Stop reason: HOSPADM

## 2022-03-12 RX ORDER — PROCHLORPERAZINE EDISYLATE 5 MG/ML
5 INJECTION INTRAMUSCULAR; INTRAVENOUS EVERY 6 HOURS PRN
Status: DISCONTINUED | OUTPATIENT
Start: 2022-03-12 | End: 2022-03-17 | Stop reason: HOSPADM

## 2022-03-12 RX ORDER — MORPHINE SULFATE 4 MG/ML
8 INJECTION, SOLUTION INTRAMUSCULAR; INTRAVENOUS ONCE
Status: COMPLETED | OUTPATIENT
Start: 2022-03-12 | End: 2022-03-12

## 2022-03-12 RX ORDER — ONDANSETRON 2 MG/ML
4 INJECTION INTRAMUSCULAR; INTRAVENOUS
Status: COMPLETED | OUTPATIENT
Start: 2022-03-12 | End: 2022-03-12

## 2022-03-12 RX ORDER — ONDANSETRON 4 MG/1
8 TABLET, ORALLY DISINTEGRATING ORAL EVERY 8 HOURS PRN
Status: DISCONTINUED | OUTPATIENT
Start: 2022-03-12 | End: 2022-03-17 | Stop reason: HOSPADM

## 2022-03-12 RX ADMIN — PIPERACILLIN AND TAZOBACTAM 4.5 G: 4; .5 INJECTION, POWDER, LYOPHILIZED, FOR SOLUTION INTRAVENOUS; PARENTERAL at 08:03

## 2022-03-12 RX ADMIN — TIZANIDINE 4 MG: 4 TABLET ORAL at 10:03

## 2022-03-12 RX ADMIN — SODIUM CHLORIDE, SODIUM LACTATE, POTASSIUM CHLORIDE, AND CALCIUM CHLORIDE 1572 ML: .6; .31; .03; .02 INJECTION, SOLUTION INTRAVENOUS at 06:03

## 2022-03-12 RX ADMIN — VANCOMYCIN HYDROCHLORIDE 1000 MG: 1 INJECTION, POWDER, LYOPHILIZED, FOR SOLUTION INTRAVENOUS at 08:03

## 2022-03-12 RX ADMIN — ONDANSETRON 4 MG: 2 INJECTION INTRAMUSCULAR; INTRAVENOUS at 07:03

## 2022-03-12 RX ADMIN — MORPHINE SULFATE 8 MG: 4 INJECTION, SOLUTION INTRAMUSCULAR; INTRAVENOUS at 07:03

## 2022-03-12 RX ADMIN — AMITRIPTYLINE HYDROCHLORIDE 25 MG: 25 TABLET, FILM COATED ORAL at 10:03

## 2022-03-12 RX ADMIN — ENOXAPARIN SODIUM 40 MG: 40 INJECTION SUBCUTANEOUS at 10:03

## 2022-03-12 RX ADMIN — IOHEXOL 100 ML: 350 INJECTION, SOLUTION INTRAVENOUS at 07:03

## 2022-03-12 RX ADMIN — TRAZODONE HYDROCHLORIDE 100 MG: 50 TABLET ORAL at 10:03

## 2022-03-12 NOTE — ED PROVIDER NOTES
Encounter Date: 3/12/2022       History     Chief Complaint   Patient presents with    Leg Swelling     R lower leg with redness and swelling     This is a 41-year-old female who presents emergency department with right lower leg pain redness and swelling.  She says that she was seen here 2 days ago and given an antibiotic, doxycycline, and has been taking it as directed.  Says that she woke up this morning in the pain swelling and redness was much worse.  She denies any trauma or injury to her leg.  She says she has had infections on his leg in the past but this is the worst 1 that she has had.  She is not a diabetic.  She denies having any fever chills at home.  She does not think that she has had a DVT in this leg in the past.        Review of patient's allergies indicates:  No Known Allergies  Past Medical History:   Diagnosis Date    Anxiety     Depression     Migraine headache     Morbid obesity     Renal disorder     kidney stone     Past Surgical History:   Procedure Laterality Date    CHOLECYSTECTOMY      FRACTURE SURGERY  1984    right lower leg reconstruction surgery s/p trauma injury    TUBAL LIGATION       Family History   Problem Relation Age of Onset    Heart disease Mother      Social History     Tobacco Use    Smoking status: Current Every Day Smoker     Packs/day: 1.00     Types: Cigarettes    Smokeless tobacco: Never Used   Substance Use Topics    Alcohol use: No    Drug use: Never     Review of Systems   Constitutional: Negative for chills and fever.   HENT: Negative for sore throat.    Respiratory: Negative for shortness of breath.    Cardiovascular: Negative for chest pain.   Gastrointestinal: Negative for nausea and vomiting.   Genitourinary: Negative for dysuria.   Musculoskeletal: Positive for myalgias. Negative for back pain.   Skin: Positive for rash.   Neurological: Negative for weakness.   Hematological: Does not bruise/bleed easily.   All other systems reviewed and are  negative.      Physical Exam     Initial Vitals   BP Pulse Resp Temp SpO2   03/12/22 1711 03/12/22 1711 03/12/22 1710 03/12/22 1711 03/12/22 1713   139/89 80 17 98.4 °F (36.9 °C) 98 %      MAP       --                Physical Exam    Nursing note and vitals reviewed.  Constitutional: She appears well-developed and well-nourished. She is Obese . No distress.   HENT:   Head: Normocephalic and atraumatic.   Mouth/Throat: No oropharyngeal exudate.   Eyes: Conjunctivae and EOM are normal. Pupils are equal, round, and reactive to light.   Neck: Neck supple. No tracheal deviation present.   Normal range of motion.  Cardiovascular: Normal rate, regular rhythm, normal heart sounds and intact distal pulses.   No murmur heard.  Pulmonary/Chest: Breath sounds normal. No stridor. No respiratory distress. She has no wheezes. She has no rhonchi. She has no rales.   Abdominal: Abdomen is soft. She exhibits no distension. There is no abdominal tenderness. There is no rebound and no guarding.   Musculoskeletal:         General: Edema (Diffusely through the lower legs bilaterally.) present. No tenderness. Normal range of motion.      Cervical back: Normal range of motion and neck supple.      Comments: Please see the below picture.  Right posterior calf region there is a well-circumscribed area well delineated of erythema warmth tenderness and the superior lateral aspect there is some  ecchymotic type appearing lesion in the proximal tibial region posterior.  No crepitance to palpation.  There is severe tenderness to palpation.  Patient is able to walk.  Neurovascular intact distally with a pulse in the posterior tibial artery.     Neurological: She is alert and oriented to person, place, and time. She has normal strength. No cranial nerve deficit or sensory deficit. GCS score is 15. GCS eye subscore is 4. GCS verbal subscore is 5. GCS motor subscore is 6.   Skin: Skin is warm and dry. Capillary refill takes less than 2 seconds. No  rash noted. No erythema. No pallor.   Psychiatric: She has a normal mood and affect. Thought content normal.                         ED Course   Procedures  Labs Reviewed   CBC W/ AUTO DIFFERENTIAL - Abnormal; Notable for the following components:       Result Value    RBC 3.72 (*)     Hemoglobin 10.0 (*)     Hematocrit 31.2 (*)     MCH 26.9 (*)     Immature Granulocytes 1.3 (*)     Immature Grans (Abs) 0.12 (*)     Mono # 1.1 (*)     All other components within normal limits   COMPREHENSIVE METABOLIC PANEL - Abnormal; Notable for the following components:    Calcium 8.6 (*)     Albumin 3.2 (*)     All other components within normal limits   C-REACTIVE PROTEIN - Abnormal; Notable for the following components:    CRP 16.62 (*)     All other components within normal limits   CULTURE, BLOOD   CULTURE, BLOOD   LACTIC ACID, PLASMA   MAGNESIUM   TROPONIN I   PROCALCITONIN   SARS-COV-2 RNA AMPLIFICATION, QUAL   CK   ISTAT CREATININE   POCT CREATININE          Imaging Results          CT Leg (Tibia-Fibula) Wtih Contrast Right (Final result)  Result time 03/12/22 19:37:56    Final result by Bryan Galvez MD (03/12/22 19:37:56)                 Narrative:    CT right lower extremity with contrast    CLINICAL DATA: Right leg swelling, soft tissue infection suspected.    CMS MANDATED QUALITY DATA - CT RADIATION  436    All CT scans at this facility utilize dose modulation, iterative reconstruction, and/or weight based dosing when appropriate to reduce radiation dose to as low as reasonably achievable.    Findings: Thin section axial post infusion images were obtained from a level just above the knee to just below the ankle. Reformatted images in the sagittal and coronal plane are also reviewed.    There is no CT evidence of fracture. Alignment at the knee and ankle is normal. No osseous destructive lesion or periosteal reaction is identified.    There is mild to moderate edema in the subcutaneous fat of the lower leg, most  pronounced posteriorly. Associated with the subcutaneous edema posteriorly, there is moderate skin thickening. While nonspecific, these findings suggest cellulitis. There is no CT evidence of focal drainable fluid collection or soft tissue gas. No abnormal intramuscular compartment fluid collections are identified.    IMPRESSION:  1. Mild-to-moderate subcutaneous edema in the right lower leg, most pronounced in the calf region, with associated skin thickening. While nonspecific, the appearance suggests cellulitis. There is no evidence of soft tissue abscess, soft tissue gas, or intramuscular compartment fluid collection. No acute osseous abnormalities are demonstrated.    Electronically signed by:  Bryan Galvez MD  3/12/2022 7:37 PM CST Workstation: [id number]                             X-Ray Chest AP Portable (Final result)  Result time 03/12/22 18:36:31    Final result by Bryan Galvez MD (03/12/22 18:36:31)                 Narrative:    Chest single view    Clinical data:Sepsis, leg swelling    FINDINGS: AP view of the chest demonstrates no cardiac, pulmonary, or osseous abnormalities.    IMPRESSION:  1. Normal chest single view.    Electronically signed by:  Bryan Galvez MD  3/12/2022 6:36 PM CST Workstation: [id number]                               Medications   vancomycin - pharmacy to dose (has no administration in time range)   piperacillin-tazobactam 4.5 g in dextrose 5 % 100 mL IVPB (ready to mix system) (has no administration in time range)   acetaminophen tablet 650 mg (has no administration in time range)   naloxone 0.4 mg/mL injection 0.02 mg (has no administration in time range)   magnesium oxide tablet 800 mg (has no administration in time range)   magnesium oxide tablet 800 mg (has no administration in time range)   potassium, sodium phosphates 280-160-250 mg packet 2 packet (has no administration in time range)   potassium, sodium phosphates 280-160-250 mg packet 2 packet (has no  administration in time range)   potassium, sodium phosphates 280-160-250 mg packet 2 packet (has no administration in time range)   sodium chloride 0.9% flush 10 mL (has no administration in time range)   enoxaparin injection 40 mg (40 mg Subcutaneous Given 3/12/22 2212)   potassium bicarbonate disintegrating tablet 50 mEq (has no administration in time range)   potassium bicarbonate disintegrating tablet 35 mEq (has no administration in time range)   potassium bicarbonate disintegrating tablet 60 mEq (has no administration in time range)   acetaminophen tablet 650 mg (has no administration in time range)   HYDROcodone-acetaminophen 5-325 mg per tablet 1 tablet (has no administration in time range)   ondansetron disintegrating tablet 8 mg (has no administration in time range)   prochlorperazine injection Soln 5 mg (has no administration in time range)   melatonin tablet 6 mg (6 mg Oral Given 3/13/22 0008)   amitriptyline tablet 25 mg (25 mg Oral Given 3/12/22 2211)   fluticasone propionate 50 mcg/actuation nasal spray 50 mcg (has no administration in time range)   tiZANidine tablet 4 mg (4 mg Oral Given 3/12/22 2211)   traZODone tablet 100 mg (100 mg Oral Given 3/12/22 2211)   vancomycin (VANCOCIN) 2,000 mg in dextrose 5 % 500 mL IVPB (2,000 mg Intravenous Trough Due As Scheduled Before Dose 3/14/22 0800)   lactated ringers bolus 1,572 mL (0 mL/kg × 52.4 kg (Ideal) Intravenous Stopped 3/12/22 2000)   piperacillin-tazobactam 4.5 g in dextrose 5 % 100 mL IVPB (ready to mix system) (0 g Intravenous Stopped 3/12/22 2030)   vancomycin in dextrose 5 % 1 gram/250 mL IVPB 1,000 mg (0 mg Intravenous Stopped 3/12/22 2226)     Followed by   vancomycin in dextrose 5 % 1 gram/250 mL IVPB 1,000 mg (0 mg Intravenous Stopped 3/12/22 2226)   morphine injection 8 mg (8 mg Intravenous Given 3/12/22 1901)   ondansetron injection 4 mg (4 mg Intravenous Given 3/12/22 1901)   iohexoL (OMNIPAQUE 350) injection 100 mL (100 mLs Intravenous  Given 3/12/22 1921)     Medical Decision Making:   ED Management:  This is a 41-year-old female who presents emergency department with right leg swelling redness and cellulitis.  Given the extent of the cellulitis, in the bluish discoloration of the lateral upper posterior calf I obtained CT scan to rule out any soft tissue gas or any obvious necrotizing infection.  No evidence of this was found.  Patient's labs relatively reassuring.  Her pain is improved after 1 dose of morphine.  I have a low suspicion for any necrotizing fasciitis.  This is likely is significant cellulitis.  White count is normal lactic acid is negative.  Patient has been given Zosyn vancomycin the emergency department.  Plan is to admit her to the hospital for IV antibiotics as she has failed outpatient therapy with p.o. antibiotics.  She is in agreement.             ED Course as of 03/13/22 0031   Sat Mar 12, 2022   1822 EKG 6:06 p.m. normal sinus rhythm rate of 71. No ST elevation or depression.  No STEMI.  EKG interpreted independently by me. [JR]   2036 I discussed the case with Dorian Harrell who will admit the patient to the hospital [JR]      ED Course User Index  [JR] Bob Cee DO             Clinical Impression:   Final diagnoses:  [L03.115] Cellulitis of right leg (Primary)          ED Disposition Condition    Observation               Bob Cee DO  03/13/22 0031

## 2022-03-12 NOTE — ED NOTES
41 YOF c/o right lower leg swelling and pain that began on Thursday with no improvement. pt stated was placed on antibiotics on Thursday for same compliant. Swelling, redness and warmth noted to right lower extremity. Denies any other complaints.     Adult Physical Assessment  LOC: Patient is awake, alert, oriented and speaking appropriately at this time.  APPEARANCE: Patient resting comfortably and appears to be in no acute distress at this time. Patient is clean and well groomed, patient's clothing is properly fastened.  SKIN:The skin is warm and dry, color consistent with ethnicity, patient has normal skin turgor and moist mucus membranes, skin intact, no breakdown or brusing noted.  MUSCULOSKELETAL: Patient moving all extremities well, no deformities noted.  RESPIRATORY: Airway is open and patent, respirations are spontaneous, patient has a normal effort and rate, no accessory muscle use noted.  CARDIAC: Patient has a normal rate and rhythm, no periphreal edema noted in any extremity, capillary refill < 3 seconds in all extremities.  ABDOMEN: Soft and non tender to palpation, no abdominal distention noted.  NEUROLOGIC: Eyes open spontaneously, behavior appropriate to situation, follows commands, facial expression symmetrical, bilateral hand grasp equal and even, purposeful motor response noted, normal sensation in all extremities when touched with a finger.      VSS. ED workup in progress. Call light within pt reach. Safety measures in place: side rails up X2. Will continue to monitor.

## 2022-03-13 PROBLEM — E66.01 MORBID OBESITY: Status: ACTIVE | Noted: 2022-03-13

## 2022-03-13 LAB
ALBUMIN SERPL BCP-MCNC: 2.9 G/DL (ref 3.5–5.2)
ALP SERPL-CCNC: 87 U/L (ref 55–135)
ALT SERPL W/O P-5'-P-CCNC: 30 U/L (ref 10–44)
ANION GAP SERPL CALC-SCNC: 10 MMOL/L (ref 8–16)
AST SERPL-CCNC: 23 U/L (ref 10–40)
BASOPHILS # BLD AUTO: 0.04 K/UL (ref 0–0.2)
BASOPHILS NFR BLD: 0.7 % (ref 0–1.9)
BILIRUB SERPL-MCNC: 0.7 MG/DL (ref 0.1–1)
BUN SERPL-MCNC: 13 MG/DL (ref 6–20)
CALCIUM SERPL-MCNC: 8.6 MG/DL (ref 8.7–10.5)
CHLORIDE SERPL-SCNC: 104 MMOL/L (ref 95–110)
CO2 SERPL-SCNC: 24 MMOL/L (ref 23–29)
CREAT SERPL-MCNC: 1.1 MG/DL (ref 0.5–1.4)
DIFFERENTIAL METHOD: ABNORMAL
EOSINOPHIL # BLD AUTO: 0.2 K/UL (ref 0–0.5)
EOSINOPHIL NFR BLD: 2.9 % (ref 0–8)
ERYTHROCYTE [DISTWIDTH] IN BLOOD BY AUTOMATED COUNT: 14.4 % (ref 11.5–14.5)
EST. GFR  (AFRICAN AMERICAN): >60 ML/MIN/1.73 M^2
EST. GFR  (NON AFRICAN AMERICAN): >60 ML/MIN/1.73 M^2
GLUCOSE SERPL-MCNC: 93 MG/DL (ref 70–110)
HCT VFR BLD AUTO: 31.4 % (ref 37–48.5)
HGB BLD-MCNC: 9.8 G/DL (ref 12–16)
IMM GRANULOCYTES # BLD AUTO: 0.15 K/UL (ref 0–0.04)
IMM GRANULOCYTES NFR BLD AUTO: 2.4 % (ref 0–0.5)
LYMPHOCYTES # BLD AUTO: 1.5 K/UL (ref 1–4.8)
LYMPHOCYTES NFR BLD: 24.1 % (ref 18–48)
MAGNESIUM SERPL-MCNC: 1.6 MG/DL (ref 1.6–2.6)
MCH RBC QN AUTO: 26.8 PG (ref 27–31)
MCHC RBC AUTO-ENTMCNC: 31.2 G/DL (ref 32–36)
MCV RBC AUTO: 86 FL (ref 82–98)
MONOCYTES # BLD AUTO: 0.8 K/UL (ref 0.3–1)
MONOCYTES NFR BLD: 12.7 % (ref 4–15)
NEUTROPHILS # BLD AUTO: 3.5 K/UL (ref 1.8–7.7)
NEUTROPHILS NFR BLD: 57.2 % (ref 38–73)
NRBC BLD-RTO: 0 /100 WBC
PHOSPHATE SERPL-MCNC: 5.3 MG/DL (ref 2.7–4.5)
PLATELET # BLD AUTO: 196 K/UL (ref 150–450)
PMV BLD AUTO: 10.3 FL (ref 9.2–12.9)
POTASSIUM SERPL-SCNC: 3.8 MMOL/L (ref 3.5–5.1)
PROT SERPL-MCNC: 7.2 G/DL (ref 6–8.4)
RBC # BLD AUTO: 3.66 M/UL (ref 4–5.4)
SODIUM SERPL-SCNC: 138 MMOL/L (ref 136–145)
WBC # BLD AUTO: 6.15 K/UL (ref 3.9–12.7)

## 2022-03-13 PROCEDURE — 96366 THER/PROPH/DIAG IV INF ADDON: CPT

## 2022-03-13 PROCEDURE — 85025 COMPLETE CBC W/AUTO DIFF WBC: CPT | Performed by: NURSE PRACTITIONER

## 2022-03-13 PROCEDURE — G0378 HOSPITAL OBSERVATION PER HR: HCPCS

## 2022-03-13 PROCEDURE — 36415 COLL VENOUS BLD VENIPUNCTURE: CPT | Performed by: NURSE PRACTITIONER

## 2022-03-13 PROCEDURE — 84100 ASSAY OF PHOSPHORUS: CPT | Performed by: NURSE PRACTITIONER

## 2022-03-13 PROCEDURE — 96372 THER/PROPH/DIAG INJ SC/IM: CPT | Mod: 59

## 2022-03-13 PROCEDURE — 83735 ASSAY OF MAGNESIUM: CPT | Performed by: NURSE PRACTITIONER

## 2022-03-13 PROCEDURE — 25000003 PHARM REV CODE 250: Performed by: NURSE PRACTITIONER

## 2022-03-13 PROCEDURE — 63600175 PHARM REV CODE 636 W HCPCS: Performed by: NURSE PRACTITIONER

## 2022-03-13 PROCEDURE — 63600175 PHARM REV CODE 636 W HCPCS: Performed by: HOSPITALIST

## 2022-03-13 PROCEDURE — 25000003 PHARM REV CODE 250: Performed by: HOSPITALIST

## 2022-03-13 PROCEDURE — 80053 COMPREHEN METABOLIC PANEL: CPT | Performed by: NURSE PRACTITIONER

## 2022-03-13 RX ADMIN — PIPERACILLIN SODIUM AND TAZOBACTAM SODIUM 4.5 G: 4; .5 INJECTION, POWDER, LYOPHILIZED, FOR SOLUTION INTRAVENOUS at 01:03

## 2022-03-13 RX ADMIN — ENOXAPARIN SODIUM 40 MG: 40 INJECTION SUBCUTANEOUS at 08:03

## 2022-03-13 RX ADMIN — VANCOMYCIN HYDROCHLORIDE 2000 MG: 1 INJECTION, POWDER, LYOPHILIZED, FOR SOLUTION INTRAVENOUS at 08:03

## 2022-03-13 RX ADMIN — Medication 6 MG: at 12:03

## 2022-03-13 RX ADMIN — AMITRIPTYLINE HYDROCHLORIDE 25 MG: 25 TABLET, FILM COATED ORAL at 08:03

## 2022-03-13 RX ADMIN — PIPERACILLIN SODIUM AND TAZOBACTAM SODIUM 4.5 G: 4; .5 INJECTION, POWDER, LYOPHILIZED, FOR SOLUTION INTRAVENOUS at 04:03

## 2022-03-13 RX ADMIN — VANCOMYCIN HYDROCHLORIDE 2000 MG: 1 INJECTION, POWDER, LYOPHILIZED, FOR SOLUTION INTRAVENOUS at 11:03

## 2022-03-13 RX ADMIN — PIPERACILLIN SODIUM AND TAZOBACTAM SODIUM 4.5 G: 4; .5 INJECTION, POWDER, LYOPHILIZED, FOR SOLUTION INTRAVENOUS at 07:03

## 2022-03-13 RX ADMIN — TRAZODONE HYDROCHLORIDE 100 MG: 50 TABLET ORAL at 08:03

## 2022-03-13 RX ADMIN — TIZANIDINE 4 MG: 4 TABLET ORAL at 08:03

## 2022-03-13 RX ADMIN — Medication 6 MG: at 08:03

## 2022-03-13 RX ADMIN — HYDROCODONE BITARTRATE AND ACETAMINOPHEN 1 TABLET: 5; 325 TABLET ORAL at 03:03

## 2022-03-13 RX ADMIN — HYDROCODONE BITARTRATE AND ACETAMINOPHEN 1 TABLET: 5; 325 TABLET ORAL at 08:03

## 2022-03-13 RX ADMIN — HYDROCODONE BITARTRATE AND ACETAMINOPHEN 1 TABLET: 5; 325 TABLET ORAL at 09:03

## 2022-03-13 NOTE — ASSESSMENT & PLAN NOTE
Continue iv zosyn/iv vancomycin  Failed OP Doxycycline Rx  CT showed Mild-to-moderate subcutaneous edema in the right lower leg, most pronounced in the calf region, with associated skin thickening,suggests cellulitis.   There is no evidence of soft tissue abscess, soft tissue gas, or intramuscular compartment fluid collection

## 2022-03-13 NOTE — PROGRESS NOTES
VANCOMYCIN PHARMACOKINETIC NOTE:  Vancomycin Day # 1    Objective/Assessment:    Diagnosis/Indication for Vancomycin:Cellulitis      41 y.o., female; Actual Body Weight = 127 kg (280 lb).    The patient has the following labs:  3/12/2022 Estimated Creatinine Clearance: 96.1 mL/min (based on SCr of 1 mg/dL). Lab Results   Component Value Date    BUN 14 03/12/2022     Lab Results   Component Value Date    WBC 9.09 03/12/2022          Plan:  Adjust vancomycin dose and/or frequency based on the patient's actual weight and renal function:  Initiate Vancomycin 2000 mg IV every 12 hours.  Orders have been entered into patient's chart.        Vancomycin trough level has been ordered for 3/14 @ 0800, prior to 4th dose.     Pharmacy will manage vancomycin therapy, monitor serum vancomycin levels, monitor renal function and adjust regimen as necessary.    Thank you for allowing us to participate in this patient's care.     Wliliam Serrano 3/12/2022   Department of Pharmacy  Ext [phone]

## 2022-03-13 NOTE — HOSPITAL COURSE
Patient found to have right lower extremity cellulitis due to combination of lymphedema and morbid obesity.  She was started on IV antibiotics. Patient's fever curve, erythema, and lower extremity tenderness improved. Repeat CT imaging of the right leg did not reveal abscess or phlegmon. Surgical I&D not indicated. Patient was able to ambulate independently prior to discharge. She will be discharged on PO clindamycin and PO keflex. She will make appointments to follow up with lymphedema clinic and wound care. She will need to establish with a primary care physician

## 2022-03-13 NOTE — H&P
[facility] Medicine History & Physical Examination   Patient Name: Roya Leone  MRN: [id number]  Patient Class: OP- Observation   Admission Date: 3/12/2022  5:14 PM  Length of Stay: 0  Attending Physician: Kelby Valle MD  Primary Care Provider: Primary Doctor No  Face-to-Face encounter date: 03/12/2022  Code Status: Full code    Chief Complaint: Leg Swelling (R lower leg with redness and swelling)        HISTORY OF PRESENT ILLNESS:   Roya Leone is a 41 y.o. White female with known history of anxiety, depression, migraine headache, morbid obesity, kidney stone who presented with a primary complaint of Leg Swelling (R lower leg with redness and swelling)  History was obtained from the patient and collateral obtained from the family present at the bedside and ER physician Sign-out.     Reports having intermittent fever for the past 5 days.  Notices right lower leg pain, redness, and swelling.  Denied trauma or injury.  Seen in the ED on 03/10/2022.  Ultrasound of right lower extremity showed no DVT.  She was diagnosed with cellulitis of right lower extremity and discharged home on doxycycline.  She reports increasing pain, redness, and swelling despite taking antibiotics as prescribed.  She subsequently returned to the ED.     She reports some sore throat.  Denies shortness of breath, chest pain, abdominal pain, nausea, vomiting, diarrhea. Had dysuria a few days ago but resolved.    Laboratory studies were unremarkable except elevated CRP.  Lactic acid was within normal limits.  Chest imaging was negative. CT leg showed mild-to-moderate subcutaneous edema in the right lower leg, most pronounced in the calf region, with associated skin thickening, suggests cellulitis. There was no evidence of soft tissue abscess, soft tissue gas, or intramuscular compartment fluid collection.     REVIEW OF SYSTEMS:   10 Point Review of System was performed and was found to be  negative except for that mentioned already in the HPI above.     PAST MEDICAL HISTORY:     Past Medical History:   Diagnosis Date    Anxiety     Depression     Migraine headache     Morbid obesity     Renal disorder     kidney stone       PAST SURGICAL HISTORY:     Past Surgical History:   Procedure Laterality Date    CHOLECYSTECTOMY      FRACTURE SURGERY  1984    right lower leg reconstruction surgery s/p trauma injury    TUBAL LIGATION         ALLERGIES:   Patient has no known allergies.    FAMILY HISTORY:     Family History   Problem Relation Age of Onset    Heart disease Mother        SOCIAL HISTORY:     Social History     Tobacco Use    Smoking status: Current Every Day Smoker     Packs/day: 1.00     Types: Cigarettes    Smokeless tobacco: Never Used   Substance Use Topics    Alcohol use: No        Social History     Substance and Sexual Activity   Sexual Activity Yes    Birth control/protection: None        HOME MEDICATIONS:     Prior to Admission medications    Medication Sig Start Date End Date Taking? Authorizing Provider   amitriptyline (ELAVIL) 25 MG tablet Take 25 mg by mouth every evening. 11/18/21 11/18/22 Yes Historical Provider   dicyclomine (BENTYL) 20 mg tablet Take 1 tablet (20 mg total) by mouth 2 (two) times daily. for 10 days 3/10/22 3/20/22 Yes Jina Florian NP   doxycycline (VIBRAMYCIN) 100 MG Cap Take 1 capsule (100 mg total) by mouth 2 (two) times daily. for 10 days 3/10/22 3/20/22 Yes Jina Florian NP   fluticasone propionate (FLONASE) 50 mcg/actuation nasal spray 1 spray by Each Nostril route once daily.   Yes Historical Provider   LIDOcaine (LIDODERM) 5 % Place 1 patch onto the skin every 12 (twelve) hours. 2/25/22  Yes Historical Provider   naproxen (NAPROSYN) 500 MG tablet Take 500 mg by mouth 2 (two) times daily. 2/22/22  Yes Historical Provider   ondansetron (ZOFRAN-ODT) 4 MG TbDL Take 1 tablet (4 mg total) by mouth every 8 (eight) hours as needed (nausea). 3/10/22  3/15/22 Yes Jina Florian, TO   potassium chloride (KLOR-CON) 10 MEQ TbSR Take 20 mEq by mouth once daily. 11/18/21  Yes Historical Provider   tiZANidine (ZANAFLEX) 4 MG tablet Take 4 mg by mouth nightly. 2/25/22  Yes Historical Provider   traMADoL (ULTRAM) 50 mg tablet Take 50 mg by mouth 3 (three) times daily as needed. 2/25/22  Yes Historical Provider   cephALEXin (KEFLEX) 500 MG capsule cephalexin 500 mg capsule    Historical Provider   diclofenac (VOLTAREN) 25 MG TbEC Take 25 mg by mouth 2 (two) times a day. 11/18/21 11/18/22  Historical Provider   HYDROcodone-acetaminophen (NORCO) 5-325 mg per tablet Take 1 tablet by mouth every 4 (four) hours as needed. 10/25/21   Historical Provider   lorcaserin (BELVIQ) 10 mg Tab Belviq 10 mg tablet   Take 1 tablet twice a day by oral route as directed.    Historical Provider   sumatriptan (IMITREX) 25 MG Tab Take 25 mg by mouth.    Historical Provider   traZODone (DESYREL) 100 MG tablet Take 100 mg by mouth every evening.    Historical Provider   furosemide (LASIX) 40 MG tablet Take 40 mg by mouth 2 (two) times daily.  3/10/22  Historical Provider   gabapentin (NEURONTIN) 300 MG capsule Take 300 mg by mouth 3 (three) times daily.  3/10/22  Historical Provider   omeprazole (PRILOSEC) 40 MG capsule Take 40 mg by mouth once daily.  3/10/22  Historical Provider   propranolol (INDERAL) 20 MG tablet Take 20 mg by mouth 2 (two) times daily.   3/10/22  Historical Provider         PHYSICAL EXAM:   /64   Pulse 80   Temp 98.4 °F (36.9 °C) (Oral)   Resp (!) 32   Wt 127 kg (280 lb)   SpO2 98%   BMI 49.60 kg/m²   Vitals Reviewed  General appearance:  Obese, non toxic appearing White female  Skin: No Rash. Redness and damp under abdominal fold. Redness, tenderness, swelling right calf/right leg, see images below.  Neuro: Motor and sensory exams grossly intact. Good tone. Power in all 4 extremities 5/5.   HENT: Atraumatic head. Moist mucous membranes of oral cavity.  Eyes:  Normal extraocular movements.   Neck: Supple. No evidence of lymphadenopathy. No thyroidomegaly.  Lungs: Clear to auscultation bilaterally. No wheezing present.   Heart: Regular rate and rhythm. S1 and S2 present with no murmurs/gallop/rub. No pedal edema. No JVD present.   Abdomen: Large, soft, non-distended, non-tender. Bowel sounds are normal.  Extremities: No cyanosis, clubbing.  Psych/mental status: Alert and oriented. Cooperative. Responds appropriately to questions.                     EMERGENCY DEPARTMENT LABS AND IMAGING:     Labs Reviewed   CBC W/ AUTO DIFFERENTIAL - Abnormal; Notable for the following components:       Result Value    RBC 3.72 (*)     Hemoglobin 10.0 (*)     Hematocrit 31.2 (*)     MCH 26.9 (*)     Immature Granulocytes 1.3 (*)     Immature Grans (Abs) 0.12 (*)     Mono # 1.1 (*)     All other components within normal limits   COMPREHENSIVE METABOLIC PANEL - Abnormal; Notable for the following components:    Calcium 8.6 (*)     Albumin 3.2 (*)     All other components within normal limits   C-REACTIVE PROTEIN - Abnormal; Notable for the following components:    CRP 16.62 (*)     All other components within normal limits   CULTURE, BLOOD   CULTURE, BLOOD   LACTIC ACID, PLASMA   MAGNESIUM   TROPONIN I   PROCALCITONIN   SARS-COV-2 RNA AMPLIFICATION, QUAL   CK   LACTIC ACID, PLASMA   URINALYSIS, REFLEX TO URINE CULTURE   ISTAT CREATININE   POCT CREATININE       CT Leg (Tibia-Fibula) Wtih Contrast Right   Final Result      X-Ray Chest AP Portable   Final Result          ASSESSMENT & PLAN:   Roya Leone is a 41 y.o. female admitted for    RLE cellulitis, failed outpatient treatment  Obesity, BMI 49.6  Tobacco use  Anxiety/Depression    Plan  Admit, observation, medical-surgical unit  Does not meet criteria for sepsis, not hypotensive  Received IV bolus 1600 ml/hr in the ED  Empiric antibiotic with IV Zosyn and IV vancomycin started in the ED, continue  Follow-up blood  culture  Advised smoking cessation  Continue home medication  Daily CBC CMP, magnesium, phosphorus  Replete electrolytes per protocol    Diet: Cardiac    DVT Prophylaxis: Sub Q Lovenox for DVT prophylaxis. Encourage ambulation and OOB as tolerated.     Discharge Planning and Disposition:  Patient will be discharged in 1-2 days.  ________________________________________________________________________________    Face-to-Face encounter date: 03/12/2022  Encounter included review of the medical records, interviewing and examining the patient face-to-face, discussion with family and other health care providers including emergency medicine physician, admission orders, interpreting lab/test results and formulating a plan of care.   Medical Decision Making during this encounter was  [_] Low Complexity  [_] Moderate Complexity  [x] High Complexity  _________________________________________________________________________________    INPATIENT LIST OF MEDICATIONS     Current Facility-Administered Medications:     acetaminophen tablet 650 mg, 650 mg, Oral, Q8H PRN, [place] P. Wesly, APRN    acetaminophen tablet 650 mg, 650 mg, Oral, Q4H PRN, [place] P. Wesly, APRN    amitriptyline tablet 25 mg, 25 mg, Oral, QHS, [place] P. Wesly, APRN    enoxaparin injection 40 mg, 40 mg, Subcutaneous, Q12H, Rupali Jarrell, APRN    [START ON 3/13/2022] fluticasone propionate 50 mcg/actuation nasal spray 50 mcg, 1 spray, Each Nostril, Daily, [place] HUDSON. Wesly, APRN    HYDROcodone-acetaminophen 5-325 mg per tablet 1 tablet, 1 tablet, Oral, Q6H PRN, [place] HUDSON. Wesly, APRN    magnesium oxide tablet 800 mg, 800 mg, Oral, PRN, [place] P. Wesly, APRN    magnesium oxide tablet 800 mg, 800 mg, Oral, PRN, [place] PRima Jarrell, APRN    melatonin tablet 6 mg, 6 mg, Oral, Nightly PRN, Rupali Jarrell, APRN    naloxone 0.4 mg/mL injection 0.02 mg, 0.02 mg, Intravenous, PRN, Rupali Jarrell, APRN    ondansetron disintegrating tablet 8 mg,  8 mg, Oral, Q8H PRN, Rupali Jarrell, APRN    [START ON 3/13/2022] piperacillin-tazobactam 4.5 g in dextrose 5 % 100 mL IVPB (ready to mix system), 4.5 g, Intravenous, Q8H, [place] P. Wesly, APRN    potassium bicarbonate disintegrating tablet 35 mEq, 35 mEq, Oral, PRN, [place] P. Wesly, APRN    potassium bicarbonate disintegrating tablet 50 mEq, 50 mEq, Oral, PRN, [place] P. Wesly, APRN    potassium bicarbonate disintegrating tablet 60 mEq, 60 mEq, Oral, PRN, [place] P. Wesly, APRN    potassium, sodium phosphates 280-160-250 mg packet 2 packet, 2 packet, Oral, PRN, [place] P. Wesly, APRN    potassium, sodium phosphates 280-160-250 mg packet 2 packet, 2 packet, Oral, PRN, [place] P. Wesly, APRN    potassium, sodium phosphates 280-160-250 mg packet 2 packet, 2 packet, Oral, PRN, [place] P. Wesly, APRN    prochlorperazine injection Soln 5 mg, 5 mg, Intravenous, Q6H PRN, Rupali TREVIÑO. Wesly, APRN    sodium chloride 0.9% flush 10 mL, 10 mL, Intravenous, PRN, [place] P. Wesly, APRN    tiZANidine tablet 4 mg, 4 mg, Oral, Nightly, [place]ge P. Wesly, APRN    traZODone tablet 100 mg, 100 mg, Oral, QHS, [place] HUDSON. Wesly, APRN    Pharmacy to dose Vancomycin consult, , , Once **AND** vancomycin - pharmacy to dose, , Intravenous, pharmacy to manage frequency, Bob Cee DO    vancomycin in dextrose 5 % 1 gram/250 mL IVPB 1,000 mg, 1,000 mg, Intravenous, Once, Last Rate: 166.7 mL/hr at 03/12/22 2056, 1,000 mg at 03/12/22 2056 **FOLLOWED BY** vancomycin in dextrose 5 % 1 gram/250 mL IVPB 1,000 mg, 1,000 mg, Intravenous, Once, Bob Cee DO, Last Rate: 166.7 mL/hr at 03/12/22 2056, 1,000 mg at 03/12/22 2056    Current Outpatient Medications:     amitriptyline (ELAVIL) 25 MG tablet, Take 25 mg by mouth every evening., Disp: , Rfl:     dicyclomine (BENTYL) 20 mg tablet, Take 1 tablet (20 mg total) by mouth 2 (two) times daily. for 10 days, Disp: 20 tablet, Rfl: 0    doxycycline (VIBRAMYCIN) 100 MG  Cap, Take 1 capsule (100 mg total) by mouth 2 (two) times daily. for 10 days, Disp: 20 capsule, Rfl: 0    fluticasone propionate (FLONASE) 50 mcg/actuation nasal spray, 1 spray by Each Nostril route once daily., Disp: , Rfl:     LIDOcaine (LIDODERM) 5 %, Place 1 patch onto the skin every 12 (twelve) hours., Disp: , Rfl:     naproxen (NAPROSYN) 500 MG tablet, Take 500 mg by mouth 2 (two) times daily., Disp: , Rfl:     ondansetron (ZOFRAN-ODT) 4 MG TbDL, Take 1 tablet (4 mg total) by mouth every 8 (eight) hours as needed (nausea)., Disp: 15 tablet, Rfl: 0    potassium chloride (KLOR-CON) 10 MEQ TbSR, Take 20 mEq by mouth once daily., Disp: , Rfl:     tiZANidine (ZANAFLEX) 4 MG tablet, Take 4 mg by mouth nightly., Disp: , Rfl:     traMADoL (ULTRAM) 50 mg tablet, Take 50 mg by mouth 3 (three) times daily as needed., Disp: , Rfl:     traZODone (DESYREL) 100 MG tablet, Take 100 mg by mouth every evening., Disp: , Rfl:       Scheduled Meds:   amitriptyline  25 mg Oral QHS    enoxparin  40 mg Subcutaneous Q12H    [START ON 3/13/2022] fluticasone propionate  1 spray Each Nostril Daily    [START ON 3/13/2022] piperacillin-tazobactam (ZOSYN) IVPB  4.5 g Intravenous Q8H    tiZANidine  4 mg Oral Nightly    traZODone  100 mg Oral QHS    vancomycin (VANCOCIN) IVPB  1,000 mg Intravenous Once    Followed by    vancomycin (VANCOCIN) IVPB  1,000 mg Intravenous Once     Continuous Infusions:  PRN Meds:.acetaminophen, acetaminophen, HYDROcodone-acetaminophen, magnesium oxide, magnesium oxide, melatonin, naloxone, ondansetron, potassium bicarbonate, potassium bicarbonate, potassium bicarbonate, potassium, sodium phosphates, potassium, sodium phosphates, potassium, sodium phosphates, prochlorperazine, sodium chloride 0.9%, Pharmacy to dose Vancomycin consult **AND** vancomycin - pharmacy to dose      Rupali Jarrell  Acute Care Nurse Practitioner  Hospitalist Service  03/12/2022

## 2022-03-13 NOTE — SUBJECTIVE & OBJECTIVE
Interval History:     Review of Systems   Constitutional:  Negative for activity change and appetite change.   HENT:  Negative for congestion and dental problem.    Eyes:  Negative for discharge and itching.   Respiratory:  Negative for shortness of breath.    Cardiovascular:  Negative for chest pain.   Gastrointestinal:  Negative for abdominal distention and abdominal pain.   Endocrine: Negative for cold intolerance.   Genitourinary:  Negative for difficulty urinating and dysuria.   Musculoskeletal:  Negative for arthralgias and back pain.   Skin:  Positive for color change and rash.   Neurological:  Negative for dizziness and facial asymmetry.   Hematological:  Negative for adenopathy.   Psychiatric/Behavioral:  Negative for agitation and behavioral problems.    Objective:     Vital Signs (Most Recent):  Temp: 99.2 °F (37.3 °C) (03/13/22 1734)  Pulse: 82 (03/13/22 1734)  Resp: 18 (03/13/22 1734)  BP: 126/74 (03/13/22 1734)  SpO2: (!) 94 % (03/13/22 1734)   Vital Signs (24h Range):  Temp:  [98.5 °F (36.9 °C)-99.2 °F (37.3 °C)] 99.2 °F (37.3 °C)  Pulse:  [72-84] 82  Resp:  [16-32] 18  SpO2:  [94 %-99 %] 94 %  BP: ()/(53-81) 126/74     Weight: (!) 149 kg (328 lb 7.8 oz)  Body mass index is 54.73 kg/m².    Intake/Output Summary (Last 24 hours) at 3/13/2022 1844  Last data filed at 3/12/2022 2208  Gross per 24 hour   Intake 1672 ml   Output 300 ml   Net 1372 ml      Physical Exam  Vitals and nursing note reviewed.   Constitutional:       General: She is not in acute distress.  HENT:      Head: Atraumatic.      Right Ear: External ear normal.      Left Ear: External ear normal.      Nose: Nose normal.      Mouth/Throat:      Mouth: Mucous membranes are moist.   Eyes:      General: No scleral icterus.  Cardiovascular:      Rate and Rhythm: Normal rate.   Pulmonary:      Effort: Pulmonary effort is normal.   Abdominal:      General: There is no distension.   Musculoskeletal:         General: Normal range of motion.       Cervical back: Normal range of motion.   Skin:     General: Skin is warm.      Comments: RLE rash/cellulitis    Neurological:      Mental Status: She is alert and oriented to person, place, and time.       Significant Labs: All pertinent labs within the past 24 hours have been reviewed.  CBC:   Recent Labs   Lab 03/12/22  1814 03/13/22  1458   WBC 9.09 6.15   HGB 10.0* 9.8*   HCT 31.2* 31.4*    196     CMP:   Recent Labs   Lab 03/12/22  1814 03/13/22  1458    138   K 3.6 3.8    104   CO2 24 24   GLU 89 93   BUN 14 13   CREATININE 1.0 1.1   CALCIUM 8.6* 8.6*   PROT 7.6 7.2   ALBUMIN 3.2* 2.9*   BILITOT 0.5 0.7   ALKPHOS 89 87   AST 22 23   ALT 30 30   ANIONGAP 9 10   EGFRNONAA >60.0 >60.0       Significant Imaging: I have reviewed all pertinent imaging results/findings within the past 24 hours.

## 2022-03-13 NOTE — PROGRESS NOTES
[facility] Medicine  Progress Note    Patient Name: Roya Leone  MRN: [id number]  Patient Class: OP- Observation   Admission Date: 3/12/2022  Length of Stay: 0 days  Attending Physician: Kumar Prajapati MD  Primary Care Provider: Primary Doctor No        Subjective:     Principal Problem:Cellulitis of right lower extremity        HPI:  No notes on file    Overview/Hospital Course:  03/13  On Iv abx  No other issues  Low grade fever noted       Interval History:     Review of Systems   Constitutional:  Negative for activity change and appetite change.   HENT:  Negative for congestion and dental problem.    Eyes:  Negative for discharge and itching.   Respiratory:  Negative for shortness of breath.    Cardiovascular:  Negative for chest pain.   Gastrointestinal:  Negative for abdominal distention and abdominal pain.   Endocrine: Negative for cold intolerance.   Genitourinary:  Negative for difficulty urinating and dysuria.   Musculoskeletal:  Negative for arthralgias and back pain.   Skin:  Positive for color change and rash.   Neurological:  Negative for dizziness and facial asymmetry.   Hematological:  Negative for adenopathy.   Psychiatric/Behavioral:  Negative for agitation and behavioral problems.    Objective:     Vital Signs (Most Recent):  Temp: 99.2 °F (37.3 °C) (03/13/22 1734)  Pulse: 82 (03/13/22 1734)  Resp: 18 (03/13/22 1734)  BP: 126/74 (03/13/22 1734)  SpO2: (!) 94 % (03/13/22 1734)   Vital Signs (24h Range):  Temp:  [98.5 °F (36.9 °C)-99.2 °F (37.3 °C)] 99.2 °F (37.3 °C)  Pulse:  [72-84] 82  Resp:  [16-32] 18  SpO2:  [94 %-99 %] 94 %  BP: ()/(53-81) 126/74     Weight: (!) 149 kg (328 lb 7.8 oz)  Body mass index is 54.73 kg/m².    Intake/Output Summary (Last 24 hours) at 3/13/2022 1844  Last data filed at 3/12/2022 2208  Gross per 24 hour   Intake 1672 ml   Output 300 ml   Net 1372 ml      Physical Exam  Vitals and nursing note reviewed.   Constitutional:        General: She is not in acute distress.  HENT:      Head: Atraumatic.      Right Ear: External ear normal.      Left Ear: External ear normal.      Nose: Nose normal.      Mouth/Throat:      Mouth: Mucous membranes are moist.   Eyes:      General: No scleral icterus.  Cardiovascular:      Rate and Rhythm: Normal rate.   Pulmonary:      Effort: Pulmonary effort is normal.   Abdominal:      General: There is no distension.   Musculoskeletal:         General: Normal range of motion.      Cervical back: Normal range of motion.   Skin:     General: Skin is warm.      Comments: RLE rash/cellulitis    Neurological:      Mental Status: She is alert and oriented to person, place, and time.       Significant Labs: All pertinent labs within the past 24 hours have been reviewed.  CBC:   Recent Labs   Lab 03/12/22  1814 03/13/22  1458   WBC 9.09 6.15   HGB 10.0* 9.8*   HCT 31.2* 31.4*    196     CMP:   Recent Labs   Lab 03/12/22  1814 03/13/22  1458    138   K 3.6 3.8    104   CO2 24 24   GLU 89 93   BUN 14 13   CREATININE 1.0 1.1   CALCIUM 8.6* 8.6*   PROT 7.6 7.2   ALBUMIN 3.2* 2.9*   BILITOT 0.5 0.7   ALKPHOS 89 87   AST 22 23   ALT 30 30   ANIONGAP 9 10   EGFRNONAA >60.0 >60.0       Significant Imaging: I have reviewed all pertinent imaging results/findings within the past 24 hours.      Assessment/Plan:      * Cellulitis of right lower extremity  Continue iv zosyn/iv vancomycin  Failed OP Doxycycline Rx  CT showed Mild-to-moderate subcutaneous edema in the right lower leg, most pronounced in the calf region, with associated skin thickening,suggests cellulitis.   There is no evidence of soft tissue abscess, soft tissue gas, or intramuscular compartment fluid collection      Morbid obesity  Need therapeutic life style measures        VTE Risk Mitigation (From admission, onward)         Ordered     enoxaparin injection 40 mg  Every 12 hours         03/12/22 2053     IP VTE HIGH RISK PATIENT  Once          03/12/22 2053     Place sequential compression device  Until discontinued         03/12/22 2053                Discharge Planning   JADYN:      Code Status: Full Code   Is the patient medically ready for discharge?:     Reason for patient still in hospital (select all that apply): Treatment                     Kumar Prajapati MD  Department of Hospital Medicine   [facility]

## 2022-03-14 PROBLEM — F17.200 SMOKER: Status: ACTIVE | Noted: 2022-03-14

## 2022-03-14 LAB
ALBUMIN SERPL BCP-MCNC: 2.5 G/DL (ref 3.5–5.2)
ALP SERPL-CCNC: 78 U/L (ref 55–135)
ALT SERPL W/O P-5'-P-CCNC: 25 U/L (ref 10–44)
ANION GAP SERPL CALC-SCNC: 6 MMOL/L (ref 8–16)
AST SERPL-CCNC: 19 U/L (ref 10–40)
BASOPHILS # BLD AUTO: 0.05 K/UL (ref 0–0.2)
BASOPHILS NFR BLD: 0.8 % (ref 0–1.9)
BILIRUB SERPL-MCNC: 0.4 MG/DL (ref 0.1–1)
BUN SERPL-MCNC: 11 MG/DL (ref 6–20)
CALCIUM SERPL-MCNC: 8 MG/DL (ref 8.7–10.5)
CHLORIDE SERPL-SCNC: 104 MMOL/L (ref 95–110)
CO2 SERPL-SCNC: 25 MMOL/L (ref 23–29)
CREAT SERPL-MCNC: 1 MG/DL (ref 0.5–1.4)
DIFFERENTIAL METHOD: ABNORMAL
EOSINOPHIL # BLD AUTO: 0.2 K/UL (ref 0–0.5)
EOSINOPHIL NFR BLD: 3 % (ref 0–8)
ERYTHROCYTE [DISTWIDTH] IN BLOOD BY AUTOMATED COUNT: 14.3 % (ref 11.5–14.5)
EST. GFR  (AFRICAN AMERICAN): >60 ML/MIN/1.73 M^2
EST. GFR  (NON AFRICAN AMERICAN): >60 ML/MIN/1.73 M^2
GLUCOSE SERPL-MCNC: 105 MG/DL (ref 70–110)
HCT VFR BLD AUTO: 29.2 % (ref 37–48.5)
HGB BLD-MCNC: 8.9 G/DL (ref 12–16)
IMM GRANULOCYTES # BLD AUTO: 0.28 K/UL (ref 0–0.04)
IMM GRANULOCYTES NFR BLD AUTO: 4.4 % (ref 0–0.5)
LYMPHOCYTES # BLD AUTO: 1.4 K/UL (ref 1–4.8)
LYMPHOCYTES NFR BLD: 21.9 % (ref 18–48)
MCH RBC QN AUTO: 26.9 PG (ref 27–31)
MCHC RBC AUTO-ENTMCNC: 30.5 G/DL (ref 32–36)
MCV RBC AUTO: 88 FL (ref 82–98)
MONOCYTES # BLD AUTO: 0.7 K/UL (ref 0.3–1)
MONOCYTES NFR BLD: 11.7 % (ref 4–15)
NEUTROPHILS # BLD AUTO: 3.7 K/UL (ref 1.8–7.7)
NEUTROPHILS NFR BLD: 58.2 % (ref 38–73)
NRBC BLD-RTO: 0 /100 WBC
PLATELET # BLD AUTO: 226 K/UL (ref 150–450)
PMV BLD AUTO: 10.1 FL (ref 9.2–12.9)
POTASSIUM SERPL-SCNC: 3.5 MMOL/L (ref 3.5–5.1)
PROT SERPL-MCNC: 6.3 G/DL (ref 6–8.4)
RBC # BLD AUTO: 3.31 M/UL (ref 4–5.4)
SODIUM SERPL-SCNC: 135 MMOL/L (ref 136–145)
VANCOMYCIN TROUGH SERPL-MCNC: 19.7 UG/ML
WBC # BLD AUTO: 6.34 K/UL (ref 3.9–12.7)

## 2022-03-14 PROCEDURE — 25500020 PHARM REV CODE 255: Performed by: INTERNAL MEDICINE

## 2022-03-14 PROCEDURE — 80053 COMPREHEN METABOLIC PANEL: CPT | Performed by: NURSE PRACTITIONER

## 2022-03-14 PROCEDURE — 36415 COLL VENOUS BLD VENIPUNCTURE: CPT | Performed by: INTERNAL MEDICINE

## 2022-03-14 PROCEDURE — 96366 THER/PROPH/DIAG IV INF ADDON: CPT

## 2022-03-14 PROCEDURE — S4991 NICOTINE PATCH NONLEGEND: HCPCS | Performed by: INTERNAL MEDICINE

## 2022-03-14 PROCEDURE — 25000003 PHARM REV CODE 250: Performed by: NURSE PRACTITIONER

## 2022-03-14 PROCEDURE — 99223 PR INITIAL HOSPITAL CARE,LEVL III: ICD-10-PCS | Mod: ,,, | Performed by: SURGERY

## 2022-03-14 PROCEDURE — 25000003 PHARM REV CODE 250: Performed by: INTERNAL MEDICINE

## 2022-03-14 PROCEDURE — 96372 THER/PROPH/DIAG INJ SC/IM: CPT

## 2022-03-14 PROCEDURE — 63600175 PHARM REV CODE 636 W HCPCS: Performed by: INTERNAL MEDICINE

## 2022-03-14 PROCEDURE — 99223 1ST HOSP IP/OBS HIGH 75: CPT | Mod: ,,, | Performed by: SURGERY

## 2022-03-14 PROCEDURE — 12000002 HC ACUTE/MED SURGE SEMI-PRIVATE ROOM

## 2022-03-14 PROCEDURE — 63600175 PHARM REV CODE 636 W HCPCS: Performed by: NURSE PRACTITIONER

## 2022-03-14 PROCEDURE — 80202 ASSAY OF VANCOMYCIN: CPT | Performed by: INTERNAL MEDICINE

## 2022-03-14 PROCEDURE — 85025 COMPLETE CBC W/AUTO DIFF WBC: CPT | Performed by: NURSE PRACTITIONER

## 2022-03-14 PROCEDURE — 36415 COLL VENOUS BLD VENIPUNCTURE: CPT | Performed by: NURSE PRACTITIONER

## 2022-03-14 RX ORDER — IBUPROFEN 200 MG
1 TABLET ORAL DAILY
Status: DISCONTINUED | OUTPATIENT
Start: 2022-03-14 | End: 2022-03-17 | Stop reason: HOSPADM

## 2022-03-14 RX ADMIN — HYDROCODONE BITARTRATE AND ACETAMINOPHEN 1 TABLET: 5; 325 TABLET ORAL at 08:03

## 2022-03-14 RX ADMIN — IOHEXOL 100 ML: 350 INJECTION, SOLUTION INTRAVENOUS at 06:03

## 2022-03-14 RX ADMIN — ONDANSETRON 8 MG: 4 TABLET, ORALLY DISINTEGRATING ORAL at 10:03

## 2022-03-14 RX ADMIN — ENOXAPARIN SODIUM 40 MG: 40 INJECTION SUBCUTANEOUS at 08:03

## 2022-03-14 RX ADMIN — AMITRIPTYLINE HYDROCHLORIDE 25 MG: 25 TABLET, FILM COATED ORAL at 08:03

## 2022-03-14 RX ADMIN — PIPERACILLIN SODIUM AND TAZOBACTAM SODIUM 4.5 G: 4; .5 INJECTION, POWDER, LYOPHILIZED, FOR SOLUTION INTRAVENOUS at 09:03

## 2022-03-14 RX ADMIN — NICOTINE 1 PATCH: 14 PATCH, EXTENDED RELEASE TRANSDERMAL at 02:03

## 2022-03-14 RX ADMIN — PIPERACILLIN SODIUM AND TAZOBACTAM SODIUM 4.5 G: 4; .5 INJECTION, POWDER, LYOPHILIZED, FOR SOLUTION INTRAVENOUS at 04:03

## 2022-03-14 RX ADMIN — TIZANIDINE 4 MG: 4 TABLET ORAL at 08:03

## 2022-03-14 RX ADMIN — TRAZODONE HYDROCHLORIDE 100 MG: 50 TABLET ORAL at 08:03

## 2022-03-14 RX ADMIN — VANCOMYCIN HYDROCHLORIDE 2000 MG: 500 INJECTION, POWDER, LYOPHILIZED, FOR SOLUTION INTRAVENOUS at 02:03

## 2022-03-14 NOTE — CONSULTS
[facility]  General Surgery  Consult Note    Inpatient consult to General Surgery  Consult performed by: Alvaro Paige III, MD  Consult ordered by: Kumar Prajapati MD  Reason for consult: right calf cellultis         Subjective:     Chief Complaint/Reason for Admission: right calf cellulitis     History of Present Illness: 41 year old female admitted with right leg cellulitis. She originally presented with increasing right leg swelling, pain and fever at home 3/10. She was seen and discharged on antibiotics. She returned on 3/12 with worsening pain and swelling in the leg with worsening erythema.  She was admitted.  Her p.o. doxycycline was stopped and she was started on IV vanc and Zosyn.  She states that she has not had much improvement.  She still feels very similar.  CT of the right leg performed on admission showed thickening of the skin and cellulitis without any obvious fluid collection.  She states she was afebrile to 103 at home.  She has been afebrile last 24 hours.  She is hemodynamically stable.  He states that a couple days before her fall she ran into a pole and fell to the ground.  She states she did not hit her leg very hard.  She states she did not notice any obvious scratches to the back of her leg.  She does have history of lower leg orthopedic surgery when she was a small kid.  She has a medial and lateral longitudinal scar similar to what may be fasciotomy incisions.  She has past medical history of obesity with BMI of 54.7.  Denies any diabetes.  Her glucose has been within normal range here at the hospital.    No current facility-administered medications on file prior to encounter.     Current Outpatient Medications on File Prior to Encounter   Medication Sig    amitriptyline (ELAVIL) 25 MG tablet Take 25 mg by mouth every evening.    dicyclomine (BENTYL) 20 mg tablet Take 1 tablet (20 mg total) by mouth 2 (two) times daily. for 10 days    doxycycline (VIBRAMYCIN) 100 MG Cap  Take 1 capsule (100 mg total) by mouth 2 (two) times daily. for 10 days    fluticasone propionate (FLONASE) 50 mcg/actuation nasal spray 1 spray by Each Nostril route once daily.    LIDOcaine (LIDODERM) 5 % Place 1 patch onto the skin every 12 (twelve) hours.    naproxen (NAPROSYN) 500 MG tablet Take 500 mg by mouth 2 (two) times daily.    ondansetron (ZOFRAN-ODT) 4 MG TbDL Take 1 tablet (4 mg total) by mouth every 8 (eight) hours as needed (nausea).    potassium chloride (KLOR-CON) 10 MEQ TbSR Take 20 mEq by mouth once daily.    tiZANidine (ZANAFLEX) 4 MG tablet Take 4 mg by mouth nightly.    traMADoL (ULTRAM) 50 mg tablet Take 50 mg by mouth 3 (three) times daily as needed.    traZODone (DESYREL) 100 MG tablet Take 100 mg by mouth every evening.    [DISCONTINUED] furosemide (LASIX) 40 MG tablet Take 40 mg by mouth 2 (two) times daily.    [DISCONTINUED] gabapentin (NEURONTIN) 300 MG capsule Take 300 mg by mouth 3 (three) times daily.    [DISCONTINUED] omeprazole (PRILOSEC) 40 MG capsule Take 40 mg by mouth once daily.    [DISCONTINUED] propranolol (INDERAL) 20 MG tablet Take 20 mg by mouth 2 (two) times daily.     [DISCONTINUED] sumatriptan (IMITREX) 25 MG Tab Take 25 mg by mouth.       Review of patient's allergies indicates:  No Known Allergies    Past Medical History:   Diagnosis Date    Anxiety     Depression     Migraine headache     Morbid obesity     Renal disorder     kidney stone     Past Surgical History:   Procedure Laterality Date    CHOLECYSTECTOMY      FRACTURE SURGERY  1984    right lower leg reconstruction surgery s/p trauma injury    TUBAL LIGATION       Family History     Problem Relation (Age of Onset)    Heart disease Mother        Tobacco Use    Smoking status: Current Every Day Smoker     Packs/day: 1.00     Types: Cigarettes    Smokeless tobacco: Never Used   Substance and Sexual Activity    Alcohol use: No    Drug use: Never    Sexual activity: Yes     Birth  control/protection: None     Review of Systems   Constitutional: Positive for fever. Negative for appetite change, chills and unexpected weight change.   HENT: Negative for hearing loss, rhinorrhea, sore throat and voice change.    Eyes: Negative for photophobia and visual disturbance.   Respiratory: Negative for cough, choking and shortness of breath.    Cardiovascular: Negative for chest pain, palpitations and leg swelling.   Gastrointestinal: Negative for abdominal pain, blood in stool, constipation, diarrhea, nausea and vomiting.   Endocrine: Negative for cold intolerance, heat intolerance, polydipsia and polyuria.   Musculoskeletal: Negative for arthralgias, back pain, joint swelling and neck stiffness.   Skin: Positive for color change and rash. Negative for pallor.   Neurological: Negative for dizziness, seizures, syncope and headaches.   Hematological: Negative for adenopathy. Does not bruise/bleed easily.   Psychiatric/Behavioral: Negative for agitation, behavioral problems and confusion.     Objective:     Vital Signs (Most Recent):  Temp: 98.4 °F (36.9 °C) (03/14/22 0731)  Pulse: 87 (03/14/22 0731)  Resp: 18 (03/14/22 0810)  BP: 119/71 (03/14/22 0731)  SpO2: 95 % (03/14/22 0731) Vital Signs (24h Range):  Temp:  [97.3 °F (36.3 °C)-99.2 °F (37.3 °C)] 98.4 °F (36.9 °C)  Pulse:  [81-90] 87  Resp:  [18] 18  SpO2:  [94 %-98 %] 95 %  BP: (113-139)/(63-74) 119/71     Weight: (!) 149 kg (328 lb 7.8 oz)  Body mass index is 54.73 kg/m².      Intake/Output Summary (Last 24 hours) at 3/14/2022 1500  Last data filed at 3/14/2022 0000  Gross per 24 hour   Intake --   Output 2 ml   Net -2 ml       Physical Exam  Constitutional:       General: She is not in acute distress.     Appearance: She is morbidly obese. She is not toxic-appearing.   HENT:      Head: Normocephalic and atraumatic.   Pulmonary:      Effort: Pulmonary effort is normal. No tachypnea, bradypnea or accessory muscle usage.   Abdominal:      General: There  is no distension.      Palpations: Abdomen is soft.      Tenderness: There is no abdominal tenderness. There is no guarding or rebound.   Musculoskeletal:      Cervical back: Neck supple.      Right lower leg: Swelling present. Edema present.        Legs:    Skin:     Comments: She has lymphedema involving the right lower extremity from the knee down to the ankle.  She has bruising, blistering over the entire posterior calf.  Cellulitis involves the posterior half of her lower leg from the top of her calf to the ankle  Did not appreciate any fluctuance.  Although exam somewhat difficult due to habitus and lymphedema   Neurological:      Mental Status: She is alert and oriented to person, place, and time.   Psychiatric:         Behavior: Behavior is cooperative.         Significant Labs:  CBC:   Recent Labs   Lab 03/14/22  0444   WBC 6.34   RBC 3.31*   HGB 8.9*   HCT 29.2*      MCV 88   MCH 26.9*   MCHC 30.5*     CMP:   Recent Labs   Lab 03/14/22  0444      CALCIUM 8.0*   ALBUMIN 2.5*   PROT 6.3   *   K 3.5   CO2 25      BUN 11   CREATININE 1.0   ALKPHOS 78   ALT 25   AST 19   BILITOT 0.4       Significant Diagnostics:  I have reviewed all pertinent imaging results/findings within the past 24 hours.    Assessment/Plan:     Active Diagnoses:    Diagnosis Date Noted POA    PRINCIPAL PROBLEM:  Cellulitis of right lower extremity [L03.115] 01/21/2017 Yes    Smoker [F17.200] 03/14/2022 Unknown    Morbid obesity [E66.01] 03/13/2022 Unknown      Problems Resolved During this Admission:     -she appears to have cellulitis superimposed on lymphedema.  Did not definitely appreciate any obvious abscess on exam.  She is having some bruising through the entire posterior lower leg and some blistering at superior aspect of the calf.  Will reorder CT of the leg to get repeat look now 48 hours later.  She is on vancomycin Zosyn.  Will continue to follow.  Thank you for your consult.     Alvaro Paige  III, MD  General Surgery  [facility]

## 2022-03-14 NOTE — ASSESSMENT & PLAN NOTE
Continue iv zosyn/iv vancomycin  Failed OP Doxycycline Rx  CT showed Mild-to-moderate subcutaneous edema in the right lower leg, most pronounced in the calf region, with associated skin thickening,suggests cellulitis.   There is no evidence of soft tissue abscess, soft tissue gas, or intramuscular compartment fluid collection  Patients symptoms not getting better and cellulitis still persists. Cannot able to go home  Consult surgical team to r/o any abscess

## 2022-03-14 NOTE — PROGRESS NOTES
[facility] Medicine  Progress Note    Patient Name: Roya Leone  MRN: [id number]  Patient Class: IP- Inpatient   Admission Date: 3/12/2022  Length of Stay: 0 days  Attending Physician: Kumar Prajapati MD  Primary Care Provider: Primary Doctor No        Subjective:     Principal Problem:Cellulitis of right lower extremity        HPI:  No notes on file    Overview/Hospital Course:  03/13  On Iv abx  No other issues  Low grade fever noted     03/14  R leg cellulitis not getting better  Pt has significant pain on lying position and when she moves around      Interval History:     Review of Systems   Constitutional:  Negative for activity change and appetite change.   HENT:  Negative for congestion and dental problem.    Eyes:  Negative for discharge and itching.   Respiratory:  Negative for shortness of breath.    Cardiovascular:  Negative for chest pain.   Gastrointestinal:  Negative for abdominal distention and abdominal pain.   Endocrine: Negative for cold intolerance.   Genitourinary:  Negative for difficulty urinating and dysuria.   Musculoskeletal:  Negative for arthralgias and back pain.   Skin:  Positive for rash and wound. Negative for color change.   Neurological:  Negative for dizziness and facial asymmetry.   Hematological:  Negative for adenopathy.   Psychiatric/Behavioral:  Negative for agitation and behavioral problems.    Objective:     Vital Signs (Most Recent):  Temp: 98.4 °F (36.9 °C) (03/14/22 0731)  Pulse: 87 (03/14/22 0731)  Resp: 18 (03/14/22 0810)  BP: 119/71 (03/14/22 0731)  SpO2: 95 % (03/14/22 0731) Vital Signs (24h Range):  Temp:  [97.3 °F (36.3 °C)-99.2 °F (37.3 °C)] 98.4 °F (36.9 °C)  Pulse:  [81-90] 87  Resp:  [18] 18  SpO2:  [94 %-98 %] 95 %  BP: (113-139)/(63-74) 119/71     Weight: (!) 149 kg (328 lb 7.8 oz)  Body mass index is 54.73 kg/m².    Intake/Output Summary (Last 24 hours) at 3/14/2022 1343  Last data filed at 3/14/2022 0000  Gross per 24 hour    Intake --   Output 2 ml   Net -2 ml      Physical Exam  Vitals and nursing note reviewed.   Constitutional:       General: She is not in acute distress.  HENT:      Head: Atraumatic.      Right Ear: External ear normal.      Left Ear: External ear normal.      Nose: Nose normal.      Mouth/Throat:      Mouth: Mucous membranes are moist.   Eyes:      General: No scleral icterus.  Cardiovascular:      Rate and Rhythm: Normal rate.   Pulmonary:      Effort: Pulmonary effort is normal.   Musculoskeletal:         General: Normal range of motion.      Cervical back: Normal range of motion.   Skin:     General: Skin is warm.      Comments: RLE cellulitis    Neurological:      Mental Status: She is alert and oriented to person, place, and time.       Significant Labs: All pertinent labs within the past 24 hours have been reviewed.  CBC:   Recent Labs   Lab 03/12/22 1814 03/13/22  1458 03/14/22  0444   WBC 9.09 6.15 6.34   HGB 10.0* 9.8* 8.9*   HCT 31.2* 31.4* 29.2*    196 226     CMP:   Recent Labs   Lab 03/12/22 1814 03/13/22  1458 03/14/22  0444    138 135*   K 3.6 3.8 3.5    104 104   CO2 24 24 25   GLU 89 93 105   BUN 14 13 11   CREATININE 1.0 1.1 1.0   CALCIUM 8.6* 8.6* 8.0*   PROT 7.6 7.2 6.3   ALBUMIN 3.2* 2.9* 2.5*   BILITOT 0.5 0.7 0.4   ALKPHOS 89 87 78   AST 22 23 19   ALT 30 30 25   ANIONGAP 9 10 6*   EGFRNONAA >60.0 >60.0 >60.0       Significant Imaging: I have reviewed all pertinent imaging results/findings within the past 24 hours.      Assessment/Plan:      * Cellulitis of right lower extremity  Continue iv zosyn/iv vancomycin  Failed OP Doxycycline Rx  CT showed Mild-to-moderate subcutaneous edema in the right lower leg, most pronounced in the calf region, with associated skin thickening,suggests cellulitis.   There is no evidence of soft tissue abscess, soft tissue gas, or intramuscular compartment fluid collection  Patients symptoms not getting better and cellulitis still persists.  Cannot able to go home  Consult surgical team to r/o any abscess       Smoker  Now on nicotine patch      Morbid obesity  Need therapeutic life style measures        VTE Risk Mitigation (From admission, onward)         Ordered     enoxaparin injection 40 mg  Every 12 hours         03/12/22 2053     IP VTE HIGH RISK PATIENT  Once         03/12/22 2053     Place sequential compression device  Until discontinued         03/12/22 2053                Discharge Planning   JADYN:      Code Status: Full Code   Is the patient medically ready for discharge?:     Reason for patient still in hospital (select all that apply): Treatment                     Kumar Prajapati MD  Department of Hospital Medicine   [facility]

## 2022-03-14 NOTE — PLAN OF CARE
No acute issues noted overnight.   Problem: Adult Inpatient Plan of Care  Goal: Plan of Care Review  Outcome: Ongoing, Progressing  Goal: Patient-Specific Goal (Individualized)  Outcome: Ongoing, Progressing  Goal: Optimal Comfort and Wellbeing  Outcome: Ongoing, Progressing  Goal: Readiness for Transition of Care  Outcome: Ongoing, Progressing     Problem: Skin Injury Risk Increased  Goal: Skin Health and Integrity  Outcome: Ongoing, Progressing

## 2022-03-14 NOTE — SUBJECTIVE & OBJECTIVE
Interval History:     Review of Systems   Constitutional:  Negative for activity change and appetite change.   HENT:  Negative for congestion and dental problem.    Eyes:  Negative for discharge and itching.   Respiratory:  Negative for shortness of breath.    Cardiovascular:  Negative for chest pain.   Gastrointestinal:  Negative for abdominal distention and abdominal pain.   Endocrine: Negative for cold intolerance.   Genitourinary:  Negative for difficulty urinating and dysuria.   Musculoskeletal:  Negative for arthralgias and back pain.   Skin:  Positive for rash and wound. Negative for color change.   Neurological:  Negative for dizziness and facial asymmetry.   Hematological:  Negative for adenopathy.   Psychiatric/Behavioral:  Negative for agitation and behavioral problems.    Objective:     Vital Signs (Most Recent):  Temp: 98.4 °F (36.9 °C) (03/14/22 0731)  Pulse: 87 (03/14/22 0731)  Resp: 18 (03/14/22 0810)  BP: 119/71 (03/14/22 0731)  SpO2: 95 % (03/14/22 0731) Vital Signs (24h Range):  Temp:  [97.3 °F (36.3 °C)-99.2 °F (37.3 °C)] 98.4 °F (36.9 °C)  Pulse:  [81-90] 87  Resp:  [18] 18  SpO2:  [94 %-98 %] 95 %  BP: (113-139)/(63-74) 119/71     Weight: (!) 149 kg (328 lb 7.8 oz)  Body mass index is 54.73 kg/m².    Intake/Output Summary (Last 24 hours) at 3/14/2022 1343  Last data filed at 3/14/2022 0000  Gross per 24 hour   Intake --   Output 2 ml   Net -2 ml      Physical Exam  Vitals and nursing note reviewed.   Constitutional:       General: She is not in acute distress.  HENT:      Head: Atraumatic.      Right Ear: External ear normal.      Left Ear: External ear normal.      Nose: Nose normal.      Mouth/Throat:      Mouth: Mucous membranes are moist.   Eyes:      General: No scleral icterus.  Cardiovascular:      Rate and Rhythm: Normal rate.   Pulmonary:      Effort: Pulmonary effort is normal.   Musculoskeletal:         General: Normal range of motion.      Cervical back: Normal range of motion.    Skin:     General: Skin is warm.      Comments: RLE cellulitis    Neurological:      Mental Status: She is alert and oriented to person, place, and time.       Significant Labs: All pertinent labs within the past 24 hours have been reviewed.  CBC:   Recent Labs   Lab 03/12/22 1814 03/13/22  1458 03/14/22  0444   WBC 9.09 6.15 6.34   HGB 10.0* 9.8* 8.9*   HCT 31.2* 31.4* 29.2*    196 226     CMP:   Recent Labs   Lab 03/12/22  1814 03/13/22  1458 03/14/22  0444    138 135*   K 3.6 3.8 3.5    104 104   CO2 24 24 25   GLU 89 93 105   BUN 14 13 11   CREATININE 1.0 1.1 1.0   CALCIUM 8.6* 8.6* 8.0*   PROT 7.6 7.2 6.3   ALBUMIN 3.2* 2.9* 2.5*   BILITOT 0.5 0.7 0.4   ALKPHOS 89 87 78   AST 22 23 19   ALT 30 30 25   ANIONGAP 9 10 6*   EGFRNONAA >60.0 >60.0 >60.0       Significant Imaging: I have reviewed all pertinent imaging results/findings within the past 24 hours.

## 2022-03-15 PROBLEM — F39 MOOD DISORDER: Status: ACTIVE | Noted: 2022-03-15

## 2022-03-15 LAB
ALBUMIN SERPL BCP-MCNC: 2.8 G/DL (ref 3.5–5.2)
ALP SERPL-CCNC: 82 U/L (ref 55–135)
ALT SERPL W/O P-5'-P-CCNC: 23 U/L (ref 10–44)
ANION GAP SERPL CALC-SCNC: 7 MMOL/L (ref 8–16)
AST SERPL-CCNC: 20 U/L (ref 10–40)
BASOPHILS NFR BLD: 0 % (ref 0–1.9)
BILIRUB SERPL-MCNC: 0.5 MG/DL (ref 0.1–1)
BUN SERPL-MCNC: 7 MG/DL (ref 6–20)
CALCIUM SERPL-MCNC: 8.5 MG/DL (ref 8.7–10.5)
CHLORIDE SERPL-SCNC: 104 MMOL/L (ref 95–110)
CO2 SERPL-SCNC: 28 MMOL/L (ref 23–29)
CREAT SERPL-MCNC: 1.2 MG/DL (ref 0.5–1.4)
DIFFERENTIAL METHOD: ABNORMAL
EOSINOPHIL NFR BLD: 2 % (ref 0–8)
ERYTHROCYTE [DISTWIDTH] IN BLOOD BY AUTOMATED COUNT: 14 % (ref 11.5–14.5)
EST. GFR  (AFRICAN AMERICAN): >60 ML/MIN/1.73 M^2
EST. GFR  (NON AFRICAN AMERICAN): 56.3 ML/MIN/1.73 M^2
GLUCOSE SERPL-MCNC: 112 MG/DL (ref 70–110)
HCT VFR BLD AUTO: 28.4 % (ref 37–48.5)
HGB BLD-MCNC: 9.1 G/DL (ref 12–16)
IMM GRANULOCYTES # BLD AUTO: ABNORMAL K/UL (ref 0–0.04)
IMM GRANULOCYTES NFR BLD AUTO: ABNORMAL % (ref 0–0.5)
LYMPHOCYTES NFR BLD: 16 % (ref 18–48)
MCH RBC QN AUTO: 26.9 PG (ref 27–31)
MCHC RBC AUTO-ENTMCNC: 32 G/DL (ref 32–36)
MCV RBC AUTO: 84 FL (ref 82–98)
METAMYELOCYTES NFR BLD MANUAL: 2 %
MONOCYTES NFR BLD: 4 % (ref 4–15)
MRSA SCREEN BY PCR: NEGATIVE
NEUTROPHILS NFR BLD: 68 % (ref 38–73)
NEUTS BAND NFR BLD MANUAL: 8 %
NRBC BLD-RTO: 0 /100 WBC
PLATELET # BLD AUTO: 268 K/UL (ref 150–450)
PLATELET BLD QL SMEAR: ABNORMAL
PMV BLD AUTO: 9.5 FL (ref 9.2–12.9)
POTASSIUM SERPL-SCNC: 3.3 MMOL/L (ref 3.5–5.1)
POTASSIUM SERPL-SCNC: 3.4 MMOL/L (ref 3.5–5.1)
PROT SERPL-MCNC: 6.9 G/DL (ref 6–8.4)
RBC # BLD AUTO: 3.38 M/UL (ref 4–5.4)
SODIUM SERPL-SCNC: 139 MMOL/L (ref 136–145)
VANCOMYCIN TROUGH SERPL-MCNC: 26.4 UG/ML
WBC # BLD AUTO: 7.32 K/UL (ref 3.9–12.7)

## 2022-03-15 PROCEDURE — 80202 ASSAY OF VANCOMYCIN: CPT | Performed by: INTERNAL MEDICINE

## 2022-03-15 PROCEDURE — 25000003 PHARM REV CODE 250: Performed by: NURSE PRACTITIONER

## 2022-03-15 PROCEDURE — 12000002 HC ACUTE/MED SURGE SEMI-PRIVATE ROOM

## 2022-03-15 PROCEDURE — 85027 COMPLETE CBC AUTOMATED: CPT | Performed by: NURSE PRACTITIONER

## 2022-03-15 PROCEDURE — 63600175 PHARM REV CODE 636 W HCPCS: Performed by: NURSE PRACTITIONER

## 2022-03-15 PROCEDURE — 87641 MR-STAPH DNA AMP PROBE: CPT | Performed by: STUDENT IN AN ORGANIZED HEALTH CARE EDUCATION/TRAINING PROGRAM

## 2022-03-15 PROCEDURE — 25000003 PHARM REV CODE 250: Performed by: INTERNAL MEDICINE

## 2022-03-15 PROCEDURE — 80053 COMPREHEN METABOLIC PANEL: CPT | Performed by: NURSE PRACTITIONER

## 2022-03-15 PROCEDURE — S4991 NICOTINE PATCH NONLEGEND: HCPCS | Performed by: INTERNAL MEDICINE

## 2022-03-15 PROCEDURE — 85007 BL SMEAR W/DIFF WBC COUNT: CPT | Performed by: NURSE PRACTITIONER

## 2022-03-15 PROCEDURE — 63600175 PHARM REV CODE 636 W HCPCS: Performed by: INTERNAL MEDICINE

## 2022-03-15 PROCEDURE — 36415 COLL VENOUS BLD VENIPUNCTURE: CPT | Performed by: NURSE PRACTITIONER

## 2022-03-15 PROCEDURE — 84132 ASSAY OF SERUM POTASSIUM: CPT | Performed by: STUDENT IN AN ORGANIZED HEALTH CARE EDUCATION/TRAINING PROGRAM

## 2022-03-15 RX ORDER — HYDROCODONE BITARTRATE AND ACETAMINOPHEN 10; 325 MG/1; MG/1
1 TABLET ORAL EVERY 6 HOURS PRN
Status: DISCONTINUED | OUTPATIENT
Start: 2022-03-15 | End: 2022-03-17 | Stop reason: HOSPADM

## 2022-03-15 RX ADMIN — HYDROCODONE BITARTRATE AND ACETAMINOPHEN 1 TABLET: 5; 325 TABLET ORAL at 11:03

## 2022-03-15 RX ADMIN — PIPERACILLIN SODIUM AND TAZOBACTAM SODIUM 4.5 G: 4; .5 INJECTION, POWDER, LYOPHILIZED, FOR SOLUTION INTRAVENOUS at 12:03

## 2022-03-15 RX ADMIN — POTASSIUM BICARBONATE 35 MEQ: 391 TABLET, EFFERVESCENT ORAL at 05:03

## 2022-03-15 RX ADMIN — TRAZODONE HYDROCHLORIDE 100 MG: 50 TABLET ORAL at 09:03

## 2022-03-15 RX ADMIN — ONDANSETRON 8 MG: 4 TABLET, ORALLY DISINTEGRATING ORAL at 06:03

## 2022-03-15 RX ADMIN — AMITRIPTYLINE HYDROCHLORIDE 25 MG: 25 TABLET, FILM COATED ORAL at 09:03

## 2022-03-15 RX ADMIN — ONDANSETRON 8 MG: 4 TABLET, ORALLY DISINTEGRATING ORAL at 11:03

## 2022-03-15 RX ADMIN — ENOXAPARIN SODIUM 40 MG: 40 INJECTION SUBCUTANEOUS at 09:03

## 2022-03-15 RX ADMIN — NICOTINE 1 PATCH: 14 PATCH, EXTENDED RELEASE TRANSDERMAL at 09:03

## 2022-03-15 RX ADMIN — TIZANIDINE 4 MG: 4 TABLET ORAL at 09:03

## 2022-03-15 NOTE — SUBJECTIVE & OBJECTIVE
Interval History:  Low-grade temperature elevation overnight to 100.2° F. Patient is able to stand and ambulate in the room, but still has significant tenderness to her right posterior lower extremity.    Review of Systems   Constitutional:  Negative for chills, diaphoresis, fatigue and fever.   HENT:  Negative for drooling.    Respiratory:  Negative for shortness of breath.    Cardiovascular:  Negative for chest pain and leg swelling.   Gastrointestinal:  Negative for abdominal distention and abdominal pain.   Genitourinary:  Negative for dysuria.   Skin:  Positive for rash. Negative for pallor and wound.   Neurological:  Negative for dizziness and headaches.   Objective:     Vital Signs (Most Recent):  Temp: 98.5 °F (36.9 °C) (03/15/22 1109)  Pulse: 85 (03/15/22 1109)  Resp: 20 (03/15/22 1154)  BP: (!) 91/59 (03/15/22 1109)  SpO2: 95 % (03/15/22 1109)   Vital Signs (24h Range):  Temp:  [97.6 °F (36.4 °C)-100.2 °F (37.9 °C)] 98.5 °F (36.9 °C)  Pulse:  [76-88] 85  Resp:  [17-20] 20  SpO2:  [93 %-99 %] 95 %  BP: ()/(52-78) 91/59     Weight: (!) 149 kg (328 lb 7.8 oz)  Body mass index is 54.73 kg/m².    Intake/Output Summary (Last 24 hours) at 3/15/2022 1425  Last data filed at 3/14/2022 1800  Gross per 24 hour   Intake 600 ml   Output --   Net 600 ml     Physical Exam   GENERAL:  No apparent distress. Alert and oriented times three  LUNGS:  No respiratory distress. Clear to auscultation bilaterally with good air movement  CARDIAC:  RRR without murmur, rub or gallop  ABDOMEN:  Positive bowel sounds. Nontender and nondistended. No organomegaly  EXTREMITIES:  Peripheral pulses are 2+.  Bilateral lower extremities are obese.  No pitting edema bilaterally.  SKIN:  Posterior aspect of the patient's leg is erythematous and mildly tender with corrugated skin.  Erythema is receding from the marked boundaries in some areas.  NEUROLOGIC:  CN II-XII intact. Light touch sensation intact. Muscle strength 5/5 in all  extremities       Significant Labs: All pertinent labs within the past 24 hours have been reviewed.  CBC:   Recent Labs   Lab 03/13/22  1458 03/14/22  0444 03/15/22  1135   WBC 6.15 6.34 7.32   HGB 9.8* 8.9* 9.1*   HCT 31.4* 29.2* 28.4*    226 268     CMP:   Recent Labs   Lab 03/13/22  1458 03/14/22  0444 03/15/22  1135    135* 139   K 3.8 3.5 3.3*    104 104   CO2 24 25 28   GLU 93 105 112*   BUN 13 11 7   CREATININE 1.1 1.0 1.2   CALCIUM 8.6* 8.0* 8.5*   PROT 7.2 6.3 6.9   ALBUMIN 2.9* 2.5* 2.8*   BILITOT 0.7 0.4 0.5   ALKPHOS 87 78 82   AST 23 19 20   ALT 30 25 23   ANIONGAP 10 6* 7*   EGFRNONAA >60.0 >60.0 56.3*       Significant Imaging: I have reviewed all pertinent imaging results/findings within the past 24 hours.

## 2022-03-15 NOTE — NURSING
Spoke with Dr. Paige and rec'd ok for pt. to eat.  No procedure needed at this time, will continue antibiotics and monitor.

## 2022-03-15 NOTE — PROGRESS NOTES
Counts include 234 beds at the [facility] Medicine  Progress Note    Patient Name: Roya Leone  MRN: [id number]  Patient Class: IP- Inpatient   Admission Date: 3/12/2022  Length of Stay: 1 days  Attending Physician: Mike Kaufman MD  Primary Care Provider: Primary Doctor No        Subjective:     Principal Problem:Cellulitis of right lower extremity        HPI:  No notes on file    Overview/Hospital Course:  Patient found to have right lower extremity cellulitis due to combination of lymphedema and morbid obesity.  She is started on IV antibiotics.      Interval History:  Low-grade temperature elevation overnight to 100.2° F. Patient is able to stand and ambulate in the room, but still has significant tenderness to her right posterior lower extremity.    Review of Systems   Constitutional:  Negative for chills, diaphoresis, fatigue and fever.   HENT:  Negative for drooling.    Respiratory:  Negative for shortness of breath.    Cardiovascular:  Negative for chest pain and leg swelling.   Gastrointestinal:  Negative for abdominal distention and abdominal pain.   Genitourinary:  Negative for dysuria.   Skin:  Positive for rash. Negative for pallor and wound.   Neurological:  Negative for dizziness and headaches.   Objective:     Vital Signs (Most Recent):  Temp: 98.5 °F (36.9 °C) (03/15/22 1109)  Pulse: 85 (03/15/22 1109)  Resp: 20 (03/15/22 1154)  BP: (!) 91/59 (03/15/22 1109)  SpO2: 95 % (03/15/22 1109)   Vital Signs (24h Range):  Temp:  [97.6 °F (36.4 °C)-100.2 °F (37.9 °C)] 98.5 °F (36.9 °C)  Pulse:  [76-88] 85  Resp:  [17-20] 20  SpO2:  [93 %-99 %] 95 %  BP: ()/(52-78) 91/59     Weight: (!) 149 kg (328 lb 7.8 oz)  Body mass index is 54.73 kg/m².    Intake/Output Summary (Last 24 hours) at 3/15/2022 3025  Last data filed at 3/14/2022 1800  Gross per 24 hour   Intake 600 ml   Output --   Net 600 ml     Physical Exam   GENERAL:  No apparent distress. Alert and oriented times three  LUNGS:  No  respiratory distress. Clear to auscultation bilaterally with good air movement  CARDIAC:  RRR without murmur, rub or gallop  ABDOMEN:  Positive bowel sounds. Nontender and nondistended. No organomegaly  EXTREMITIES:  Peripheral pulses are 2+.  Bilateral lower extremities are obese.  No pitting edema bilaterally.  SKIN:  Posterior aspect of the patient's leg is erythematous and mildly tender with corrugated skin.  Erythema is receding from the marked boundaries in some areas.  NEUROLOGIC:  CN II-XII intact. Light touch sensation intact. Muscle strength 5/5 in all extremities       Significant Labs: All pertinent labs within the past 24 hours have been reviewed.  CBC:   Recent Labs   Lab 03/13/22  1458 03/14/22  0444 03/15/22  1135   WBC 6.15 6.34 7.32   HGB 9.8* 8.9* 9.1*   HCT 31.4* 29.2* 28.4*    226 268     CMP:   Recent Labs   Lab 03/13/22  1458 03/14/22  0444 03/15/22  1135    135* 139   K 3.8 3.5 3.3*    104 104   CO2 24 25 28   GLU 93 105 112*   BUN 13 11 7   CREATININE 1.1 1.0 1.2   CALCIUM 8.6* 8.0* 8.5*   PROT 7.2 6.3 6.9   ALBUMIN 2.9* 2.5* 2.8*   BILITOT 0.7 0.4 0.5   ALKPHOS 87 78 82   AST 23 19 20   ALT 30 25 23   ANIONGAP 10 6* 7*   EGFRNONAA >60.0 >60.0 56.3*       Significant Imaging: I have reviewed all pertinent imaging results/findings within the past 24 hours.    CT lower extremity reviewed    Assessment/Plan:      * Cellulitis of right lower extremity  Repeat CT imaging most consistent with cellulitis.  No obvious fluid collection visualized.  Continue vancomycin  Continue Zosyn  MRSA probe ordered, but has not been yet performed    Given the weight of the patient's leg, bedside trapeze ordered to assist with leg elevation.    Chills surgery will speak with the patient regarding CT results and implications for treatment.      Mood disorder  Continue amitriptyline  Continue tizanidine      Smoker  Now on nicotine patch.  Need for cessation was counseled.      Morbid  obesity          VTE Risk Mitigation (From admission, onward)         Ordered     enoxaparin injection 40 mg  Every 12 hours         03/12/22 2053     IP VTE HIGH RISK PATIENT  Once         03/12/22 2053     Place sequential compression device  Until discontinued         03/12/22 2053                Discharge Planning   JADYN:      Code Status: Full Code   Is the patient medically ready for discharge?:     Reason for patient still in hospital (select all that apply): Consult recommendations  Discharge Plan A: Home                  Mike Kaufman MD  Department of Hospital Medicine   [facility]

## 2022-03-15 NOTE — PLAN OF CARE
[facility]  Initial Discharge Assessment       Primary Care Provider: Primary Doctor No    Admission Diagnosis: Cellulitis of right leg [L03.115]    Admission Date: 3/12/2022  Expected Discharge Date:     Discharge Barriers Identified: Unable to afford medication    Payor: [place] MEDICA[place] / Plan: [place] MED[place]ING / Product Type: Government /     Extended Emergency Contact Information  Primary Emergency Contact: JALEN RAMIREZ  Mobile Phone: [phone]  Relation: Spouse  Preferred language: English   needed? No    Discharge Plan A: Home  Discharge Plan B: Home with family      [id number] - [place], [place] - [address] AT [facility] [address]  [address]  [place] [address]  Phone: [phone] Fax: [phone]    [id number] - [facility] [place] - [address] AT [facility] [address]  [address]  [place] [address]  Phone: [phone] Fax: [phone]    Performed assessment with patient at bedside.  The patient states that she is currently in the hospital for her leg.  Reports that she does not have DME equipment at home currently but may need a walker at discharge. Reports living on first floor of home. She reports that Medicaid covers most of her medications but has trouble affording her Lovenox and Lidocaine.  She states she would like to be referred to outpatient .  Reports that she lives with her spouse Jalen Ramirez [phone].  No other needs at this time.  Case management will continue to follow.     Initial Assessment (most recent)     Adult Discharge Assessment - 03/15/22 1310        Discharge Assessment    Assessment Type Discharge Planning Assessment     Confirmed/corrected address, phone number and insurance Yes     Confirmed Demographics Correct on Facesheet     Source of Information patient     When was your last doctors appointment? 03/08/22     Does patient/caregiver understand  observation status --   n/a    Communicated JADYN with patient/caregiver Date not available/Unable to determine     Reason For Admission CELLULITIS     Lives With spouse     Facility Arrived From: HOME     Do you expect to return to your current living situation? Yes     Do you have help at home or someone to help you manage your care at home? Yes     Who are your caregiver(s) and their phone number(s)? JALEN RMAIREZ [phone]     Prior to hospitilization cognitive status: Alert/Oriented     Current cognitive status: Alert/Oriented     Walking or Climbing Stairs Difficulty --   Patient reports requiring assistance since leg infection    Dressing/Bathing Difficulty --   patient reports requiring assitance with leg infection    Equipment Currently Used at Home none     Readmission within 30 days? No     Patient currently being followed by outpatient case management? --   requesting to be referred to outpatient     Do you currently have service(s) that help you manage your care at home? No     Do you take prescription medications? Yes     Do you have any problems affording any of your prescribed medications? Yes     If yes, what medications? Lovenox and lidocaine     Is the patient taking medications as prescribed? yes     Who is going to help you get home at discharge? Spouse     How do you get to doctors appointments? car, drives self     Are you on dialysis? No     Do you take coumadin? No     Discharge Plan A Home     Discharge Plan B Home with family     DME Needed Upon Discharge  --   case mangement will continue to follow    Discharge Plan discussed with: Patient     Discharge Barriers Identified Unable to afford medication        Relationship/Environment    Name(s) of Who Lives With Patient Jalen Ramirez [phone]

## 2022-03-16 VITALS
SYSTOLIC BLOOD PRESSURE: 101 MMHG | DIASTOLIC BLOOD PRESSURE: 73 MMHG | HEART RATE: 76 BPM | OXYGEN SATURATION: 97 % | HEIGHT: 65 IN | TEMPERATURE: 98 F | WEIGHT: 293 LBS | RESPIRATION RATE: 18 BRPM | BODY MASS INDEX: 48.82 KG/M2

## 2022-03-16 LAB
ALBUMIN SERPL BCP-MCNC: 2.7 G/DL (ref 3.5–5.2)
ALP SERPL-CCNC: 71 U/L (ref 55–135)
ALT SERPL W/O P-5'-P-CCNC: 19 U/L (ref 10–44)
ANION GAP SERPL CALC-SCNC: 7 MMOL/L (ref 8–16)
AST SERPL-CCNC: 19 U/L (ref 10–40)
BASOPHILS # BLD AUTO: ABNORMAL K/UL (ref 0–0.2)
BASOPHILS NFR BLD: 0 % (ref 0–1.9)
BILIRUB SERPL-MCNC: 0.8 MG/DL (ref 0.1–1)
BUN SERPL-MCNC: 7 MG/DL (ref 6–20)
CALCIUM SERPL-MCNC: 8.1 MG/DL (ref 8.7–10.5)
CHLORIDE SERPL-SCNC: 100 MMOL/L (ref 95–110)
CO2 SERPL-SCNC: 28 MMOL/L (ref 23–29)
CREAT SERPL-MCNC: 1.1 MG/DL (ref 0.5–1.4)
DIFFERENTIAL METHOD: ABNORMAL
EOSINOPHIL # BLD AUTO: ABNORMAL K/UL (ref 0–0.5)
EOSINOPHIL NFR BLD: 3 % (ref 0–8)
ERYTHROCYTE [DISTWIDTH] IN BLOOD BY AUTOMATED COUNT: 13.9 % (ref 11.5–14.5)
EST. GFR  (AFRICAN AMERICAN): >60 ML/MIN/1.73 M^2
EST. GFR  (NON AFRICAN AMERICAN): >60 ML/MIN/1.73 M^2
GLUCOSE SERPL-MCNC: 99 MG/DL (ref 70–110)
HCT VFR BLD AUTO: 29.1 % (ref 37–48.5)
HGB BLD-MCNC: 9 G/DL (ref 12–16)
IMM GRANULOCYTES # BLD AUTO: ABNORMAL K/UL (ref 0–0.04)
IMM GRANULOCYTES NFR BLD AUTO: ABNORMAL % (ref 0–0.5)
INR PPP: 1.1
LYMPHOCYTES # BLD AUTO: ABNORMAL K/UL (ref 1–4.8)
LYMPHOCYTES NFR BLD: 28 % (ref 18–48)
MCH RBC QN AUTO: 27 PG (ref 27–31)
MCHC RBC AUTO-ENTMCNC: 30.9 G/DL (ref 32–36)
MCV RBC AUTO: 87 FL (ref 82–98)
METAMYELOCYTES NFR BLD MANUAL: 1 %
MONOCYTES # BLD AUTO: ABNORMAL K/UL (ref 0.3–1)
MONOCYTES NFR BLD: 5 % (ref 4–15)
MYELOCYTES NFR BLD MANUAL: 1 %
NEUTROPHILS NFR BLD: 57 % (ref 38–73)
NEUTS BAND NFR BLD MANUAL: 5 %
NRBC BLD-RTO: 0 /100 WBC
PLATELET # BLD AUTO: 242 K/UL (ref 150–450)
PLATELET BLD QL SMEAR: ABNORMAL
PMV BLD AUTO: 10.4 FL (ref 9.2–12.9)
POTASSIUM SERPL-SCNC: 4.7 MMOL/L (ref 3.5–5.1)
PROT SERPL-MCNC: 6.4 G/DL (ref 6–8.4)
PROTHROMBIN TIME: 13 SEC (ref 11.4–13.7)
RBC # BLD AUTO: 3.33 M/UL (ref 4–5.4)
SODIUM SERPL-SCNC: 135 MMOL/L (ref 136–145)
VANCOMYCIN SERPL-MCNC: 12.7 UG/ML
WBC # BLD AUTO: 8.16 K/UL (ref 3.9–12.7)

## 2022-03-16 PROCEDURE — 63600175 PHARM REV CODE 636 W HCPCS: Performed by: STUDENT IN AN ORGANIZED HEALTH CARE EDUCATION/TRAINING PROGRAM

## 2022-03-16 PROCEDURE — 80202 ASSAY OF VANCOMYCIN: CPT | Performed by: STUDENT IN AN ORGANIZED HEALTH CARE EDUCATION/TRAINING PROGRAM

## 2022-03-16 PROCEDURE — 36415 COLL VENOUS BLD VENIPUNCTURE: CPT | Performed by: STUDENT IN AN ORGANIZED HEALTH CARE EDUCATION/TRAINING PROGRAM

## 2022-03-16 PROCEDURE — 85007 BL SMEAR W/DIFF WBC COUNT: CPT | Performed by: NURSE PRACTITIONER

## 2022-03-16 PROCEDURE — 85027 COMPLETE CBC AUTOMATED: CPT | Performed by: NURSE PRACTITIONER

## 2022-03-16 PROCEDURE — 85610 PROTHROMBIN TIME: CPT | Performed by: STUDENT IN AN ORGANIZED HEALTH CARE EDUCATION/TRAINING PROGRAM

## 2022-03-16 PROCEDURE — 12000002 HC ACUTE/MED SURGE SEMI-PRIVATE ROOM

## 2022-03-16 PROCEDURE — 25000003 PHARM REV CODE 250: Performed by: INTERNAL MEDICINE

## 2022-03-16 PROCEDURE — 25000003 PHARM REV CODE 250: Performed by: STUDENT IN AN ORGANIZED HEALTH CARE EDUCATION/TRAINING PROGRAM

## 2022-03-16 PROCEDURE — 80053 COMPREHEN METABOLIC PANEL: CPT | Performed by: NURSE PRACTITIONER

## 2022-03-16 PROCEDURE — 63600175 PHARM REV CODE 636 W HCPCS: Performed by: NURSE PRACTITIONER

## 2022-03-16 PROCEDURE — S4991 NICOTINE PATCH NONLEGEND: HCPCS | Performed by: INTERNAL MEDICINE

## 2022-03-16 PROCEDURE — 25000003 PHARM REV CODE 250: Performed by: NURSE PRACTITIONER

## 2022-03-16 RX ORDER — HYDROCODONE BITARTRATE AND ACETAMINOPHEN 5; 325 MG/1; MG/1
1 TABLET ORAL EVERY 12 HOURS PRN
Qty: 6 TABLET | Refills: 0 | Status: SHIPPED | OUTPATIENT
Start: 2022-03-16 | End: 2022-03-19

## 2022-03-16 RX ORDER — CLINDAMYCIN HYDROCHLORIDE 150 MG/1
150 CAPSULE ORAL EVERY 6 HOURS
Qty: 20 CAPSULE | Refills: 0 | Status: SHIPPED | OUTPATIENT
Start: 2022-03-16 | End: 2022-03-21

## 2022-03-16 RX ORDER — CEPHALEXIN 500 MG/1
500 CAPSULE ORAL EVERY 6 HOURS
Qty: 20 CAPSULE | Refills: 0 | Status: SHIPPED | OUTPATIENT
Start: 2022-03-16 | End: 2022-03-21

## 2022-03-16 RX ADMIN — POTASSIUM BICARBONATE 35 MEQ: 391 TABLET, EFFERVESCENT ORAL at 05:03

## 2022-03-16 RX ADMIN — HYDROCODONE BITARTRATE AND ACETAMINOPHEN 1 TABLET: 10; 325 TABLET ORAL at 05:03

## 2022-03-16 RX ADMIN — HYDROCODONE BITARTRATE AND ACETAMINOPHEN 1 TABLET: 10; 325 TABLET ORAL at 01:03

## 2022-03-16 RX ADMIN — PIPERACILLIN SODIUM AND TAZOBACTAM SODIUM 4.5 G: 4; .5 INJECTION, POWDER, LYOPHILIZED, FOR SOLUTION INTRAVENOUS at 10:03

## 2022-03-16 RX ADMIN — PIPERACILLIN SODIUM AND TAZOBACTAM SODIUM 4.5 G: 4; .5 INJECTION, POWDER, LYOPHILIZED, FOR SOLUTION INTRAVENOUS at 01:03

## 2022-03-16 RX ADMIN — MAGNESIUM OXIDE 800 MG: 400 TABLET ORAL at 05:03

## 2022-03-16 RX ADMIN — NICOTINE 1 PATCH: 14 PATCH, EXTENDED RELEASE TRANSDERMAL at 09:03

## 2022-03-16 RX ADMIN — VANCOMYCIN HYDROCHLORIDE 2000 MG: 1 INJECTION, POWDER, LYOPHILIZED, FOR SOLUTION INTRAVENOUS at 01:03

## 2022-03-16 RX ADMIN — POTASSIUM BICARBONATE 60 MEQ: 977.5 TABLET, EFFERVESCENT ORAL at 07:03

## 2022-03-16 NOTE — PROGRESS NOTES
"[facility]  Adult Nutrition   Progress Note (Initial Assessment)     SUMMARY     Recommendations  Recommendation/Intervention: 1. Continue Cardiac diet. 2. Menu  to obtain meal choices daily.  Goals: 1. Patient will consume 75% or greater po meals. 2. Labs will trend to target range.  Nutrition Goal Status: new  Communication of RD Recs: reviewed with RN    Dietitian Rounds Brief  RD screen for LOS. Patient with cellulitis, elevated CRP, on antibiotics. Patient with BMI >50, 240% IBW with % po intake. Weight loss would be beneficial for patient --continue lowfat, low cholesterol high-fiber cardiac diet..    Malnutrition Assessment  No overt signs of malnutrition    Diet order:   Current Diet Order: Cardiac diet      Evaluation of Received Nutrient/Fluid Intake    % Intake of Estimated Energy Needs: 75 - 100 %  % Meal Intake: 75 - 100 %    Energy Calories Required: meeting needs  Protein Required: meeting needs  Fluid Required: meeting needs  Tolerance: tolerating    Anthropometrics  Temp: 98 °F (36.7 °C)  Height: 5' 4.96" (165 cm)  Height (inches): 64.96 in  Weight Method: Bed Scale  Weight: (!) 149 kg (328 lb 7.8 oz)  Weight (lb): (!) 328.49 lb  Ideal Body Weight (IBW), Female: 124.8 lb  % Ideal Body Weight, Female (lb): 263.21 %  BMI (Calculated): 54.7  BMI Grade: greater than 40 - morbid obesity       Estimated/Assessed Needs  Weight Used For Calorie Calculations: (!) 149 kg (328 lb 7.8 oz)  Energy Calorie Requirements (kcal): 4079-0014 kcal/day (15-18 kcal/kg)  Energy Need Method: Kcal/kg  Protein Requirements:  gm/day (1.5-2.0 gm/kg IBW)  Weight Used For Protein Calculations: 62 kg (136 lb 11 oz) (IBW)  Fluid Requirements (mL): 3725ml/day (25ml/kg)     RDA Method (mL): 2235       Reason for Assessment  Reason For Assessment: length of stay  Diagnosis: other (see comments) (cellulitis of right leg)  Relevant Medical History: renal disorder, obesity  Interdisciplinary Rounds: did " not attend    Nutrition/Diet History  Food Allergies: NKFA  Factors Affecting Nutritional Intake: None identified at this time    Nutrition Risk Screen  Nutrition Risk Screen: no indicators present     MST Score: 0  Have you recently lost weight without trying?: No  Weight loss score: 0  Have you been eating poorly because of a decreased appetite?: No  Appetite score: 0       Weight History:  Wt Readings from Last 5 Encounters:   03/12/22 (!) 149 kg (328 lb 7.8 oz)   03/10/22 127 kg (280 lb)   12/03/21 127 kg (280 lb)   03/29/20 127.9 kg (282 lb)   02/21/20 99.8 kg (220 lb)        Lab/Procedures/Meds: Pertinent Labs/Meds Reviewed    Medications:Pertinent Medications Reviewed  Scheduled Meds:   amitriptyline  25 mg Oral QHS    enoxparin  40 mg Subcutaneous Q12H    fluticasone propionate  1 spray Each Nostril Daily    nicotine  1 patch Transdermal Daily    piperacillin-tazobactam (ZOSYN) IVPB  4.5 g Intravenous Q8H    tiZANidine  4 mg Oral Nightly    traZODone  100 mg Oral QHS    vancomycin (VANCOCIN) IVPB  2,000 mg Intravenous Once     Continuous Infusions:  PRN Meds:.acetaminophen, acetaminophen, HYDROcodone-acetaminophen, magnesium oxide, magnesium oxide, melatonin, naloxone, ondansetron, potassium bicarbonate, potassium bicarbonate, potassium bicarbonate, potassium, sodium phosphates, potassium, sodium phosphates, potassium, sodium phosphates, prochlorperazine, sodium chloride 0.9%, Pharmacy to dose Vancomycin consult **AND** vancomycin - pharmacy to dose    Labs: Pertinent Labs Reviewed  Clinical Chemistry:  Recent Labs   Lab 03/10/22  0920 03/13/22  1458 03/16/22  0649   * 138 135*   K 3.8 3.8 4.7    104 100   CO2 20* 24 28    93 99   BUN 12 13 7   CREATININE 1.0 1.1 1.1   CALCIUM 8.8 8.6* 8.1*   PROT 7.9 7.2 6.4   ALBUMIN 3.7 2.9* 2.7*   BILITOT 1.2* 0.7 0.8   ALKPHOS 92 87 71   AST 30 23 19   ALT 39 30 19   ANIONGAP 9 10 7*   ESTGFRAFRICA >60.0 >60.0 >60.0   EGFRNONAA >60.0 >60.0  >60.0   MG  --  1.6  --    PHOS  --  5.3*  --    LIPASE 31  --   --     < > = values in this interval not displayed.     CBC:   Recent Labs   Lab 03/16/22  0649   WBC 8.16   RBC 3.33*   HGB 9.0*   HCT 29.1*      MCV 87   MCH 27.0   MCHC 30.9*     Cardiac Profile:  Recent Labs   Lab 03/12/22  1814      TROPONINI <0.030     Inflammatory Labs:  Recent Labs   Lab 03/12/22  1814   CRP 16.62*     Monitor and Evaluation  Food and Nutrient Intake: energy intake, food and beverage intake  Food and Nutrient Adminstration: diet order  Knowledge/Beliefs/Attitudes: food and nutrition knowledge/skill  Physical Activity and Function: nutrition-related ADLs and IADLs  Anthropometric Measurements: weight, weight change, body mass index, growth pattern indices/percentile ranks, other (specify)  Biochemical Data, Medical Tests and Procedures: electrolyte and renal panel, gastrointestinal profile, glucose/endocrine profile, inflammatory profile, lipid profile  Nutrition-Focused Physical Findings: overall appearance     Nutrition Risk  Level of Risk/Frequency of Follow-up: moderate     Nutrition Follow-Up  RD Follow-up?: Yes      Jolie Cerna RD, LDN 03/16/2022 1:21 PM

## 2022-03-16 NOTE — PROGRESS NOTES
Pharmacokinetic Assessment Follow Up: IV Vancomycin    Patient brief summary:  Roya Leone is a 41 y.o. female initiated on antimicrobial therapy with IV Vancomycin for treatment of Cellulitis    The patient's current regimen is 2000 mg IV q 16 hours      Actual Body Weight = (!) 149 kg (328 lb 7.8 oz).    Renal Function:   Estimated Creatinine Clearance: 91.3 mL/min (based on SCr of 1.2 mg/dL).      Vancomycin serum concentration assessment(s):    The trough level was drawn correctly and can be used to guide therapy at this time. The measurement is above the desired definitive target range of 10 to 15 mcg/mL. However, previous dose was administered at approx 1600 and thus only about 5 hours between dose and lab draw.     Vancomycin Regimen Plan:    Will d/c scheduled doses and draw a random level with AM labs. Will resume scheduled doses when level is within range.     Drug levels (last 3 results):  Recent Labs   Lab Result Units 03/14/22  1007 03/15/22  2049   Vancomycin-Trough ug/mL 19.7 26.4*       Pharmacy will continue to follow and monitor vancomycin.    Please contact pharmacy at extension [phone] for questions regarding this assessment.    Thank you for the consult,   William Serrano

## 2022-03-16 NOTE — PROGRESS NOTES
Waiting for significant other to get off work this fabiola and driving from [place]. Hospitalist wants pt. To receive last doses of antibiotics.

## 2022-03-16 NOTE — DISCHARGE INSTRUCTIONS
You were hospitalized for a skin infection. You responded well to IV antibiotics and did not require surgery.     Continue to take clindamycin by mouth every 6 hours for the next 5 days  Continue to take Keflex by mouth every 6 hours for the next 5 days.    You may take ibuprofen as needed for pain. Your pain and swelling will continue to improve over the next week. Keep your leg elevated while you sleep to help with the swelling.   You may take norco 5mg tablet by mouth up to twice per day as needed for pain. Only use if you pain does not respond to ibuprofen.    You will need to set up appointments with the lymphedema clinic and wound care clinics. Our outpatient referral team will help you do this.

## 2022-03-16 NOTE — PROGRESS NOTES
Pharmacokinetic Assessment Follow Up: IV Vancomycin    Vancomycin serum concentration assessment(s):    The random level was drawn correctly and can be used to guide therapy at this time. The measurement is within the desired definitive target range of 10 to 15 mcg/mL.    Vancomycin Regimen Plan:    Continue regimen to Vancomycin 2000 mg IV every once hours with next serum trough concentration measured at 0200 prior to 0300 dose on 03/17/2022.  THE NEED FOR A 0300 DOSE WILL BE DETERMINED AFTER THE RESULTS OF THE 0200 VANCOMYCIN TROUGH LEVEL IS OBTAINED.    Drug levels (last 3 results):  Recent Labs   Lab Result Units 03/14/22  1007 03/15/22  2049 03/16/22  0649   Vancomycin, Random ug/mL  --   --  12.7   Vancomycin-Trough ug/mL 19.7 26.4*  --        Pharmacy will continue to follow and monitor vancomycin.    Please contact pharmacy at extension [phone] for questions regarding this assessment.    Thank you for the consult,   Luis Knowles       Patient brief summary:  Roya Leone is a 41 y.o. female initiated on antimicrobial therapy with IV Vancomycin for treatment of skin & soft tissue infection    The patient's current regimen is VANCOMYCIN 2000 MG WITH A TROUGH LEVEL BEFORE THE NEXT DOSE IN ORDER TO CALCULATE THE NEXT DOSE NEEDED.    Drug Allergies:   Review of patient's allergies indicates:  No Known Allergies    Actual Body Weight:   149 kg    Renal Function:   Estimated Creatinine Clearance: 99.6 mL/min (based on SCr of 1.1 mg/dL).,     Dialysis Method (if applicable):  N/A    CBC (last 72 hours):  Recent Labs   Lab Result Units 03/13/22  1458 03/14/22  0444 03/15/22  1135 03/16/22  0649   WBC K/uL 6.15 6.34 7.32 8.16   Hemoglobin g/dL 9.8* 8.9* 9.1* 9.0*   Hematocrit % 31.4* 29.2* 28.4* 29.1*   Platelets K/uL 196 226 268 242   Gran % % 57.2 58.2 68.0 57.0   Lymph % % 24.1 21.9 16.0* 28.0   Mono % % 12.7 11.7 4.0 5.0   Eosinophil % % 2.9 3.0 2.0 3.0   Basophil % % 0.7 0.8 0.0 0.0   Differential  Method  Automated Automated Manual Manual       Metabolic Panel (last 72 hours):  Recent Labs   Lab Result Units 03/13/22  1458 03/14/22  0444 03/15/22  1135 03/15/22  2049 03/16/22  0649   Sodium mmol/L 138 135* 139  --  135*   Potassium mmol/L 3.8 3.5 3.3* 3.4* 4.7   Chloride mmol/L 104 104 104  --  100   CO2 mmol/L 24 25 28  --  28   Glucose mg/dL 93 105 112*  --  99   BUN mg/dL 13 11 7  --  7   Creatinine mg/dL 1.1 1.0 1.2  --  1.1   Albumin g/dL 2.9* 2.5* 2.8*  --  2.7*   Total Bilirubin mg/dL 0.7 0.4 0.5  --  0.8   Alkaline Phosphatase U/L 87 78 82  --  71   AST U/L 23 19 20  --  19   ALT U/L 30 25 23  --  19   Magnesium mg/dL 1.6  --   --   --   --    Phosphorus mg/dL 5.3*  --   --   --   --        Vancomycin Administrations:  vancomycin given in the last 96 hours                     vancomycin (VANCOCIN) 2,000 mg in dextrose 5 % 500 mL IVPB (mg) 2,000 mg New Bag 03/14/22 1401    vancomycin (VANCOCIN) 2,000 mg in dextrose 5 % 500 mL IVPB (mg) 2,000 mg New Bag 03/13/22 2351     2,000 mg New Bag  0858    vancomycin in dextrose 5 % 1 gram/250 mL IVPB 1,000 mg (mg) 1,000 mg New Bag 03/12/22 2056    vancomycin in dextrose 5 % 1 gram/250 mL IVPB 1,000 mg (mg) 1,000 mg New Bag 03/12/22 2056                    Microbiologic Results:  Microbiology Results (last 7 days)       Procedure Component Value Units Date/Time    Blood culture x two cultures. Draw prior to antibiotics. [[id number]] Collected: 03/12/22 1814    Order Status: Completed Specimen: Blood from Peripheral, Antecubital, Left Updated: 03/16/22 0232     Blood Culture, Routine No Growth to date      No Growth to date      No Growth to date      No Growth to date    Narrative:      Aerobic and anaerobic    Blood culture x two cultures. Draw prior to antibiotics. [[id number]] Collected: 03/12/22 9769    Order Status: Completed Specimen: Blood from Peripheral, Upper Arm, Right Updated: 03/16/22 0232     Blood Culture, Routine No Growth to date      No Growth to  date      No Growth to date      No Growth to date    Narrative:      Aerobic and anaerobic    MRSA Screen by PCR [[id number]] Collected: 03/15/22 1529    Order Status: Completed Specimen: Nasopharyngeal Swab from Nasal Updated: 03/15/22 1707     MRSA SCREEN BY PCR Negative

## 2022-03-16 NOTE — PROGRESS NOTES
[facility]  General Surgery  Progress Note    Subjective:     Interval History: tmax 100.2. no change in symptoms. States pain still significant. Repeat CT showed no evidence of abscess. No change in degree of skin thickening or inflammation.     Post-Op Info:  * No surgery found *          Medications:  Continuous Infusions:  Scheduled Meds:   amitriptyline  25 mg Oral QHS    enoxparin  40 mg Subcutaneous Q12H    fluticasone propionate  1 spray Each Nostril Daily    nicotine  1 patch Transdermal Daily    piperacillin-tazobactam (ZOSYN) IVPB  4.5 g Intravenous Q8H    tiZANidine  4 mg Oral Nightly    traZODone  100 mg Oral QHS    vancomycin (VANCOCIN) IVPB  2,000 mg Intravenous Q16H     PRN Meds:acetaminophen, acetaminophen, HYDROcodone-acetaminophen, magnesium oxide, magnesium oxide, melatonin, naloxone, ondansetron, potassium bicarbonate, potassium bicarbonate, potassium bicarbonate, potassium, sodium phosphates, potassium, sodium phosphates, potassium, sodium phosphates, prochlorperazine, sodium chloride 0.9%, Pharmacy to dose Vancomycin consult **AND** vancomycin - pharmacy to dose     Objective:     Vital Signs (Most Recent):  Temp: 98.7 °F (37.1 °C) (03/15/22 2014)  Pulse: 85 (03/15/22 2014)  Resp: 18 (03/15/22 2014)  BP: 115/72 (03/15/22 2014)  SpO2: 97 % (03/15/22 2014) Vital Signs (24h Range):  Temp:  [97.6 °F (36.4 °C)-100.2 °F (37.9 °C)] 98.7 °F (37.1 °C)  Pulse:  [81-95] 85  Resp:  [17-20] 18  SpO2:  [93 %-97 %] 97 %  BP: ()/(52-78) 115/72     No intake or output data in the 24 hours ending 03/15/22 2054    Physical Exam  Constitutional:       General: She is not in acute distress.  Neurological:      Mental Status: She is alert.         Significant Labs:  CBC:   Recent Labs   Lab 03/15/22  1135   WBC 7.32   RBC 3.38*   HGB 9.1*   HCT 28.4*      MCV 84   MCH 26.9*   MCHC 32.0     CMP:   Recent Labs   Lab 03/15/22  1135   *   CALCIUM 8.5*   ALBUMIN 2.8*   PROT 6.9       K 3.3*   CO2 28      BUN 7   CREATININE 1.2   ALKPHOS 82   ALT 23   AST 20   BILITOT 0.5       Significant Diagnostics:  I have reviewed all pertinent imaging results/findings within the past 24 hours.    Assessment/Plan:     Active Diagnoses:    Diagnosis Date Noted POA    PRINCIPAL PROBLEM:  Cellulitis of right lower extremity [L03.115] 01/21/2017 Yes    Mood disorder [F39] 03/15/2022 Yes    Smoker [F17.200] 03/14/2022 Yes    Morbid obesity [E66.01] 03/13/2022 Yes      Problems Resolved During this Admission:   Soft tissue infection superimposed on chronic Lymphedema     -no change in exam. Continue antibiotics. No areas of necrosis or abscess obvious on exam.   No change in CT     Alvaro Paige III, MD  General Surgery  [facility]

## 2022-03-16 NOTE — NURSING
RLE posterior calf blister beginning to drain but with only a small amount of thin, bright yellow fluid. While pt. observed,  Applied Ca alginate with AG to open area. Covered with dry gauze, then ABD pads. Wrapped with roll gauze, secured with tape.  Applied Lg. stockinette to hold In place.  She sonny. Well and reports that she understands all instructions but will not be able to perform this herself due to the inability to reach the lower ext. To wrap it. I explained to pt. That this is not a compression dressing, only a dressing to cover her leg with something to help catch the drainage.  She was also reinstructed on the importance of keeping her RLE elevated in order to facilitate circulation, decrease the swelling and therefore, decrease the pain. .   Also, recommended the use of conform rolls instead of roll gauze. She was given supplies for 3-4 days and informs me that she lives with Felix, the significant other, but that he works 3am-6pm. He arrived prior to room to take pt. Home.  He was also instructed on dressing change and instructions on discharge needs for appt. set up with wound clinic and Lymphedema clinic. Reports that he understands all instructions.

## 2022-03-16 NOTE — PLAN OF CARE
Chart and discharge orders reviewed.  contacted Murray at [facility] to inquire about outpatient PT/OT. Murray reports outpatient therapy has lymphadema therapy and PT/Ot and [place] but  unsure of [place] Medicaid/family plannining. Murray will run benefits and contact patient with an appointment. If they do not accept insurance they will also notify patient.  contacted Devin with [facility] wound care. [facility] also takes [place] Medicaid but will need to run benefits to see if it includes family planning [place] Medicaid. [facility] with an appointment or to notify if they do not accept insurance. Contacted [facility], limb wedge is not covered by insurance but they are in stock for $50.  notified Dr Kaufman that services will not be set up at time of discharge and unsure of coverage. Dr Kaufman request nursing train patient and significant other Felix on wound care in the event services are not covered Patient updated on plan of care and verbalize understanding that both agencies will contact patient with an appointment or denial of insurance benefit.  informed patient of limb wedge cost. Patient verbalized understanding. Patient discharged home with no further case management needs.         03/16/22 1635   Final Note   Assessment Type Final Discharge Note   Anticipated Discharge Disposition Home   What phone number can be called within the next 1-3 days to see how you are doing after discharge? [phone]

## 2022-03-16 NOTE — DISCHARGE SUMMARY
"[facility] Medicine  Discharge Summary      Patient Name: Roya Leone  MRN: [id number]  Patient Class: IP- Inpatient  Admission Date: 3/12/2022  Hospital Length of Stay: 2 days  Discharge Date and Time:  03/16/2022 12:08 PM  Attending Physician: Mike Kaufman MD   Discharging Provider: Mike Kaufman MD  Primary Care Provider: Primary Doctor No      HPI:   No notes on file    * No surgery found *      Hospital Course:   Patient found to have right lower extremity cellulitis due to combination of lymphedema and morbid obesity.  She was started on IV antibiotics. Patient's fever curve, erythema, and lower extremity tenderness improved. Repeat CT imaging of the right leg did not reveal abscess or phlegmon. Surgical I&D not indicated. Patient was able to ambulate independently prior to discharge. She will be discharged on PO clindamycin and PO keflex. She will make appointments to follow up with lymphedema clinic and wound care. She will need to establish with a primary care physician       Goals of Care Treatment Preferences:  Code Status: Full Code      Consults:   Consults (From admission, onward)        Status Ordering Provider     Inpatient consult to PICC Line Nurse  Once        Provider:  (Not yet assigned)    Acknowledged JORGE VANG     Inpatient consult to General Surgery  Once        Provider:  Alvaro Paige III, MD    Completed JORGE VANG     Inpatient consult to Hospitalist  Once        Provider:  FLACA Arenas    Acknowledged SHARON CURTIS     Pharmacy to dose Vancomycin consult  Once        Provider:  (Not yet assigned)   "And" Linked Group Details    Acknowledged SHARON CURTIS          No new Assessment & Plan notes have been filed under this hospital service since the last note was generated.  Service: Hospital Medicine    Final Active Diagnoses:    Diagnosis Date Noted POA    PRINCIPAL PROBLEM:  Cellulitis of right lower extremity [L03.115] " "01/21/2017 Yes    Mood disorder [F39] 03/15/2022 Yes    Chronic acquired lymphedema [I89.0]  Yes    Smoker [F17.200] 03/14/2022 Yes    Morbid obesity [E66.01] 03/13/2022 Yes      Problems Resolved During this Admission:       Discharged Condition: good    Disposition: Home or Self Care    Follow Up:   Follow-up Information     [facility] Follow up.    Specialty: Family Medicine  Why: Make an appointment as needed for care  Contact information:  [address] [facility] [address]  [phone]                       Patient Instructions:      HME - OTHER     Order Specific Question Answer Comments   Type of Equipment: limb wedge to assist with elevation of Right lower extremity    Height: 5' 4.96" (1.65 m)    Weight: 149 kg (328 lb 7.8 oz)    Does patient have medical equipment at home? none      Ambulatory referral/consult to Outpatient Case Management   Referral Priority: Routine Referral Type: Consultation   Referral Reason: Specialty Services Required   Number of Visits Requested: 1     Ambulatory referral/consult to Wound Clinic   Standing Status: Future   Referral Priority: Routine Referral Type: Consultation   Referral Reason: Specialty Services Required   Requested Specialty: Wound Care   Number of Visits Requested: 1     Ambulatory referral/consult to Physical/Occupational Therapy   Standing Status: Future   Referral Priority: Routine Referral Type: Physical Medicine   Referral Reason: Specialty Services Required   Number of Visits Requested: 1     Activity as tolerated       Significant Diagnostic Studies: Labs:   CMP   Recent Labs   Lab 03/15/22  1135 03/15/22  2049 03/16/22  0649     --  135*   K 3.3* 3.4* 4.7     --  100   CO2 28  --  28   *  --  99   BUN 7  --  7   CREATININE 1.2  --  1.1   CALCIUM 8.5*  --  8.1*   PROT 6.9  --  6.4   ALBUMIN 2.8*  --  2.7*   BILITOT 0.5  --  0.8   ALKPHOS 82  --  71   AST 20  --  19   ALT 23  --  19   ANIONGAP 7*  --  7* "   ESTGFRAFRICA >60.0  --  >60.0   EGFRNONAA 56.3*  --  >60.0    and CBC   Recent Labs   Lab 03/15/22  1135 03/16/22  0649   WBC 7.32 8.16   HGB 9.1* 9.0*   HCT 28.4* 29.1*    242     Microbiology:   Blood Culture   Lab Results   Component Value Date    LABBLOO No Growth to date 03/12/2022    LABBLOO No Growth to date 03/12/2022    LABBLOO No Growth to date 03/12/2022    LABBLOO No Growth to date 03/12/2022       Pending Diagnostic Studies:     None         Medications:  Reconciled Home Medications:      Medication List      START taking these medications    clindamycin 150 MG capsule  Commonly known as: CLEOCIN  Take 1 capsule (150 mg total) by mouth every 6 (six) hours. for 5 days        CHANGE how you take these medications    cephALEXin 500 MG capsule  Commonly known as: KEFLEX  Take 1 capsule (500 mg total) by mouth every 6 (six) hours. for 5 days  What changed: See the new instructions.     HYDROcodone-acetaminophen 5-325 mg per tablet  Commonly known as: NORCO  Take 1 tablet by mouth every 12 (twelve) hours as needed for Pain.  What changed:   · when to take this  · reasons to take this        CONTINUE taking these medications    amitriptyline 25 MG tablet  Commonly known as: ELAVIL  Take 25 mg by mouth every evening.     dicyclomine 20 mg tablet  Commonly known as: BENTYL  Take 1 tablet (20 mg total) by mouth 2 (two) times daily. for 10 days     fluticasone propionate 50 mcg/actuation nasal spray  Commonly known as: FLONASE  1 spray by Each Nostril route once daily.     LIDOcaine 5 %  Commonly known as: LIDODERM  Place 1 patch onto the skin every 12 (twelve) hours.     naproxen 500 MG tablet  Commonly known as: NAPROSYN  Take 500 mg by mouth 2 (two) times daily.     tiZANidine 4 MG tablet  Commonly known as: ZANAFLEX  Take 4 mg by mouth nightly.     traZODone 100 MG tablet  Commonly known as: DESYREL  Take 100 mg by mouth every evening.        STOP taking these medications    diclofenac 25 MG  Tbec  Commonly known as: VOLTAREN     doxycycline 100 MG Cap  Commonly known as: VIBRAMYCIN     furosemide 40 MG tablet  Commonly known as: LASIX     gabapentin 300 MG capsule  Commonly known as: NEURONTIN     lorcaserin 10 mg Tab  Commonly known as: BELVIQ     omeprazole 40 MG capsule  Commonly known as: PRILOSEC     ondansetron 4 MG Tbdl  Commonly known as: ZOFRAN-ODT     potassium chloride 10 MEQ Tbsr  Commonly known as: KLOR-CON     propranoloL 20 MG tablet  Commonly known as: INDERAL     sumatriptan 25 MG Tab  Commonly known as: IMITREX     traMADoL 50 mg tablet  Commonly known as: ULTRAM            Indwelling Lines/Drains at time of discharge:   Lines/Drains/Airways     None          Nursing ordered to remove midline catheter at time of discharge        Time spent on the discharge of patient: 43 minutes         Mike Kaufman MD  Department of Hospital Medicine  [facility]

## 2022-03-18 LAB
BACTERIA BLD CULT: NORMAL
BACTERIA BLD CULT: NORMAL

## 2022-04-11 PROBLEM — S42.255A: Status: ACTIVE | Noted: 2022-04-11

## 2022-05-18 ENCOUNTER — PATIENT OUTREACH (OUTPATIENT)
Dept: ADMINISTRATIVE | Facility: OTHER | Age: 42
End: 2022-05-18

## 2022-05-24 PROBLEM — M75.102 ROTATOR CUFF SYNDROME OF LEFT SHOULDER: Status: ACTIVE | Noted: 2022-05-24

## 2022-05-24 PROBLEM — S42.255A: Status: RESOLVED | Noted: 2022-04-11 | Resolved: 2022-05-24

## 2022-05-26 NOTE — PROGRESS NOTES
CHW - Initial Contact    This Community Health Worker completed the Social Determinant of Health questionnaire with patient via telephone today.    Pt identified barriers of most importance are: pt most important barrier is food  Referrals to community agencies completed with patient/caregiver consent outside of [facility] include: no  Referrals were put through [facility] - yes:  Support and Services: provided pt with a referral for home delivered meals due to the lack of mobility.  Other information discussed the patient needs / wants help with: pt states she is being assisted in her daily living to move around the house and to do other daily living needs.  Follow up required:   Follow-up Outreach - Due: 6/7/2022

## 2022-06-07 ENCOUNTER — PATIENT OUTREACH (OUTPATIENT)
Dept: ADMINISTRATIVE | Facility: OTHER | Age: 42
End: 2022-06-07

## 2022-06-07 NOTE — PROGRESS NOTES
CHW - Follow Up    This Community Health Worker completed a follow up visit with patient via telephone today.  Pt/Caregiver reported: pt reported that they referral got declined due to the age requirement and the [place] location that they requested her to go also declined her.  Community Health Worker provided: provided pt with another f/u date to give pt more options so where she can get food delivered.  Follow up required:   Follow-up Outreach - Due: 6/14/2022

## 2022-06-30 ENCOUNTER — PATIENT OUTREACH (OUTPATIENT)
Dept: ADMINISTRATIVE | Facility: OTHER | Age: 42
End: 2022-06-30

## 2022-06-30 NOTE — PROGRESS NOTES
CHW - Unable to Contact    Community Health Worker to close episode at this time due to three missed attempts for patient contact.

## 2022-11-29 ENCOUNTER — HOSPITAL ENCOUNTER (EMERGENCY)
Facility: HOSPITAL | Age: 42
Discharge: HOME | End: 2022-11-29
Attending: EMERGENCY MEDICINE
Payer: COMMERCIAL

## 2022-11-29 VITALS
BODY MASS INDEX: 50.02 KG/M2 | HEART RATE: 68 BPM | OXYGEN SATURATION: 97 % | SYSTOLIC BLOOD PRESSURE: 110 MMHG | HEIGHT: 64 IN | RESPIRATION RATE: 20 BRPM | DIASTOLIC BLOOD PRESSURE: 67 MMHG | TEMPERATURE: 98 F | WEIGHT: 293 LBS

## 2022-11-29 DIAGNOSIS — J11.1 INFLUENZA: ICD-10-CM

## 2022-11-29 DIAGNOSIS — R11.2 NAUSEA AND VOMITING, UNSPECIFIED VOMITING TYPE: ICD-10-CM

## 2022-11-29 DIAGNOSIS — R10.13 EPIGASTRIC PAIN: Primary | ICD-10-CM

## 2022-11-29 LAB
ALBUMIN SERPL BCP-MCNC: 3.5 G/DL (ref 3.5–5.2)
ALP SERPL-CCNC: 74 U/L (ref 55–135)
ALT SERPL W/O P-5'-P-CCNC: 24 U/L (ref 10–44)
ANION GAP SERPL CALC-SCNC: 11 MMOL/L (ref 8–16)
AST SERPL-CCNC: 24 U/L (ref 10–40)
B-HCG UR QL: NEGATIVE
BACTERIA #/AREA URNS HPF: NORMAL /HPF
BASOPHILS # BLD AUTO: 0.03 K/UL (ref 0–0.2)
BASOPHILS NFR BLD: 0.6 % (ref 0–1.9)
BILIRUB SERPL-MCNC: 0.3 MG/DL (ref 0.1–1)
BILIRUB UR QL STRIP: NEGATIVE
BUN SERPL-MCNC: 7 MG/DL (ref 6–20)
CALCIUM SERPL-MCNC: 8.5 MG/DL (ref 8.7–10.5)
CHLORIDE SERPL-SCNC: 109 MMOL/L (ref 95–110)
CLARITY UR: CLEAR
CO2 SERPL-SCNC: 17 MMOL/L (ref 23–29)
COLOR UR: YELLOW
CREAT SERPL-MCNC: 0.8 MG/DL (ref 0.5–1.4)
DIFFERENTIAL METHOD: NORMAL
EOSINOPHIL # BLD AUTO: 0.1 K/UL (ref 0–0.5)
EOSINOPHIL NFR BLD: 2.1 % (ref 0–8)
ERYTHROCYTE [DISTWIDTH] IN BLOOD BY AUTOMATED COUNT: 12.9 % (ref 11.5–14.5)
EST. GFR  (NO RACE VARIABLE): >60 ML/MIN/1.73 M^2
GLUCOSE SERPL-MCNC: 85 MG/DL (ref 70–110)
GLUCOSE UR QL STRIP: NEGATIVE
HCT VFR BLD AUTO: 39.2 % (ref 37–48.5)
HCV AB SERPL QL IA: NORMAL
HGB BLD-MCNC: 13 G/DL (ref 12–16)
HGB UR QL STRIP: NEGATIVE
HIV 1+2 AB+HIV1 P24 AG SERPL QL IA: NORMAL
IMM GRANULOCYTES # BLD AUTO: 0.02 K/UL (ref 0–0.04)
IMM GRANULOCYTES NFR BLD AUTO: 0.4 % (ref 0–0.5)
KETONES UR QL STRIP: NEGATIVE
LEUKOCYTE ESTERASE UR QL STRIP: NEGATIVE
LIPASE SERPL-CCNC: 85 U/L (ref 4–60)
LYMPHOCYTES # BLD AUTO: 1.6 K/UL (ref 1–4.8)
LYMPHOCYTES NFR BLD: 31.4 % (ref 18–48)
MCH RBC QN AUTO: 30.4 PG (ref 27–31)
MCHC RBC AUTO-ENTMCNC: 33.2 G/DL (ref 32–36)
MCV RBC AUTO: 92 FL (ref 82–98)
MICROSCOPIC COMMENT: NORMAL
MONOCYTES # BLD AUTO: 0.4 K/UL (ref 0.3–1)
MONOCYTES NFR BLD: 7.8 % (ref 4–15)
NEUTROPHILS # BLD AUTO: 3 K/UL (ref 1.8–7.7)
NEUTROPHILS NFR BLD: 57.7 % (ref 38–73)
NITRITE UR QL STRIP: POSITIVE
NRBC BLD-RTO: 0 /100 WBC
PH UR STRIP: 6 [PH] (ref 5–8)
PLATELET # BLD AUTO: 231 K/UL (ref 150–450)
PMV BLD AUTO: 9.7 FL (ref 9.2–12.9)
POTASSIUM SERPL-SCNC: 4.6 MMOL/L (ref 3.5–5.1)
PROT SERPL-MCNC: 7.6 G/DL (ref 6–8.4)
PROT UR QL STRIP: NEGATIVE
RBC # BLD AUTO: 4.28 M/UL (ref 4–5.4)
RBC #/AREA URNS HPF: 2 /HPF (ref 0–4)
SODIUM SERPL-SCNC: 137 MMOL/L (ref 136–145)
SP GR UR STRIP: 1.02 (ref 1–1.03)
SQUAMOUS #/AREA URNS HPF: 6 /HPF
URN SPEC COLLECT METH UR: ABNORMAL
UROBILINOGEN UR STRIP-ACNC: NEGATIVE EU/DL
WBC # BLD AUTO: 5.12 K/UL (ref 3.9–12.7)
WBC #/AREA URNS HPF: 1 /HPF (ref 0–5)

## 2022-11-29 PROCEDURE — 96375 TX/PRO/DX INJ NEW DRUG ADDON: CPT

## 2022-11-29 PROCEDURE — 36415 COLL VENOUS BLD VENIPUNCTURE: CPT | Performed by: EMERGENCY MEDICINE

## 2022-11-29 PROCEDURE — 83690 ASSAY OF LIPASE: CPT | Performed by: NURSE PRACTITIONER

## 2022-11-29 PROCEDURE — 99285 EMERGENCY DEPT VISIT HI MDM: CPT | Mod: 25

## 2022-11-29 PROCEDURE — 80053 COMPREHEN METABOLIC PANEL: CPT | Performed by: NURSE PRACTITIONER

## 2022-11-29 PROCEDURE — 96374 THER/PROPH/DIAG INJ IV PUSH: CPT

## 2022-11-29 PROCEDURE — 85025 COMPLETE CBC W/AUTO DIFF WBC: CPT | Performed by: NURSE PRACTITIONER

## 2022-11-29 PROCEDURE — 63600175 PHARM REV CODE 636 W HCPCS: Performed by: NURSE PRACTITIONER

## 2022-11-29 PROCEDURE — 25000003 PHARM REV CODE 250: Performed by: NURSE PRACTITIONER

## 2022-11-29 PROCEDURE — 86803 HEPATITIS C AB TEST: CPT | Performed by: EMERGENCY MEDICINE

## 2022-11-29 PROCEDURE — 87389 HIV-1 AG W/HIV-1&-2 AB AG IA: CPT | Performed by: EMERGENCY MEDICINE

## 2022-11-29 PROCEDURE — 87086 URINE CULTURE/COLONY COUNT: CPT | Performed by: PHYSICIAN ASSISTANT

## 2022-11-29 PROCEDURE — 96361 HYDRATE IV INFUSION ADD-ON: CPT

## 2022-11-29 PROCEDURE — 81025 URINE PREGNANCY TEST: CPT | Performed by: PHYSICIAN ASSISTANT

## 2022-11-29 PROCEDURE — 25500020 PHARM REV CODE 255

## 2022-11-29 PROCEDURE — 25000003 PHARM REV CODE 250: Performed by: PHYSICIAN ASSISTANT

## 2022-11-29 PROCEDURE — 81000 URINALYSIS NONAUTO W/SCOPE: CPT | Performed by: NURSE PRACTITIONER

## 2022-11-29 PROCEDURE — 63600175 PHARM REV CODE 636 W HCPCS: Performed by: PHYSICIAN ASSISTANT

## 2022-11-29 RX ORDER — FAMOTIDINE 20 MG/1
20 TABLET, FILM COATED ORAL 2 TIMES DAILY
Qty: 60 TABLET | Refills: 0 | Status: SHIPPED | OUTPATIENT
Start: 2022-11-29 | End: 2022-12-29

## 2022-11-29 RX ORDER — LIDOCAINE HYDROCHLORIDE 20 MG/ML
15 SOLUTION OROPHARYNGEAL ONCE
Status: COMPLETED | OUTPATIENT
Start: 2022-11-29 | End: 2022-11-29

## 2022-11-29 RX ORDER — FAMOTIDINE 10 MG/ML
20 INJECTION INTRAVENOUS
Status: COMPLETED | OUTPATIENT
Start: 2022-11-29 | End: 2022-11-29

## 2022-11-29 RX ORDER — MORPHINE SULFATE 2 MG/ML
6 INJECTION, SOLUTION INTRAMUSCULAR; INTRAVENOUS
Status: COMPLETED | OUTPATIENT
Start: 2022-11-29 | End: 2022-11-29

## 2022-11-29 RX ORDER — MAG HYDROX/ALUMINUM HYD/SIMETH 200-200-20
30 SUSPENSION, ORAL (FINAL DOSE FORM) ORAL ONCE
Status: COMPLETED | OUTPATIENT
Start: 2022-11-29 | End: 2022-11-29

## 2022-11-29 RX ORDER — ONDANSETRON 8 MG/1
8 TABLET, ORALLY DISINTEGRATING ORAL 3 TIMES DAILY PRN
Qty: 20 TABLET | Refills: 0 | Status: SHIPPED | OUTPATIENT
Start: 2022-11-29

## 2022-11-29 RX ORDER — ONDANSETRON 2 MG/ML
4 INJECTION INTRAMUSCULAR; INTRAVENOUS
Status: COMPLETED | OUTPATIENT
Start: 2022-11-29 | End: 2022-11-29

## 2022-11-29 RX ADMIN — MORPHINE SULFATE 6 MG: 2 INJECTION, SOLUTION INTRAMUSCULAR; INTRAVENOUS at 01:11

## 2022-11-29 RX ADMIN — ONDANSETRON 4 MG: 2 INJECTION INTRAMUSCULAR; INTRAVENOUS at 11:11

## 2022-11-29 RX ADMIN — LIDOCAINE HYDROCHLORIDE 15 ML: 20 SOLUTION ORAL; TOPICAL at 12:11

## 2022-11-29 RX ADMIN — SODIUM CHLORIDE 1000 ML: 9 INJECTION, SOLUTION INTRAVENOUS at 11:11

## 2022-11-29 RX ADMIN — IOHEXOL 100 ML: 350 INJECTION, SOLUTION INTRAVENOUS at 02:11

## 2022-11-29 RX ADMIN — ALUMINUM HYDROXIDE, MAGNESIUM HYDROXIDE, AND SIMETHICONE 30 ML: 200; 200; 20 SUSPENSION ORAL at 12:11

## 2022-11-29 RX ADMIN — FAMOTIDINE 20 MG: 10 INJECTION, SOLUTION INTRAVENOUS at 12:11

## 2022-11-29 NOTE — DISCHARGE INSTRUCTIONS
On CT scan, there was evidence for a nodule on your left adrenal gland.  This is likely an incidental finding and there is no need for further evaluation of it in the emergency setting.  You can discuss the finding with your Primary Care Provider.

## 2022-11-29 NOTE — FIRST PROVIDER EVALUATION
" Emergency Department TeleTriage Encounter Note      CHIEF COMPLAINT    Chief Complaint   Patient presents with    Abdominal Pain     States that she is having increased abdominal pain for the past week was seen in [place], and the pain comes and goes and feels "like it getting tighter on her" nausea vomiting        VITAL SIGNS   Initial Vitals [11/29/22 1020]   BP Pulse Resp Temp SpO2   117/64 76 20 98.3 °F (36.8 °C) 97 %      MAP       --            ALLERGIES    Review of patient's allergies indicates:  No Known Allergies    PROVIDER TRIAGE NOTE  Left upper abd pain does move around.  No meds being taken for this.  Onset 3-4d ago.  Reports vomiting, diarrhea, headache.  Taking tamiflu, zofran, migraine meds.        ORDERS  Labs Reviewed   HIV 1 / 2 ANTIBODY   HEPATITIS C ANTIBODY       ED Orders (720h ago, onward)      Start Ordered     Status Ordering Provider    11/29/22 1030 11/29/22 1028  sodium chloride 0.9% bolus 1,000 mL  ED 1 Time         Ordered CHAMPAGNE, KRISTEN A.    11/29/22 1030 11/29/22 1028  ondansetron injection 4 mg  ED 1 Time         Ordered CHAMPAGNE, KRISTEN A.    11/29/22 1029 11/29/22 1028  Complete Blood Count (CBC)  STAT         Ordered CHAMPAGNE, KRISTEN A.    11/29/22 1029 11/29/22 1028  Comprehensive Metabolic Panel (CMP)  STAT         Ordered CHAMPAGNE, KRISTEN A.    11/29/22 1029 11/29/22 1028  Lipase  STAT         Ordered CHAMPAGNE, KRISTEN A.    11/29/22 1029 11/29/22 1028  Urinalysis, Reflex to Urine Culture Urine, Clean Catch  STAT         Ordered CHAMPAGNE, KRISTEN A.    11/29/22 1029 11/29/22 1028  Insert Saline lock IV  Once         Ordered CHAMPAGNE, KRISTEN A.    11/29/22 1029 11/29/22 1028  POCT urine pregnancy  Once         Ordered CHAMPAGNE, KRISTEN A.    11/29/22 1021 11/29/22 1020  HIV 1/2 Ag/Ab (4th Gen)  STAT         Pending Collection ROHIT CASTRO    11/29/22 1021 11/29/22 1020  Hepatitis C Antibody  STAT         Pending Collection ROHIT CASTRO              Virtual Visit " Note: The provider triage portion of this emergency department evaluation and documentation was performed via [employer], a HIPAA-compliant telemedicine application, in concert with a tele-presenter in the room. A face to face patient evaluation with one of my colleagues will occur once the patient is placed in an emergency department room.      DISCLAIMER: This note was prepared with [employer] voice recognition transcription software. Garbled syntax, mangled pronouns, and other bizarre constructions may be attributed to that software system.

## 2022-11-29 NOTE — ED PROVIDER NOTES
"Encounter Date: 2022    SCRIBE #1 NOTE: I, Will Jones, am scribing for, and in the presence of,  Helena Darby PA-C.     History     Chief Complaint   Patient presents with    Abdominal Pain     States that she is having increased abdominal pain for the past week was seen in [place], and the pain comes and goes and feels "like it getting tighter on her" nausea vomiting      Time seen by provider: 11:36 AM on 2022    Roya Leone is a 42 y.o. female who presents to the ED with abdominal pain. The patient reports experiencing abdominal pain PTA. She describes it as a tightness in her abdomen. The patient was in the ER a few days ago and was diagnosed with the flu. She notes that she still experiences some of the flu-like symptoms of congestion, cough, nausea, vomiting, and diarrhea. She also mentions breaking a fever in which she woke up this morning in a sweat. The patient reports experiencing abdominal pain in the past and mentions a history of gastritis in the past, but she states that her current pain feels different. The patient does not have her gallbladder but still has her appendix. She is on breathing treatment for her asthma which she states has been flaring up when she got the flu. The patient notes that she has not taken ibuprofen since her fever broke 3 days ago. The patient denies dysuria or any other symptoms at this time. PMHx of morbid obesity, COPD, DVT, and renal disorder. PSHx of cholecystectomy and nephrectomy.    The history is provided by the patient.   Review of patient's allergies indicates:  No Known Allergies  Past Medical History:   Diagnosis Date    Anxiety     COPD (chronic obstructive pulmonary disease)     Depression     DVT (deep venous thrombosis)     Migraine headache     Morbid obesity     Obese body habitus     Renal disorder     kidney stone    Renal disorder      Past Surgical History:   Procedure Laterality Date     SECTION, LOW TRANSVERSE      x2    " CHOLECYSTECTOMY      CHOLECYSTECTOMY  2012    FRACTURE SURGERY  1984    right lower leg reconstruction surgery s/p trauma injury    NEPHRECTOMY      RT    TIBIA FRACTURE SURGERY Right     TUBAL LIGATION       Family History   Problem Relation Age of Onset    Ovarian cancer Paternal Grandmother     Colon cancer Maternal Grandmother     Ovarian cancer Maternal Grandmother     Ovarian cancer Mother     Breast cancer Neg Hx     Heart disease Mother      Social History     Tobacco Use    Smoking status: Never    Smokeless tobacco: Never   Substance Use Topics    Alcohol use: Never    Drug use: Never     Review of Systems   Constitutional:  Positive for diaphoresis. Negative for chills and fever.   HENT:  Positive for congestion. Negative for facial swelling and trouble swallowing.    Respiratory:  Positive for cough. Negative for chest tightness, shortness of breath and wheezing.    Cardiovascular:  Negative for chest pain and palpitations.   Gastrointestinal:  Positive for abdominal pain, diarrhea, nausea and vomiting.   Genitourinary:  Negative for dysuria and hematuria.   Musculoskeletal:  Negative for arthralgias, back pain, joint swelling, myalgias, neck pain and neck stiffness.   Skin:  Negative for color change, pallor, rash and wound.   Neurological:  Negative for dizziness, syncope, weakness, light-headedness, numbness and headaches.   Hematological:  Does not bruise/bleed easily.   Psychiatric/Behavioral:  The patient is not nervous/anxious.    All other systems reviewed and are negative.    Physical Exam     Initial Vitals [11/29/22 1020]   BP Pulse Resp Temp SpO2   117/64 76 20 98.3 °F (36.8 °C) 97 %      MAP       --         Physical Exam    Nursing note and vitals reviewed.  Constitutional: She appears well-developed and well-nourished. She is not diaphoretic. No distress.   HENT:   Head: Normocephalic and atraumatic.   Nasal congestion and rhinorrhea noted.  Mild erythema noted to posterior oropharynx  without edema or exudate.      Eyes: Conjunctivae are normal.   Neck: Neck supple.   Normal range of motion.  Cardiovascular:  Normal rate, regular rhythm, normal heart sounds and intact distal pulses.     Exam reveals no gallop and no friction rub.       No murmur heard.  Pulmonary/Chest: No respiratory distress. She has wheezes. She has no rhonchi. She has no rales.   Mild expiratory wheezing bilaterally.   Abdominal: Abdomen is soft. She exhibits no distension and no mass. There is abdominal tenderness.   Mild tenderness to palpation noted to epigastric region.   Musculoskeletal:         General: No tenderness or edema. Normal range of motion.      Cervical back: Normal range of motion and neck supple.     Neurological: She is alert and oriented to person, place, and time. She has normal strength. No sensory deficit.   Skin: Skin is warm and dry. No rash and no abscess noted. No erythema.   Psychiatric: She has a normal mood and affect.       ED Course   Procedures  Labs Reviewed   COMPREHENSIVE METABOLIC PANEL - Abnormal; Notable for the following components:       Result Value    CO2 17 (*)     Calcium 8.5 (*)     All other components within normal limits    Narrative:     Release to patient->Immediate   LIPASE - Abnormal; Notable for the following components:    Lipase 85 (*)     All other components within normal limits    Narrative:     Release to patient->Immediate   URINALYSIS, REFLEX TO URINE CULTURE - Abnormal; Notable for the following components:    Nitrite, UA Positive (*)     All other components within normal limits    Narrative:     Specimen Source->Urine   CULTURE, URINE   HIV 1 / 2 ANTIBODY    Narrative:     Release to patient->Immediate   HEPATITIS C ANTIBODY    Narrative:     Release to patient->Immediate   CBC W/ AUTO DIFFERENTIAL    Narrative:     Release to patient->Immediate   PREGNANCY TEST, URINE RAPID    Narrative:     Specimen Source->Urine   URINALYSIS MICROSCOPIC    Narrative:      Specimen Source->Urine          Imaging Results              CT Abdomen Pelvis With Contrast (Final result)  Result time 11/29/22 14:23:01      Final result by Dash Gil MD (11/29/22 14:23:01)                   Impression:      Indeterminate left adrenal nodule.  This could be further characterized with adrenal mass protocol CT in an elective setting if warranted.    Hepatomegaly.      Electronically signed by: Dash Gil  Date:    11/29/2022  Time:    14:23               Narrative:    EXAMINATION:  CT ABDOMEN PELVIS WITH CONTRAST    CLINICAL HISTORY:  Epigastric pain;    TECHNIQUE:  Low dose axial images, sagittal and coronal reformations were obtained from the lung bases to the pubic symphysis following the IV administration of 100 mL of Omnipaque 350 .  Oral contrast was not given.    COMPARISON:  03/10/2022    FINDINGS:  Lung bases clear.  Normal size heart.  Cholecystectomy.    Spleen size upper limit normal range.  Liver 21 cm in length.  2.3 cm left adrenal nodule Hounsfield unit 53.  There are a few areas of focal cortical thinning along the right kidney raising the possibility of remote injury such as infarct or infection.  Remaining solid abdominal organs are unremarkable.    There is no enteric contrast which limits bowel assessment.  Under distension likely accounts for proximal gastric wall thickening.  No dilated bowel loops.  Normal appendix.    No focal abdominal fluid collection.  Unremarkable uterus and urinary bladder.    No acute osseous abnormality.                                       X-Ray Chest 1 View (Final result)  Result time 11/29/22 13:18:29      Final result by Cathie Gayle MD (11/29/22 13:18:29)                   Impression:      No acute cardiopulmonary disease      Electronically signed by: Cathie Gayle MD  Date:    11/29/2022  Time:    13:18               Narrative:    EXAMINATION:  XR CHEST 1 VIEW    CLINICAL HISTORY:  Influenza due to unidentified influenza  virus with other respiratory manifestations    TECHNIQUE:  Single frontal view of the chest was performed.    COMPARISON:  03/12/2022    FINDINGS:  Cardiomediastinal silhouette appears stable.  Lungs clear of infiltrate.  No pleural effusion.                                       Medications   sodium chloride 0.9% bolus 1,000 mL (0 mLs Intravenous Stopped 11/29/22 1254)   ondansetron injection 4 mg (4 mg Intravenous Given 11/29/22 1139)   famotidine (PF) injection 20 mg (20 mg Intravenous Given 11/29/22 1236)   aluminum-magnesium hydroxide-simethicone 200-200-20 mg/5 mL suspension 30 mL (30 mLs Oral Given 11/29/22 1237)     And   LIDOcaine HCl 2% oral solution 15 mL (15 mLs Oral Given 11/29/22 1237)   morphine injection 6 mg (6 mg Intravenous Given 11/29/22 1345)   iohexoL (OMNIPAQUE 350) 350 mg iodine/mL injection (100 mLs Intravenous Given 11/29/22 1405)     Medical Decision Making:   History:   Old Medical Records: I decided to obtain old medical records.  Differential Diagnosis:   Pancreatitis  Gastritis  Acute appendicitis  Viral syndrome  Clinical Tests:   Lab Tests: Ordered and Reviewed  Radiological Study: Ordered and Reviewed  ED Management:  Chest x-ray shows no evidence for acute underlying pulmonary abnormality, such as pneumonia.  She has a history of asthma and feels as if it is flaring up mildly, secondary to her recently diagnosed influenza.  She is not hypoxic and is in no respiratory distress.  Labs stable with non elevated wbc's.  Normal LFTs.  Minimally elevated lipase at 85.  CT abdomen and pelvis shows some evidence for proximal gastric wall thickening, which could explain her epigastric pain.  There is also an incidental finding of a left adrenal nodule.  No other acute abnormalities noted.  She is feeling better after medications given here in the emergency department.  She will be discharged home to follow up with her primary care provider for re-evaluation as needed.  She is sent home with  a prescription for Pepcid and Zofran.  Urine culture is pending.  Antibiotics will be prescribed if needed. She voices understanding and is agreeable to the plan.  She is given specific return precautions.           Scribe Attestation:   Scribe #1: I performed the above scribed service and the documentation accurately describes the services I performed. I attest to the accuracy of the note.    Attending Attestation:     Physician Attestation Statement for NP/PA:   I have conducted a face to face encounter with this patient in addition to the NP/PA, due to Medical Complexity    Other NP/PA Attestation Additions:      Medical Decision Making: Roya Leone is a 42 y.o. female presenting with abdominal pain in the setting of known viral syndrome.  Extensive workup performed here without sign of acute, life-threatening process.  I doubt obstruction, perforated viscus, cholecystitis, abscess, appendicitis.  Incidental adrenal nodule noted to patient.  I doubt pancreatitis.  On my exam there is no flank tenderness.  Patient has minimal epigastric tenderness with no voluntary or involuntary guarding.  No distention, masses, palpable hernias.  Positive nitrate absent other positive urinary markers noted.  Patient has no urinary complaints.  I have very low suspicion for UTI.  Detailed return precautions reviewed with outpatient follow-up recommended.  Follow-up with PCP.                      I, Helena Darby PA-C, personally performed the services described in this documentation. All medical record entries made by the scribe were at my direction and in my presence.  I have reviewed the chart and agree that the record reflects my personal performance and is accurate and complete. Helena Darby PA-C.  9:46 PM 11/29/2022      Clinical Impression:   Final diagnoses:  [J11.1] Influenza  [R10.13] Epigastric pain (Primary)  [R11.2] Nausea and vomiting, unspecified vomiting type      ED Disposition Condition     Discharge Stable          ED Prescriptions       Medication Sig Dispense Start Date End Date Auth. Provider    ondansetron (ZOFRAN-ODT) 8 MG TbDL Take 1 tablet (8 mg total) by mouth 3 (three) times daily as needed (nausea). 20 tablet 11/29/2022 -- Helena Darby PA-C    famotidine (PEPCID) 20 MG tablet Take 1 tablet (20 mg total) by mouth 2 (two) times daily. 60 tablet 11/29/2022 12/29/2022 Helena Darby PA-C          Follow-up Information       Follow up With Specialties Details Why Contact Info    [facility] Emergency Dept Emergency Medicine  As needed, If symptoms worsen [address] [place] [id number] [phone]    [facility]  Schedule an appointment as soon as possible for a visit in 1 week for primary care evaluation [address] [place] [id number]  [phone]               Helena Darby PA-C  11/29/22 [id number]

## 2022-12-01 LAB
BACTERIA UR CULT: NORMAL
BACTERIA UR CULT: NORMAL

## 2024-04-29 ENCOUNTER — HOSPITAL ENCOUNTER (EMERGENCY)
Facility: HOSPITAL | Age: 44
Discharge: HOME | End: 2024-04-29
Attending: EMERGENCY MEDICINE
Payer: COMMERCIAL

## 2024-04-29 VITALS
HEIGHT: 60 IN | TEMPERATURE: 98 F | WEIGHT: 293 LBS | BODY MASS INDEX: 57.52 KG/M2 | HEART RATE: 82 BPM | RESPIRATION RATE: 20 BRPM | DIASTOLIC BLOOD PRESSURE: 74 MMHG | OXYGEN SATURATION: 100 % | SYSTOLIC BLOOD PRESSURE: 150 MMHG

## 2024-04-29 DIAGNOSIS — L03.115 CELLULITIS OF RIGHT LEG: Primary | ICD-10-CM

## 2024-04-29 DIAGNOSIS — M79.661 RIGHT CALF PAIN: ICD-10-CM

## 2024-04-29 LAB
ALBUMIN SERPL BCP-MCNC: 3.9 G/DL (ref 3.5–5.2)
ALP SERPL-CCNC: 76 U/L (ref 55–135)
ALT SERPL W/O P-5'-P-CCNC: 21 U/L (ref 10–44)
ANION GAP SERPL CALC-SCNC: 13 MMOL/L (ref 8–16)
AST SERPL-CCNC: 17 U/L (ref 10–40)
BASOPHILS # BLD AUTO: 0.06 K/UL (ref 0–0.2)
BASOPHILS NFR BLD: 0.7 % (ref 0–1.9)
BILIRUB SERPL-MCNC: 0.5 MG/DL (ref 0.1–1)
BUN SERPL-MCNC: 9 MG/DL (ref 6–20)
CALCIUM SERPL-MCNC: 9 MG/DL (ref 8.7–10.5)
CHLORIDE SERPL-SCNC: 105 MMOL/L (ref 95–110)
CO2 SERPL-SCNC: 21 MMOL/L (ref 23–29)
CREAT SERPL-MCNC: 0.9 MG/DL (ref 0.5–1.4)
DIFFERENTIAL METHOD BLD: ABNORMAL
EOSINOPHIL # BLD AUTO: 0.3 K/UL (ref 0–0.5)
EOSINOPHIL NFR BLD: 3.5 % (ref 0–8)
ERYTHROCYTE [DISTWIDTH] IN BLOOD BY AUTOMATED COUNT: 11.9 % (ref 11.5–14.5)
EST. GFR  (NO RACE VARIABLE): >60 ML/MIN/1.73 M^2
GLUCOSE SERPL-MCNC: 86 MG/DL (ref 70–110)
HCT VFR BLD AUTO: 40.2 % (ref 37–48.5)
HGB BLD-MCNC: 13.4 G/DL (ref 12–16)
IMM GRANULOCYTES # BLD AUTO: 0.06 K/UL (ref 0–0.04)
IMM GRANULOCYTES NFR BLD AUTO: 0.7 % (ref 0–0.5)
LYMPHOCYTES # BLD AUTO: 2.2 K/UL (ref 1–4.8)
LYMPHOCYTES NFR BLD: 27.9 % (ref 18–48)
MCH RBC QN AUTO: 31.2 PG (ref 27–31)
MCHC RBC AUTO-ENTMCNC: 33.3 G/DL (ref 32–36)
MCV RBC AUTO: 94 FL (ref 82–98)
MONOCYTES # BLD AUTO: 0.5 K/UL (ref 0.3–1)
MONOCYTES NFR BLD: 6.5 % (ref 4–15)
NEUTROPHILS # BLD AUTO: 4.9 K/UL (ref 1.8–7.7)
NEUTROPHILS NFR BLD: 60.7 % (ref 38–73)
NRBC BLD-RTO: 0 /100 WBC
PLATELET # BLD AUTO: 166 K/UL (ref 150–450)
PLATELET BLD QL SMEAR: ABNORMAL
PMV BLD AUTO: 11.1 FL (ref 9.2–12.9)
POTASSIUM SERPL-SCNC: 4.3 MMOL/L (ref 3.5–5.1)
PROT SERPL-MCNC: 7.4 G/DL (ref 6–8.4)
RBC # BLD AUTO: 4.29 M/UL (ref 4–5.4)
SODIUM SERPL-SCNC: 139 MMOL/L (ref 136–145)
WBC # BLD AUTO: 8.02 K/UL (ref 3.9–12.7)

## 2024-04-29 PROCEDURE — 63600175 PHARM REV CODE 636 W HCPCS: Performed by: PHYSICIAN ASSISTANT

## 2024-04-29 PROCEDURE — 96374 THER/PROPH/DIAG INJ IV PUSH: CPT

## 2024-04-29 PROCEDURE — 36415 COLL VENOUS BLD VENIPUNCTURE: CPT | Performed by: PHYSICIAN ASSISTANT

## 2024-04-29 PROCEDURE — 85025 COMPLETE CBC W/AUTO DIFF WBC: CPT | Performed by: PHYSICIAN ASSISTANT

## 2024-04-29 PROCEDURE — 25000003 PHARM REV CODE 250: Performed by: PHYSICIAN ASSISTANT

## 2024-04-29 PROCEDURE — 80053 COMPREHEN METABOLIC PANEL: CPT | Performed by: PHYSICIAN ASSISTANT

## 2024-04-29 PROCEDURE — 99284 EMERGENCY DEPT VISIT MOD MDM: CPT | Mod: 25

## 2024-04-29 RX ORDER — ONDANSETRON 4 MG/1
4 TABLET, ORALLY DISINTEGRATING ORAL
Status: COMPLETED | OUTPATIENT
Start: 2024-04-29 | End: 2024-04-29

## 2024-04-29 RX ORDER — SULFAMETHOXAZOLE AND TRIMETHOPRIM 800; 160 MG/1; MG/1
1 TABLET ORAL 2 TIMES DAILY
Qty: 14 TABLET | Refills: 0 | Status: SHIPPED | OUTPATIENT
Start: 2024-04-29 | End: 2024-05-06

## 2024-04-29 RX ORDER — KETOROLAC TROMETHAMINE 30 MG/ML
15 INJECTION, SOLUTION INTRAMUSCULAR; INTRAVENOUS
Status: COMPLETED | OUTPATIENT
Start: 2024-04-29 | End: 2024-04-29

## 2024-04-29 RX ORDER — SULFAMETHOXAZOLE AND TRIMETHOPRIM 800; 160 MG/1; MG/1
1 TABLET ORAL
Status: COMPLETED | OUTPATIENT
Start: 2024-04-29 | End: 2024-04-29

## 2024-04-29 RX ORDER — CEPHALEXIN 250 MG/1
500 CAPSULE ORAL
Status: COMPLETED | OUTPATIENT
Start: 2024-04-29 | End: 2024-04-29

## 2024-04-29 RX ADMIN — KETOROLAC TROMETHAMINE 15 MG: 30 INJECTION, SOLUTION INTRAMUSCULAR at 06:04

## 2024-04-29 RX ADMIN — SULFAMETHOXAZOLE AND TRIMETHOPRIM 1 TABLET: 800; 160 TABLET ORAL at 08:04

## 2024-04-29 RX ADMIN — CEPHALEXIN 500 MG: 250 CAPSULE ORAL at 08:04

## 2024-04-29 RX ADMIN — ONDANSETRON 4 MG: 4 TABLET, ORALLY DISINTEGRATING ORAL at 08:04

## 2024-04-29 NOTE — FIRST PROVIDER EVALUATION
Emergency Department TeleTriage Encounter Note      CHIEF COMPLAINT    Chief Complaint   Patient presents with    Leg Swelling     Rt. Leg started yesterday        VITAL SIGNS   Initial Vitals [04/29/24 1611]   BP Pulse Resp Temp SpO2   (!) 153/77 84 20 97.9 °F (36.6 °C) 100 %      MAP       --            ALLERGIES    Review of patient's allergies indicates:  No Known Allergies    PROVIDER TRIAGE NOTE  Patient presents with right lower leg pain and swelling. History of DVT and lymphedema. She was seen at another ED today and they ruled out DVT. CBC was wnl and CMP unremarkable. She was prescribed Keflex, but has not picked up rx yet. She thinks she needs to be admitted.       ORDERS  Labs Reviewed - No data to display    ED Orders (720h ago, onward)      None              Virtual Visit Note: The provider triage portion of this emergency department evaluation and documentation was performed via [employer], a HIPAA-compliant telemedicine application, in concert with a tele-presenter in the room. A face to face patient evaluation with one of my colleagues will occur once the patient is placed in an emergency department room.      DISCLAIMER: This note was prepared with [employer] voice recognition transcription software. Garbled syntax, mangled pronouns, and other bizarre constructions may be attributed to that software system.

## 2024-04-29 NOTE — Clinical Note
"Roya Castillohector Leone was seen and treated in our emergency department on 4/29/2024.  She may return to work on 05/02/2024.       If you have any questions or concerns, please don't hesitate to call.      Helena Darby PA-C"

## 2024-04-30 NOTE — ED PROVIDER NOTES
Encounter Date: 2024       History     Chief Complaint   Patient presents with    Leg Swelling     Rt. Leg started yesterday      Roya Leone is a 43 y.o. female presenting for evaluation of pain and swelling to her right-sided lower leg and calf.  Patient states the symptoms began yesterday and she was evaluated earlier today at [facility].  There, she states they did an ultrasound, which was negative for DVT.  They diagnosed her with cellulitis and started her on Keflex.  She decided to come here for further evaluation.  She has had no fever, no chills.  She does have a previous history of multiple surgeries on the right lower leg as a child.  Patient states that the leg garcía swell frequently.  She has a past medical history of Anxiety, COPD (chronic obstructive pulmonary disease), Depression, DVT (deep venous thrombosis), Migraine headache, Morbid obesity, Obese body habitus, Renal disorder, and Renal disorder.      The history is provided by the patient.     Review of patient's allergies indicates:  No Known Allergies  Past Medical History:   Diagnosis Date    Anxiety     COPD (chronic obstructive pulmonary disease)     Depression     DVT (deep venous thrombosis)     Migraine headache     Morbid obesity     Obese body habitus     Renal disorder     kidney stone    Renal disorder      Past Surgical History:   Procedure Laterality Date     SECTION, LOW TRANSVERSE      x2    CHOLECYSTECTOMY      CHOLECYSTECTOMY  2012    FRACTURE SURGERY  1984    right lower leg reconstruction surgery s/p trauma injury    NEPHRECTOMY      RT    TIBIA FRACTURE SURGERY Right     TUBAL LIGATION       Family History   Problem Relation Name Age of Onset    Ovarian cancer Paternal Grandmother      Colon cancer Maternal Grandmother      Ovarian cancer Maternal Grandmother      Ovarian cancer Mother      Breast cancer Neg Hx      Heart disease Mother       Social History     Tobacco Use    Smoking status: Never     Smokeless tobacco: Never   Substance Use Topics    Alcohol use: Never    Drug use: Never     Review of Systems   Constitutional:  Negative for chills and fever.   Musculoskeletal:  Positive for arthralgias, joint swelling and myalgias. Negative for back pain, neck pain and neck stiffness.   Skin:  Negative for color change, pallor, rash and wound.   Neurological:  Negative for weakness and numbness.   Hematological:  Does not bruise/bleed easily.       Physical Exam     Initial Vitals [04/29/24 1611]   BP Pulse Resp Temp SpO2   (!) 153/77 84 20 97.9 °F (36.6 °C) 100 %      MAP       --         Physical Exam    Nursing note and vitals reviewed.  Constitutional: She is not diaphoretic. No distress.   HENT:   Head: Normocephalic and atraumatic.   Cardiovascular:  Intact distal pulses.           Musculoskeletal:         General: Tenderness present. Normal range of motion.      Comments: Moderately sized area of mild erythema and swelling noted to right posterior calf.  No induration.  No other rash, pustules or vesicles noted.     Neurological: She is alert and oriented to person, place, and time. She has normal strength. No sensory deficit.   Skin: Skin is warm and dry. No rash and no abscess noted. There is erythema.   Psychiatric: She has a normal mood and affect.         ED Course   Procedures  Labs Reviewed   CBC W/ AUTO DIFFERENTIAL - Abnormal; Notable for the following components:       Result Value    MCH 31.2 (*)     Immature Granulocytes 0.7 (*)     Immature Grans (Abs) 0.06 (*)     All other components within normal limits    Narrative:     Collection has been rescheduled by [email or web address] at 04/29/2024 18:07 Reason:   Patient unavailable   COMPREHENSIVE METABOLIC PANEL - Abnormal; Notable for the following components:    CO2 21 (*)     All other components within normal limits    Narrative:     Collection has been rescheduled by [facility] at 04/29/2024 18:07 Reason:   Patient unavailable          Imaging Results               X-Ray Tibia Fibula 2 View Right (Final result)  Result time 04/29/24 18:41:22      Final result by Sai Murillo IV, MD (04/29/24 18:41:22)                   Impression:      Evidence of prior healed tibial fracture with slight anterior bowing of the tibia which may slightly alter biomechanics      Electronically signed by: Sai Murillo MD  Date:    04/29/2024  Time:    18:41               Narrative:    EXAMINATION:  XR TIBIA FIBULA 2 VIEW RIGHT    CLINICAL HISTORY:  Pain in right lower leg    TECHNIQUE:  AP and lateral views of the right tibia and fibula were performed.    COMPARISON:  None    FINDINGS:  Evidence of a prior healed tibial fracture is noted.  Mild medial compartment narrowing is noted at the knee.  Achilles and plantar calcaneal spurring is noted.  Superior and inferior patellar articular surface spurring is noted.  A small suprapatellar joint effusion on right is noted.  No acute fracture or dislocation is convincingly noted                                       Medications   ketorolac injection 15 mg (15 mg Intravenous Given 4/29/24 1845)   cephALEXin capsule 500 mg (500 mg Oral Given 4/29/24 2034)   sulfamethoxazole-trimethoprim 800-160mg per tablet 1 tablet (1 tablet Oral Given 4/29/24 2034)   ondansetron disintegrating tablet 4 mg (4 mg Oral Given 4/29/24 2034)     Medical Decision Making  Differential diagnosis:  DVT  Cellulitis  Fracture  Lymphedema    Pt emergently evaluated here in the ED.    Patient is well-appearing and afebrile on exam.  Labs are stable without leukocytosis.  Previous labs and imaging from [facility] is reviewed to show no evidence for DVT.  X-ray of right tib-fib, performed here in the ED, is negative for acute injury or fracture.  There is evidence for early cellulitis to the right posterior calf.  She is encouraged to continue taking the antibiotics as previously prescribed, Keflex and Zofran.  We will also add Bactrim.  She is encouraged to take the  antibiotics for the next 48 hours and re-evaluate the progress.  If symptoms are persistent and/or worsening, she is encouraged to return here to the emergency department for further evaluation.  She voices understanding and is agreeable with the plan.  She is given specific return precautions.    Amount and/or Complexity of Data Reviewed  Labs: ordered. Decision-making details documented in ED Course.  Radiology: ordered. Decision-making details documented in ED Course.    Risk  OTC drugs.  Prescription drug management.                                      Clinical Impression:  Final diagnoses:  [M79.661] Right calf pain  [L03.115] Cellulitis of right leg (Primary)          ED Disposition Condition    Discharge Stable          ED Prescriptions       Medication Sig Dispense Start Date End Date Auth. Provider    sulfamethoxazole-trimethoprim 800-160mg (BACTRIM DS) 800-160 mg Tab Take 1 tablet by mouth 2 (two) times daily. for 7 days 14 tablet 4/29/2024 5/6/2024 Helena Darby PA-C          Follow-up Information       Follow up With Specialties Details Why Contact Info Additional Information    [facility] -  Emergency Medicine  As needed, If symptoms worsen--re-evaluation on Wednesday afternoon [address] [place] [address] [facility] [phone] 1st floor             Helena Darby PA-C  04/29/24 8179

## 2024-09-20 ENCOUNTER — OCCUPATIONAL HEALTH (OUTPATIENT)
Dept: URGENT CARE | Facility: CLINIC | Age: 44
End: 2024-09-20

## 2024-09-20 DIAGNOSIS — Z02.83 ENCOUNTER FOR DRUG SCREENING: Primary | ICD-10-CM

## 2025-01-08 ENCOUNTER — OCCUPATIONAL HEALTH (OUTPATIENT)
Dept: URGENT CARE | Facility: CLINIC | Age: 45
End: 2025-01-08

## 2025-01-08 DIAGNOSIS — Z02.83 ENCOUNTER FOR DRUG SCREENING: Primary | ICD-10-CM

## 2025-01-08 DIAGNOSIS — Z00.00 ROUTINE GENERAL MEDICAL EXAMINATION AT A HEALTH CARE FACILITY: Primary | ICD-10-CM
